# Patient Record
Sex: FEMALE | Race: WHITE | NOT HISPANIC OR LATINO | Employment: OTHER | URBAN - NONMETROPOLITAN AREA
[De-identification: names, ages, dates, MRNs, and addresses within clinical notes are randomized per-mention and may not be internally consistent; named-entity substitution may affect disease eponyms.]

---

## 2017-01-11 RX ORDER — ERGOCALCIFEROL 1.25 MG/1
CAPSULE ORAL
Qty: 12 CAPSULE | Refills: 0 | Status: SHIPPED | OUTPATIENT
Start: 2017-01-11 | End: 2017-04-06 | Stop reason: SDUPTHER

## 2017-01-26 RX ORDER — METFORMIN HYDROCHLORIDE 500 MG/1
TABLET, EXTENDED RELEASE ORAL
Qty: 360 TABLET | Refills: 0 | Status: SHIPPED | OUTPATIENT
Start: 2017-01-26 | End: 2017-01-31

## 2017-01-27 ENCOUNTER — APPOINTMENT (OUTPATIENT)
Dept: LAB | Facility: HOSPITAL | Age: 62
End: 2017-01-27

## 2017-01-27 PROCEDURE — 36415 COLL VENOUS BLD VENIPUNCTURE: CPT | Performed by: FAMILY MEDICINE

## 2017-01-27 PROCEDURE — 84443 ASSAY THYROID STIM HORMONE: CPT | Performed by: FAMILY MEDICINE

## 2017-01-27 PROCEDURE — 82607 VITAMIN B-12: CPT | Performed by: FAMILY MEDICINE

## 2017-01-27 PROCEDURE — 85027 COMPLETE CBC AUTOMATED: CPT | Performed by: FAMILY MEDICINE

## 2017-01-27 PROCEDURE — 84439 ASSAY OF FREE THYROXINE: CPT | Performed by: FAMILY MEDICINE

## 2017-01-27 PROCEDURE — 80053 COMPREHEN METABOLIC PANEL: CPT | Performed by: FAMILY MEDICINE

## 2017-01-27 PROCEDURE — 82306 VITAMIN D 25 HYDROXY: CPT | Performed by: FAMILY MEDICINE

## 2017-01-27 PROCEDURE — 80061 LIPID PANEL: CPT | Performed by: FAMILY MEDICINE

## 2017-01-27 PROCEDURE — 83036 HEMOGLOBIN GLYCOSYLATED A1C: CPT | Performed by: FAMILY MEDICINE

## 2017-01-31 ENCOUNTER — OFFICE VISIT (OUTPATIENT)
Dept: ENDOCRINOLOGY | Facility: CLINIC | Age: 62
End: 2017-01-31

## 2017-01-31 VITALS
DIASTOLIC BLOOD PRESSURE: 78 MMHG | SYSTOLIC BLOOD PRESSURE: 124 MMHG | BODY MASS INDEX: 28.54 KG/M2 | WEIGHT: 161.1 LBS | HEIGHT: 63 IN | HEART RATE: 61 BPM

## 2017-01-31 DIAGNOSIS — E11.69 MIXED DIABETIC HYPERLIPIDEMIA ASSOCIATED WITH TYPE 2 DIABETES MELLITUS (HCC): ICD-10-CM

## 2017-01-31 DIAGNOSIS — G57.12 MERALGIA PARAESTHETICA, LEFT: ICD-10-CM

## 2017-01-31 DIAGNOSIS — E11.59 HYPERTENSION ASSOCIATED WITH DIABETES (HCC): ICD-10-CM

## 2017-01-31 DIAGNOSIS — I15.2 HYPERTENSION ASSOCIATED WITH DIABETES (HCC): ICD-10-CM

## 2017-01-31 DIAGNOSIS — I43 TYPE 2 DIABETES MELLITUS WITH DIABETIC CARDIOMYOPATHY (HCC): Primary | ICD-10-CM

## 2017-01-31 DIAGNOSIS — E53.8 B12 DEFICIENCY: ICD-10-CM

## 2017-01-31 DIAGNOSIS — E78.2 MIXED DIABETIC HYPERLIPIDEMIA ASSOCIATED WITH TYPE 2 DIABETES MELLITUS (HCC): ICD-10-CM

## 2017-01-31 DIAGNOSIS — E11.69 TYPE 2 DIABETES MELLITUS WITH DIABETIC CARDIOMYOPATHY (HCC): Primary | ICD-10-CM

## 2017-01-31 DIAGNOSIS — I25.10 CORONARY ARTERY DISEASE INVOLVING NATIVE CORONARY ARTERY OF NATIVE HEART, ANGINA PRESENCE UNSPECIFIED: ICD-10-CM

## 2017-01-31 DIAGNOSIS — E55.9 VITAMIN D DEFICIENCY: ICD-10-CM

## 2017-01-31 PROBLEM — G57.10 MERALGIA PARAESTHETICA: Status: ACTIVE | Noted: 2017-01-31

## 2017-01-31 LAB — GLUCOSE BLDC GLUCOMTR-MCNC: 225 MG/DL (ref 70–130)

## 2017-01-31 PROCEDURE — 99214 OFFICE O/P EST MOD 30 MIN: CPT | Performed by: INTERNAL MEDICINE

## 2017-01-31 PROCEDURE — 82962 GLUCOSE BLOOD TEST: CPT | Performed by: INTERNAL MEDICINE

## 2017-01-31 RX ORDER — EZETIMIBE 10 MG/1
10 TABLET ORAL DAILY
Qty: 30 TABLET | Refills: 11 | Status: SHIPPED | OUTPATIENT
Start: 2017-01-31 | End: 2017-12-11 | Stop reason: SDUPTHER

## 2017-01-31 RX ORDER — PRASUGREL 10 MG/1
10 TABLET, FILM COATED ORAL DAILY
COMMUNITY
End: 2020-05-14 | Stop reason: SDUPTHER

## 2017-01-31 RX ORDER — GABAPENTIN 300 MG/1
300 CAPSULE ORAL 3 TIMES DAILY
Qty: 90 CAPSULE | Refills: 11 | Status: SHIPPED | OUTPATIENT
Start: 2017-01-31 | End: 2017-02-20

## 2017-01-31 NOTE — PROGRESS NOTES
Bonnie Garcia is a 61 y.o. female who presents for  evaluation of No chief complaint on file.        Primary Care / Referring Provider  Yudi Quiñones MD      Duration/Timing:  Diabetes mellitus type 2, Age at onset of diabetes: 58 years, Onset of symptoms gradual  Timing - constant  Quality - controlled  Severity - high since associated CAD    alleviating factors - degree of understanding/education    Severity (Complications/Hospitalizations)  Secondary Macrovascular Complications:  CAD, No CVA, No PAD  has 50 to 50% LAD     PTCI , stent x 5    2 in proximal LAD    1 in circumflex    may need heart surgery   Secondary Microvascular Complications:  No Diabetic Nephropathy, No Diabetic Retinopathy, No Diabetic Neuropathy    Context  Diabetes Regimen:  Oral Medications  Blood Glucose Readings  at goal  Diet  variable carb intake  Exercise:  Does not exercise    Associated Signs/Symptoms  Hyperglycemic Symptoms:  No polyuria, No polydipsia, No polyphagia, No blurred vision, Weight gain, Weight loss/gain amount 12 lbs lbs since last appointment  Hypoglycemic Episodes:  No documented hypoglycemia  Also h o thyroid nodule  location - right  timing constant  not progressive  severity - low -  bx , Sept 2008 - benign   biochemically euthyroid , off synthroid but used to be on 25 mcgs daily     thyroid US personally reviewed    Sept 2008    1.8 x 1.7 x 1.4 , right , solid , heterogeneous, hypoechoic.     Repeat US June 2013    r solid nodule stable in size, echogenicity consistent w Hashimoto's       Past Medical History   Diagnosis Date   • Chicken pox    • Coronary arteriosclerosis    • Diabetes mellitus      TYPE 2   • Diastasis of muscle    • GERD (gastroesophageal reflux disease)    • H/O Hashimoto thyroiditis    • History of echocardiogram 01/01/2007     ef 35-40% diastolic dysfunction   • History of mammogram 04/13/2015   • History of myocardial infarction 2006   • History of Papanicolaou smear of cervix  2004     negative   • Hyperlipidemia    • Hypertension    • Measles    • Mild concentric left ventricular hypertrophy (LVH)    • Thyroid nodule    • Vitamin D deficiency      Family History   Problem Relation Age of Onset   • Diabetes Mother    • Coronary artery disease Mother    • Heart attack Father       at 53 multiple mi's   • Coronary artery disease Other    • Diabetes Other    • Thyroid disease Other    • Hypothyroidism Daughter      Social History   Substance Use Topics   • Smoking status: Former Smoker     Types: Cigarettes   • Smokeless tobacco: Never Used      Comment: smoked for 5 years   • Alcohol use Yes      Comment: SOCIAL         Current Outpatient Prescriptions:   •  aspirin 325 MG tablet, Take 325 mg by mouth Daily., Disp: , Rfl:   •  Canagliflozin 100 MG tablet, Take 1 dose by mouth Daily., Disp: 30 tablet, Rfl: 2  •  carvedilol CR (COREG CR) 20 MG 24 hr capsule, Take 20 mg by mouth Daily., Disp: , Rfl:   •  glucose blood test strip, 1 each by Other route As Needed. Use as instructed, Disp: , Rfl:   •  icosapent ethyl (VASCEPA) 1 G capsule capsule, Take 2 g by mouth 2 (Two) Times a Day With Meals., Disp: , Rfl:   •  metFORMIN ER (GLUCOPHAGE-XR) 500 MG 24 hr tablet, TAKE 2 TABLETS BY MOUTH TWICE DAILY, Disp: 360 tablet, Rfl: 0  •  prasugrel (EFFIENT) 10 MG tablet, Take 10 mg by mouth Daily., Disp: , Rfl:   •  ranolazine (RANEXA) 500 MG 12 hr tablet, Take 500 mg by mouth 2 (Two) Times a Day., Disp: , Rfl:   •  rosuvastatin (CRESTOR) 5 MG tablet, Take 5 mg by mouth Daily., Disp: , Rfl:   •  vitamin D (ERGOCALCIFEROL) 00493 UNITS capsule capsule, TAKE 1 CAPSULE BY MOUTH ONCE A WEEK, Disp: 12 capsule, Rfl: 0    Review of Systems    Review of Systems   Constitutional: Negative for activity change, appetite change, chills, diaphoresis, fatigue, fever and unexpected weight change.   HENT: Negative for congestion, dental problem, drooling, ear discharge, ear pain, facial swelling, mouth sores,  "postnasal drip, rhinorrhea, sinus pressure, sore throat, tinnitus, trouble swallowing and voice change.    Eyes: Negative for photophobia, pain, discharge, redness, itching and visual disturbance.   Respiratory: Negative for apnea, cough, choking, chest tightness, shortness of breath, wheezing and stridor.    Cardiovascular: Negative for chest pain, palpitations and leg swelling.   Gastrointestinal: Negative for abdominal distention, abdominal pain, constipation, diarrhea, nausea and vomiting.   Endocrine: Negative for cold intolerance, heat intolerance, polydipsia, polyphagia and polyuria.   Genitourinary: Negative for decreased urine volume, difficulty urinating, dysuria, flank pain, frequency, hematuria and urgency.   Musculoskeletal: Positive for myalgias. Negative for arthralgias, back pain, gait problem, joint swelling, neck pain and neck stiffness.   Skin: Negative for color change, pallor, rash and wound.   Allergic/Immunologic: Negative for immunocompromised state.   Neurological: Negative for dizziness, tremors, seizures, syncope, facial asymmetry, speech difficulty, weakness, light-headedness, numbness and headaches.   Hematological: Negative for adenopathy.   Psychiatric/Behavioral: Negative for agitation, behavioral problems, confusion, decreased concentration, dysphoric mood, hallucinations, self-injury, sleep disturbance and suicidal ideas. The patient is not nervous/anxious and is not hyperactive.         Objective:     Visit Vitals   • /78 (BP Location: Left arm, Patient Position: Sitting, Cuff Size: Large Adult)   • Pulse 61   • Ht 63\" (160 cm)   • Wt 161 lb 1.6 oz (73.1 kg)   • BMI 28.54 kg/m2       Physical Exam   Constitutional: She is oriented to person, place, and time. She appears well-developed.   HENT:   Head: Normocephalic.   Right Ear: External ear normal.   Left Ear: External ear normal.   Nose: Nose normal.   Eyes: Conjunctivae and EOM are normal. No scleral icterus.   Neck: " Normal range of motion. Neck supple. No tracheal deviation present. No thyromegaly present.   Cardiovascular: Normal rate, regular rhythm, normal heart sounds and intact distal pulses.  Exam reveals no gallop and no friction rub.    No murmur heard.  Pulmonary/Chest: Effort normal and breath sounds normal. No stridor. No respiratory distress. She has no wheezes. She has no rales. She exhibits no tenderness.   Abdominal: Soft. Bowel sounds are normal. She exhibits no distension and no mass. There is no tenderness. There is no rebound and no guarding.   Musculoskeletal: Normal range of motion. She exhibits no tenderness or deformity.   Lymphadenopathy:     She has no cervical adenopathy.   Neurological: She is alert and oriented to person, place, and time. She displays normal reflexes. She exhibits normal muscle tone. Coordination normal.   Skin: No rash noted. No erythema. No pallor.   Psychiatric: She has a normal mood and affect. Her behavior is normal. Judgment and thought content normal.       Lab Review    Results for orders placed or performed in visit on 01/31/17   POC Glucose Fingerstick   Result Value Ref Range    Glucose 225 (A) 70 - 130 mg/dL           Assessment/Plan       ICD-10-CM ICD-9-CM   1. Type 2 diabetes mellitus with diabetic nephropathy, without long-term current use of insulin E11.21 250.40     583.81         Glycemic Management:      Lab Results   Component Value Date    HGBA1C 7.88 (H) 01/27/2017    HGBA1C 7.0 (H) 06/27/2016    HGBA1C 5.8 (H) 01/04/2016     Lab Results   Component Value Date    LDLCALC 102 06/27/2016    CREATININE 0.49 (L) 01/27/2017         Diabetes ----        on metformin  mg tablets, 1 gram po bid    invokana 100 mg daily     Change to synjardy 12.5/1000 , 1 tab po bid since has CAD and based on EMPAREG data        never needed glimepiride    afraid of januvia , victoza     off glyburide    concern about pancreatic cancer      Lipid Management  Lab Results    Component Value Date    CHOL 204 (H) 01/27/2017     Lab Results   Component Value Date    TRIG 281 (H) 01/27/2017    TRIG 288 (H) 06/27/2016    TRIG 114 10/16/2015     Lab Results   Component Value Date    HDL 43 (L) 01/27/2017    HDL 36 (L) 06/27/2016    HDL 45 (L) 10/16/2015     Lab Results   Component Value Date    LDLCALC 102 06/27/2016    LDLCALC 108 10/16/2015    LDLCALC 60 03/30/2015     LDL elevated        can't handle tricor    on vascepa ( intolerant to lovaza )    niaspan 500 mg ER  taking - continue    tolerating crestor 5 mg but not ever day     has tried lipitor, zocor, livalo, fluvastatin since 2010 but hasn't been able to tolerate     start repatha or praluent not paid for    Try zetia 10 mg daily         Blood Pressure Management:  Target blood pressure less than 130/80 mm/hg, Not on Ace Inhibitor/ARB, Control of blood pressure  on coreg cr 20 mg daily and  losartan 25 mg   was on benicar ,  off ACEi due to cough    edema left > r , use compression stockings  Microvascular Complication Monitoring:  No Microalbuminuria, Date of last Microalbumin Assessment 10/08/2015, No Diabetic Retinopathy, No Diabetic Neuropathy  last eye exam 2015 no DR    +  neuropathy   Meralgia paresthetica  Start neurontin 300 mg tid   no nephropathy    mild microalbuminuria  Immunizations:  Last influenza immunization 2015, Patient prefers not to receive pneumococcal immunization  Preventive Care:  Patient is not smoking  Weight Related  has gained 12 lbs but will lose it   Bone Health  Vit D 50 th weekly and levels are nl   Other Diabetes Related Aspects  Right Thyroid nodule      R nodule , dimensions in HPI , stable from 2008 to 2013     she has Hashimoto's proven on US and + TPO ab, euthyroid June 2013  used to be on Lt4 at 25 mcgs but d/c years ago  follow TSH - nl     Lab Results   Component Value Date    TSH 2.020 01/27/2017         --    10-15  nl B12 but low normal, start b12     having some unsteadiness and  numbness      I reviewed and summarized records from Yudi Quiñones MD from 2017 and I reviewed / ordered labs.     Orders Placed This Encounter   Procedures   • POC Glucose Fingerstick         A copy of my note was sent to Yudi Quiñones MD    Please see my above opinion and suggestions.

## 2017-01-31 NOTE — MR AVS SNAPSHOT
Bonnie Garcia   1/31/2017 11:30 AM   Office Visit    Dept Phone:  [phone]   Encounter #:  [id number]    Provider:  Victorino Leyva MD   Department:  [facility] ENDOCRINOLOGY                Your Full Care Plan              Today's Medication Changes          These changes are accurate as of: 1/31/17 12:32 PM.  If you have any questions, ask your nurse or doctor.               New Medication(s)Ordered:     Empagliflozin-Metformin HCl 12.5-1000 MG tablet   Commonly known as:  SYNJARDY   Take 1 tablet by mouth 2 (Two) Times a Day With Meals.       ezetimibe 10 MG tablet   Commonly known as:  ZETIA   Take 1 tablet by mouth Daily.       gabapentin 300 MG capsule   Commonly known as:  NEURONTIN   Take 1 capsule by mouth 3 (Three) Times a Day.         Stop taking medication(s)listed here:     Canagliflozin 100 MG tablet           metFORMIN  MG 24 hr tablet   Commonly known as:  GLUCOPHAGE-XR                Where to Get Your Medications      These medications were sent to [facility] [address] [place] [address] AT [facility] [address] [place] - [phone] [facility] [phone] [address] [address]     Phone:  [phone]     Empagliflozin-Metformin HCl 12.5-1000 MG tablet    ezetimibe 10 MG tablet    gabapentin 300 MG capsule                  Your Updated Medication List          This list is accurate as of: 1/31/17 12:32 PM.  Always use your most recent med list.                aspirin 325 MG tablet       carvedilol CR 20 MG 24 hr capsule   Commonly known as:  COREG CR       Empagliflozin-Metformin HCl 12.5-1000 MG tablet   Commonly known as:  SYNJARDY   Take 1 tablet by mouth 2 (Two) Times a Day With Meals.       ezetimibe 10 MG tablet   Commonly known as:  ZETIA   Take 1 tablet by mouth Daily.       gabapentin 300 MG capsule   Commonly known as:  NEURONTIN   Take 1 capsule by mouth 3 (Three) Times a Day.       glucose  blood test strip       prasugrel 10 MG tablet   Commonly known as:  EFFIENT       ranolazine 500 MG 12 hr tablet   Commonly known as:  RANEXA       rosuvastatin 5 MG tablet   Commonly known as:  CRESTOR       VASCEPA 1 G capsule capsule   Generic drug:  icosapent ethyl       vitamin D 80252 UNITS capsule capsule   Commonly known as:  ERGOCALCIFEROL   TAKE 1 CAPSULE BY MOUTH ONCE A WEEK               We Performed the Following     POC Glucose Fingerstick       You Were Diagnosed With        Codes Comments    Type 2 diabetes mellitus with diabetic cardiomyopathy    -  Primary ICD-10-CM: E11.69, I43  ICD-9-CM: 250.80, 425.7     Coronary artery disease involving native coronary artery of native heart, angina presence unspecified     ICD-10-CM: I25.10  ICD-9-CM: 414.01     Hypertension associated with diabetes     ICD-10-CM: E11.59, I10  ICD-9-CM: 250.80, 401.9     Mixed diabetic hyperlipidemia associated with type 2 diabetes mellitus     ICD-10-CM: E11.69, E78.2  ICD-9-CM: 250.80, 272.2     Vitamin D deficiency     ICD-10-CM: E55.9  ICD-9-CM: 268.9     B12 deficiency     ICD-10-CM: E53.8  ICD-9-CM: 266.2     Meralgia paraesthetica, left     ICD-10-CM: G57.12  ICD-9-CM: 355.1       Instructions     None    Patient Instructions History      Upcoming Appointments     Visit Type Date Time Department    FOLLOW UP 1/31/2017 11:30 AM [facility]CR[facility]OLOGY [facility]    FOLLOW UP 6/5/2017 10:45 AM [facility]CR[facility]OLOGY [facility]      MyChart Signup     Our records indicate that you have declined [facility] signup. If you would like to sign up for [facility], please email [email or web address] or call [phone] to obtain an activation code.             Other Info from Your Visit           Your Appointments     Jun 05, 2017 10:45 AM CDT   Follow Up with Victorino Leyva MD   [facility] (--)    [address]  [facility] [address] [place] [id number]   [phone]           Arrive 15  "minutes prior to appointment.              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

## 2017-02-20 RX ORDER — GABAPENTIN 100 MG/1
CAPSULE ORAL
Qty: 180 CAPSULE | Refills: 11 | Status: SHIPPED | OUTPATIENT
Start: 2017-02-20 | End: 2018-01-26 | Stop reason: SINTOL

## 2017-03-08 RX ORDER — ROSUVASTATIN CALCIUM 5 MG/1
5 TABLET, COATED ORAL DAILY
Qty: 30 TABLET | Refills: 5 | Status: SHIPPED | OUTPATIENT
Start: 2017-03-08 | End: 2017-07-27 | Stop reason: SDUPTHER

## 2017-04-06 RX ORDER — ERGOCALCIFEROL 1.25 MG/1
CAPSULE ORAL
Qty: 12 CAPSULE | Refills: 0 | Status: SHIPPED | OUTPATIENT
Start: 2017-04-06 | End: 2017-06-16 | Stop reason: SDUPTHER

## 2017-05-31 ENCOUNTER — LAB (OUTPATIENT)
Dept: LAB | Facility: HOSPITAL | Age: 62
End: 2017-05-31

## 2017-05-31 DIAGNOSIS — I25.10 CORONARY ARTERY DISEASE INVOLVING NATIVE CORONARY ARTERY OF NATIVE HEART, ANGINA PRESENCE UNSPECIFIED: ICD-10-CM

## 2017-05-31 DIAGNOSIS — I43 TYPE 2 DIABETES MELLITUS WITH DIABETIC CARDIOMYOPATHY (HCC): ICD-10-CM

## 2017-05-31 DIAGNOSIS — E11.69 TYPE 2 DIABETES MELLITUS WITH DIABETIC CARDIOMYOPATHY (HCC): ICD-10-CM

## 2017-05-31 DIAGNOSIS — E53.8 B12 DEFICIENCY: ICD-10-CM

## 2017-05-31 DIAGNOSIS — E78.2 MIXED DIABETIC HYPERLIPIDEMIA ASSOCIATED WITH TYPE 2 DIABETES MELLITUS (HCC): ICD-10-CM

## 2017-05-31 DIAGNOSIS — E11.59 HYPERTENSION ASSOCIATED WITH DIABETES (HCC): ICD-10-CM

## 2017-05-31 DIAGNOSIS — I15.2 HYPERTENSION ASSOCIATED WITH DIABETES (HCC): ICD-10-CM

## 2017-05-31 DIAGNOSIS — E55.9 VITAMIN D DEFICIENCY: ICD-10-CM

## 2017-05-31 DIAGNOSIS — E11.69 MIXED DIABETIC HYPERLIPIDEMIA ASSOCIATED WITH TYPE 2 DIABETES MELLITUS (HCC): ICD-10-CM

## 2017-05-31 LAB
25(OH)D3 SERPL-MCNC: 50 NG/ML (ref 30–100)
ALBUMIN SERPL-MCNC: 4.3 G/DL (ref 3.4–4.8)
ALBUMIN UR-MCNC: 2.4 MG/L
ALBUMIN/GLOB SERPL: 1.7 G/DL (ref 1.1–1.8)
ALP SERPL-CCNC: 91 U/L (ref 38–126)
ALT SERPL W P-5'-P-CCNC: 46 U/L (ref 9–52)
ANION GAP SERPL CALCULATED.3IONS-SCNC: 13 MMOL/L (ref 5–15)
ARTICHOKE IGE QN: 66 MG/DL (ref 1–129)
AST SERPL-CCNC: 85 U/L (ref 14–36)
BASOPHILS # BLD AUTO: 0.02 10*3/MM3 (ref 0–0.2)
BASOPHILS NFR BLD AUTO: 0.4 % (ref 0–2)
BILIRUB SERPL-MCNC: 0.9 MG/DL (ref 0.2–1.3)
BUN BLD-MCNC: 14 MG/DL (ref 7–21)
BUN/CREAT SERPL: 27.5 (ref 7–25)
CALCIUM SPEC-SCNC: 9 MG/DL (ref 8.4–10.2)
CHLORIDE SERPL-SCNC: 101 MMOL/L (ref 95–110)
CHOLEST SERPL-MCNC: 135 MG/DL (ref 0–199)
CO2 SERPL-SCNC: 27 MMOL/L (ref 22–31)
CREAT BLD-MCNC: 0.51 MG/DL (ref 0.5–1)
CREAT UR-MCNC: 45.6 MG/DL
DEPRECATED RDW RBC AUTO: 45 FL (ref 36.4–46.3)
EOSINOPHIL # BLD AUTO: 0.17 10*3/MM3 (ref 0–0.7)
EOSINOPHIL NFR BLD AUTO: 3.2 % (ref 0–7)
ERYTHROCYTE [DISTWIDTH] IN BLOOD BY AUTOMATED COUNT: 13.8 % (ref 11.5–14.5)
GFR SERPL CREATININE-BSD FRML MDRD: 123 ML/MIN/1.73 (ref 45–104)
GLOBULIN UR ELPH-MCNC: 2.6 GM/DL (ref 2.3–3.5)
GLUCOSE BLD-MCNC: 141 MG/DL (ref 60–100)
HBA1C MFR BLD: 7.16 % (ref 4–5.6)
HCT VFR BLD AUTO: 41.9 % (ref 35–45)
HDLC SERPL-MCNC: 41 MG/DL (ref 60–200)
HGB BLD-MCNC: 14 G/DL (ref 12–15.5)
IMM GRANULOCYTES # BLD: 0.1 10*3/MM3 (ref 0–0.02)
IMM GRANULOCYTES NFR BLD: 1.9 % (ref 0–0.5)
LDLC/HDLC SERPL: 1.15 {RATIO} (ref 0–3.22)
LYMPHOCYTES # BLD AUTO: 1.51 10*3/MM3 (ref 0.6–4.2)
LYMPHOCYTES NFR BLD AUTO: 28 % (ref 10–50)
MAGNESIUM SERPL-MCNC: 2 MG/DL (ref 1.6–2.3)
MCH RBC QN AUTO: 29.9 PG (ref 26.5–34)
MCHC RBC AUTO-ENTMCNC: 33.4 G/DL (ref 31.4–36)
MCV RBC AUTO: 89.3 FL (ref 80–98)
MICROALBUMIN/CREAT UR: 52.6 MG/G (ref 0–30)
MONOCYTES # BLD AUTO: 0.42 10*3/MM3 (ref 0–0.9)
MONOCYTES NFR BLD AUTO: 7.8 % (ref 0–12)
NEUTROPHILS # BLD AUTO: 3.17 10*3/MM3 (ref 2–8.6)
NEUTROPHILS NFR BLD AUTO: 58.7 % (ref 37–80)
PLATELET # BLD AUTO: 229 10*3/MM3 (ref 150–450)
PMV BLD AUTO: 11.3 FL (ref 8–12)
POTASSIUM BLD-SCNC: 4.5 MMOL/L (ref 3.5–5.1)
PROT SERPL-MCNC: 6.9 G/DL (ref 6.3–8.6)
RBC # BLD AUTO: 4.69 10*6/MM3 (ref 3.77–5.16)
SODIUM BLD-SCNC: 141 MMOL/L (ref 137–145)
TRIGL SERPL-MCNC: 235 MG/DL (ref 20–199)
TSH SERPL DL<=0.05 MIU/L-ACNC: 1.54 MIU/ML (ref 0.46–4.68)
VIT B12 BLD-MCNC: 332 PG/ML (ref 239–931)
WBC NRBC COR # BLD: 5.39 10*3/MM3 (ref 3.2–9.8)

## 2017-05-31 PROCEDURE — 82306 VITAMIN D 25 HYDROXY: CPT | Performed by: INTERNAL MEDICINE

## 2017-05-31 PROCEDURE — 36415 COLL VENOUS BLD VENIPUNCTURE: CPT

## 2017-05-31 PROCEDURE — 83036 HEMOGLOBIN GLYCOSYLATED A1C: CPT | Performed by: INTERNAL MEDICINE

## 2017-05-31 PROCEDURE — 83735 ASSAY OF MAGNESIUM: CPT | Performed by: INTERNAL MEDICINE

## 2017-05-31 PROCEDURE — 82607 VITAMIN B-12: CPT | Performed by: INTERNAL MEDICINE

## 2017-05-31 PROCEDURE — 80053 COMPREHEN METABOLIC PANEL: CPT | Performed by: INTERNAL MEDICINE

## 2017-05-31 PROCEDURE — 85025 COMPLETE CBC W/AUTO DIFF WBC: CPT | Performed by: INTERNAL MEDICINE

## 2017-05-31 PROCEDURE — 82570 ASSAY OF URINE CREATININE: CPT | Performed by: INTERNAL MEDICINE

## 2017-05-31 PROCEDURE — 80061 LIPID PANEL: CPT | Performed by: INTERNAL MEDICINE

## 2017-05-31 PROCEDURE — 84443 ASSAY THYROID STIM HORMONE: CPT | Performed by: INTERNAL MEDICINE

## 2017-05-31 PROCEDURE — 82043 UR ALBUMIN QUANTITATIVE: CPT | Performed by: INTERNAL MEDICINE

## 2017-06-05 ENCOUNTER — OFFICE VISIT (OUTPATIENT)
Dept: ENDOCRINOLOGY | Facility: CLINIC | Age: 62
End: 2017-06-05

## 2017-06-05 VITALS
WEIGHT: 161.4 LBS | HEIGHT: 63 IN | SYSTOLIC BLOOD PRESSURE: 126 MMHG | DIASTOLIC BLOOD PRESSURE: 78 MMHG | HEART RATE: 100 BPM | BODY MASS INDEX: 28.6 KG/M2

## 2017-06-05 DIAGNOSIS — I15.2 HYPERTENSION ASSOCIATED WITH DIABETES (HCC): ICD-10-CM

## 2017-06-05 DIAGNOSIS — E11.69 TYPE 2 DIABETES MELLITUS WITH DIABETIC CARDIOMYOPATHY (HCC): ICD-10-CM

## 2017-06-05 DIAGNOSIS — E55.9 VITAMIN D DEFICIENCY: ICD-10-CM

## 2017-06-05 DIAGNOSIS — E11.59 HYPERTENSION ASSOCIATED WITH DIABETES (HCC): ICD-10-CM

## 2017-06-05 DIAGNOSIS — E11.69 MIXED DIABETIC HYPERLIPIDEMIA ASSOCIATED WITH TYPE 2 DIABETES MELLITUS (HCC): Primary | ICD-10-CM

## 2017-06-05 DIAGNOSIS — E53.8 B12 DEFICIENCY: ICD-10-CM

## 2017-06-05 DIAGNOSIS — I43 TYPE 2 DIABETES MELLITUS WITH DIABETIC CARDIOMYOPATHY (HCC): ICD-10-CM

## 2017-06-05 DIAGNOSIS — E78.2 MIXED DIABETIC HYPERLIPIDEMIA ASSOCIATED WITH TYPE 2 DIABETES MELLITUS (HCC): Primary | ICD-10-CM

## 2017-06-05 LAB — GLUCOSE BLDC GLUCOMTR-MCNC: 183 MG/DL (ref 70–130)

## 2017-06-05 PROCEDURE — 82962 GLUCOSE BLOOD TEST: CPT | Performed by: INTERNAL MEDICINE

## 2017-06-05 PROCEDURE — 99214 OFFICE O/P EST MOD 30 MIN: CPT | Performed by: INTERNAL MEDICINE

## 2017-06-05 NOTE — PROGRESS NOTES
Bonnie Garcia is a 62 y.o. female who presents for  evaluation of   Chief Complaint   Patient presents with   • Diabetes         Primary Care / Referring Provider  Yudi Quiñones MD      Duration/Timing:  Diabetes mellitus type 2, Age at onset of diabetes: 58 years, Onset of symptoms gradual  Timing - constant  Quality - controlled  Severity - high since associated CAD    alleviating factors - degree of understanding/education    Severity (Complications/Hospitalizations)  Secondary Macrovascular Complications:  CAD, No CVA, No PAD  has 50 to 50% LAD     PTCI , stent x 5    2 in proximal LAD    1 in circumflex    may need heart surgery   Secondary Microvascular Complications:  No Diabetic Nephropathy, No Diabetic Retinopathy, No Diabetic Neuropathy    Context  Diabetes Regimen:  Oral Medications  Blood Glucose Readings  at goal  Diet  variable carb intake  Exercise:  Does not exercise    Associated Signs/Symptoms  Hyperglycemic Symptoms:  No polyuria, No polydipsia, No polyphagia, No blurred vision, Weight gain, Weight loss/gain amount 12 lbs lbs since last appointment  Hypoglycemic Episodes:  No documented hypoglycemia  Also h o thyroid nodule  location - right  timing constant  not progressive  severity - low -  bx , Sept 2008 - benign   biochemically euthyroid , off synthroid but used to be on 25 mcgs daily     thyroid US personally reviewed    Sept 2008    1.8 x 1.7 x 1.4 , right , solid , heterogeneous, hypoechoic.     Repeat US June 2013    r solid nodule stable in size, echogenicity consistent w Hashimoto's       Past Medical History:   Diagnosis Date   • Chicken pox    • Coronary arteriosclerosis    • Diabetes mellitus     TYPE 2   • Diastasis of muscle    • GERD (gastroesophageal reflux disease)    • H/O Hashimoto thyroiditis    • History of echocardiogram 01/01/2007    ef 35-40% diastolic dysfunction   • History of mammogram 04/13/2015   • History of myocardial infarction 2006   • History of  Papanicolaou smear of cervix 2004    negative   • Hyperlipidemia    • Hypertension    • Measles    • Mild concentric left ventricular hypertrophy (LVH)    • Thyroid nodule    • Vitamin D deficiency      Family History   Problem Relation Age of Onset   • Diabetes Mother    • Coronary artery disease Mother    • Heart attack Father       at 53 multiple mi's   • Coronary artery disease Other    • Diabetes Other    • Thyroid disease Other    • Hypothyroidism Daughter      Social History   Substance Use Topics   • Smoking status: Former Smoker     Types: Cigarettes   • Smokeless tobacco: Never Used      Comment: smoked for 5 years   • Alcohol use Yes      Comment: SOCIAL         Current Outpatient Prescriptions:   •  aspirin 325 MG tablet, Take 325 mg by mouth Daily., Disp: , Rfl:   •  carvedilol CR (COREG CR) 20 MG 24 hr capsule, Take 20 mg by mouth Daily., Disp: , Rfl:   •  Empagliflozin-Metformin HCl (SYNJARDY) 12.5-1000 MG tablet, Take 1 tablet by mouth 2 (Two) Times a Day With Meals., Disp: 60 tablet, Rfl: 11  •  ezetimibe (ZETIA) 10 MG tablet, Take 1 tablet by mouth Daily., Disp: 30 tablet, Rfl: 11  •  gabapentin (NEURONTIN) 100 MG capsule, Take up to 200 mg tid, Disp: 180 capsule, Rfl: 11  •  glucose blood test strip, 1 each by Other route As Needed. Use as instructed, Disp: , Rfl:   •  icosapent ethyl (VASCEPA) 1 G capsule capsule, Take 2 g by mouth 2 (Two) Times a Day With Meals., Disp: , Rfl:   •  prasugrel (EFFIENT) 10 MG tablet, Take 10 mg by mouth Daily., Disp: , Rfl:   •  ranolazine (RANEXA) 500 MG 12 hr tablet, Take 500 mg by mouth 2 (Two) Times a Day., Disp: , Rfl:   •  rosuvastatin (CRESTOR) 5 MG tablet, Take 1 tablet by mouth Daily., Disp: 30 tablet, Rfl: 5  •  vitamin D (ERGOCALCIFEROL) 74970 UNITS capsule capsule, TAKE 1 CAPSULE BY MOUTH ONCE A WEEK, Disp: 12 capsule, Rfl: 0    Review of Systems    Review of Systems   Constitutional: Negative for activity change, appetite change, chills,  "diaphoresis, fatigue, fever and unexpected weight change.   HENT: Negative for congestion, dental problem, drooling, ear discharge, ear pain, facial swelling, mouth sores, postnasal drip, rhinorrhea, sinus pressure, sore throat, tinnitus, trouble swallowing and voice change.    Eyes: Negative for photophobia, pain, discharge, redness, itching and visual disturbance.   Respiratory: Negative for apnea, cough, choking, chest tightness, shortness of breath, wheezing and stridor.    Cardiovascular: Negative for chest pain, palpitations and leg swelling.   Gastrointestinal: Negative for abdominal distention, abdominal pain, constipation, diarrhea, nausea and vomiting.   Endocrine: Negative for cold intolerance, heat intolerance, polydipsia, polyphagia and polyuria.   Genitourinary: Negative for decreased urine volume, difficulty urinating, dysuria, flank pain, frequency, hematuria and urgency.   Musculoskeletal: Positive for myalgias. Negative for arthralgias, back pain, gait problem, joint swelling, neck pain and neck stiffness.   Skin: Negative for color change, pallor, rash and wound.   Allergic/Immunologic: Negative for immunocompromised state.   Neurological: Negative for dizziness, tremors, seizures, syncope, facial asymmetry, speech difficulty, weakness, light-headedness, numbness and headaches.   Hematological: Negative for adenopathy.   Psychiatric/Behavioral: Negative for agitation, behavioral problems, confusion, decreased concentration, dysphoric mood, hallucinations, self-injury, sleep disturbance and suicidal ideas. The patient is not nervous/anxious and is not hyperactive.         Objective:     /78 (BP Location: Right arm, Patient Position: Sitting, Cuff Size: Adult)  Pulse 100  Ht 63\" (160 cm)  Wt 161 lb 6.4 oz (73.2 kg)  BMI 28.59 kg/m2    Physical Exam   Constitutional: She is oriented to person, place, and time. She appears well-developed.   HENT:   Head: Normocephalic.   Right Ear: External " ear normal.   Left Ear: External ear normal.   Nose: Nose normal.   Eyes: Conjunctivae and EOM are normal. No scleral icterus.   Neck: Normal range of motion. Neck supple. No tracheal deviation present. No thyromegaly present.   Cardiovascular: Normal rate, regular rhythm, normal heart sounds and intact distal pulses.  Exam reveals no gallop and no friction rub.    No murmur heard.  Pulmonary/Chest: Effort normal and breath sounds normal. No stridor. No respiratory distress. She has no wheezes. She has no rales. She exhibits no tenderness.   Abdominal: Soft. Bowel sounds are normal. She exhibits no distension and no mass. There is no tenderness. There is no rebound and no guarding.   Musculoskeletal: Normal range of motion. She exhibits no tenderness or deformity.   Lymphadenopathy:     She has no cervical adenopathy.   Neurological: She is alert and oriented to person, place, and time. She displays normal reflexes. She exhibits normal muscle tone. Coordination normal.   Skin: No rash noted. No erythema. No pallor.   Psychiatric: She has a normal mood and affect. Her behavior is normal. Judgment and thought content normal.       Lab Review    Results for orders placed or performed in visit on 06/05/17   POC Glucose Fingerstick   Result Value Ref Range    Glucose 183 (A) 70 - 130 mg/dL           Assessment/Plan       ICD-10-CM ICD-9-CM   1. Mixed diabetic hyperlipidemia associated with type 2 diabetes mellitus E11.69 250.80    E78.2 272.2         Glycemic Management:      Lab Results   Component Value Date    HGBA1C 7.16 (H) 05/31/2017    HGBA1C 7.88 (H) 01/27/2017    HGBA1C 7.0 (H) 06/27/2016     Lab Results   Component Value Date    MICROALBUR 2.4 05/31/2017    LDLCALC 102 06/27/2016    CREATININE 0.51 05/31/2017         Diabetes ----        on metformin  mg tablets, 1 gram po bid    invokana 100 mg daily     Change to synjardy 12.5/1000 , 1 tab po bid since has CAD and based on EMPAREG data        never  needed glimepiride    afraid of januvia , victoza , will try januvia     off glyburide    concern about pancreatic cancer      Lipid Management  Lab Results   Component Value Date    CHOL 135 05/31/2017    CHOL 204 (H) 01/27/2017     Lab Results   Component Value Date    TRIG 235 (H) 05/31/2017    TRIG 281 (H) 01/27/2017    TRIG 288 (H) 06/27/2016     Lab Results   Component Value Date    HDL 41 (L) 05/31/2017    HDL 43 (L) 01/27/2017    HDL 36 (L) 06/27/2016     Lab Results   Component Value Date    LDLCALC 102 06/27/2016    LDLCALC 108 10/16/2015    LDLCALC 60 03/30/2015     Lab Results   Component Value Date    LDLDIRECT 66 05/31/2017    LDLDIRECT 116 01/27/2017         LDL elevated        can't handle tricor    on vascepa ( intolerant to lovaza )    niaspan 500 mg ER  taking - continue    tolerating crestor 5 mg but not ever day , this is a much as she can tolerate without developing myaligas    has tried lipitor, zocor, livalo, fluvastatin since 2010 but hasn't been able to tolerate due to myalgias     Try zetia 10 mg daily  And working     Try co q10 , my idea     Blood Pressure Management:  Target blood pressure less than 130/80 mm/hg, Not on Ace Inhibitor/ARB, Control of blood pressure  on coreg cr 20 mg daily and  losartan 25 mg   was on benicar ,  off ACEi due to cough    edema left > r , use compression stockings  Microvascular Complication Monitoring:  No Microalbuminuria, Date of last Microalbumin Assessment 10/08/2015, No Diabetic Retinopathy, No Diabetic Neuropathy  last eye exam 2015 no DR    +  neuropathy   Meralgia paresthetica  Start neurontin 300 mg tid had to back off to 100 mg po bid   no nephropathy    mild microalbuminuria  Immunizations:  Last influenza immunization 2015, Patient prefers not to receive pneumococcal immunization  Preventive Care:  Patient is not smoking  Weight Related  has gained 12 lbs but will lose it     Add contrave to lifestyle changes  Bone Health  Vit D 50 th weekly  and levels are nl   Other Diabetes Related Aspects  Right Thyroid nodule      R nodule , dimensions in HPI , stable from 2008 to 2013     she has Hashimoto's proven on US and + TPO ab, euthyroid June 2013  used to be on Lt4 at 25 mcgs but d/c years ago  follow TSH - nl     Lab Results   Component Value Date    TSH 1.540 05/31/2017         --    10-15  nl B12 but low normal, start b12     having some unsteadiness and numbness      I reviewed and summarized records from Yudi Quiñones MD from 2017 and I reviewed / ordered labs.     Orders Placed This Encounter   Procedures   • POC Glucose Fingerstick         A copy of my note was sent to Yudi Quiñones MD    Please see my above opinion and suggestions.

## 2017-06-06 ENCOUNTER — TELEPHONE (OUTPATIENT)
Dept: ENDOCRINOLOGY | Facility: CLINIC | Age: 62
End: 2017-06-06

## 2017-06-16 RX ORDER — ERGOCALCIFEROL 1.25 MG/1
CAPSULE ORAL
Qty: 12 CAPSULE | Refills: 0 | Status: SHIPPED | OUTPATIENT
Start: 2017-06-16 | End: 2017-09-05 | Stop reason: SDUPTHER

## 2017-06-22 ENCOUNTER — TELEPHONE (OUTPATIENT)
Dept: ENDOCRINOLOGY | Facility: CLINIC | Age: 62
End: 2017-06-22

## 2017-07-27 RX ORDER — ROSUVASTATIN CALCIUM 5 MG/1
5 TABLET, COATED ORAL DAILY
Qty: 30 TABLET | Refills: 5 | Status: SHIPPED | OUTPATIENT
Start: 2017-07-27 | End: 2018-07-09 | Stop reason: SDUPTHER

## 2017-08-28 ENCOUNTER — HOSPITAL ENCOUNTER (EMERGENCY)
Facility: HOSPITAL | Age: 62
Discharge: HOME | End: 2017-08-28
Attending: EMERGENCY MEDICINE | Admitting: EMERGENCY MEDICINE

## 2017-08-28 ENCOUNTER — APPOINTMENT (OUTPATIENT)
Dept: GENERAL RADIOLOGY | Facility: HOSPITAL | Age: 62
End: 2017-08-28

## 2017-08-28 ENCOUNTER — APPOINTMENT (OUTPATIENT)
Dept: CT IMAGING | Facility: HOSPITAL | Age: 62
End: 2017-08-28

## 2017-08-28 VITALS
HEIGHT: 63 IN | HEART RATE: 82 BPM | RESPIRATION RATE: 20 BRPM | BODY MASS INDEX: 26.58 KG/M2 | OXYGEN SATURATION: 98 % | SYSTOLIC BLOOD PRESSURE: 128 MMHG | WEIGHT: 150 LBS | DIASTOLIC BLOOD PRESSURE: 75 MMHG | TEMPERATURE: 97.6 F

## 2017-08-28 DIAGNOSIS — R11.2 NON-INTRACTABLE VOMITING WITH NAUSEA, UNSPECIFIED VOMITING TYPE: Primary | ICD-10-CM

## 2017-08-28 DIAGNOSIS — R42 DIZZINESS: ICD-10-CM

## 2017-08-28 LAB
ALBUMIN SERPL-MCNC: 4.1 G/DL (ref 3.4–4.8)
ALBUMIN/GLOB SERPL: 1.8 G/DL (ref 1.1–1.8)
ALP SERPL-CCNC: 87 U/L (ref 38–126)
ALT SERPL W P-5'-P-CCNC: 41 U/L (ref 9–52)
ANION GAP SERPL CALCULATED.3IONS-SCNC: 14 MMOL/L (ref 5–15)
APTT PPP: 23.8 SECONDS (ref 20–40.3)
AST SERPL-CCNC: 21 U/L (ref 14–36)
BASOPHILS # BLD AUTO: 0.02 10*3/MM3 (ref 0–0.2)
BASOPHILS NFR BLD AUTO: 0.2 % (ref 0–2)
BILIRUB SERPL-MCNC: 0.8 MG/DL (ref 0.2–1.3)
BILIRUB UR QL STRIP: ABNORMAL
BUN BLD-MCNC: 10 MG/DL (ref 7–21)
BUN/CREAT SERPL: 23.8 (ref 7–25)
CALCIUM SPEC-SCNC: 8.8 MG/DL (ref 8.4–10.2)
CHLORIDE SERPL-SCNC: 102 MMOL/L (ref 95–110)
CK MB SERPL-CCNC: 0.92 NG/ML (ref 0–5)
CK SERPL-CCNC: 38 U/L (ref 30–135)
CLARITY UR: CLEAR
CO2 SERPL-SCNC: 21 MMOL/L (ref 22–31)
COLOR UR: YELLOW
CREAT BLD-MCNC: 0.42 MG/DL (ref 0.5–1)
DEPRECATED RDW RBC AUTO: 44 FL (ref 36.4–46.3)
EOSINOPHIL # BLD AUTO: 0.11 10*3/MM3 (ref 0–0.7)
EOSINOPHIL NFR BLD AUTO: 1.1 % (ref 0–7)
ERYTHROCYTE [DISTWIDTH] IN BLOOD BY AUTOMATED COUNT: 13.7 % (ref 11.5–14.5)
GFR SERPL CREATININE-BSD FRML MDRD: 153 ML/MIN/1.73 (ref 45–104)
GLOBULIN UR ELPH-MCNC: 2.3 GM/DL (ref 2.3–3.5)
GLUCOSE BLD-MCNC: 159 MG/DL (ref 60–100)
GLUCOSE BLDC GLUCOMTR-MCNC: 171 MG/DL (ref 70–130)
GLUCOSE UR STRIP-MCNC: ABNORMAL MG/DL
HCT VFR BLD AUTO: 39.7 % (ref 35–45)
HGB BLD-MCNC: 13.2 G/DL (ref 12–15.5)
HGB UR QL STRIP.AUTO: NEGATIVE
IMM GRANULOCYTES # BLD: 0.07 10*3/MM3 (ref 0–0.02)
IMM GRANULOCYTES NFR BLD: 0.7 % (ref 0–0.5)
INR PPP: 0.99 (ref 0.8–1.2)
KETONES UR QL STRIP: ABNORMAL
LEUKOCYTE ESTERASE UR QL STRIP.AUTO: NEGATIVE
LIPASE SERPL-CCNC: 72 U/L (ref 23–300)
LYMPHOCYTES # BLD AUTO: 1.13 10*3/MM3 (ref 0.6–4.2)
LYMPHOCYTES NFR BLD AUTO: 10.9 % (ref 10–50)
MCH RBC QN AUTO: 29.3 PG (ref 26.5–34)
MCHC RBC AUTO-ENTMCNC: 33.2 G/DL (ref 31.4–36)
MCV RBC AUTO: 88.2 FL (ref 80–98)
MONOCYTES # BLD AUTO: 0.53 10*3/MM3 (ref 0–0.9)
MONOCYTES NFR BLD AUTO: 5.1 % (ref 0–12)
NEUTROPHILS # BLD AUTO: 8.49 10*3/MM3 (ref 2–8.6)
NEUTROPHILS NFR BLD AUTO: 82 % (ref 37–80)
NITRITE UR QL STRIP: NEGATIVE
PH UR STRIP.AUTO: <=5 [PH] (ref 5–9)
PLATELET # BLD AUTO: 189 10*3/MM3 (ref 150–450)
PMV BLD AUTO: 10.7 FL (ref 8–12)
POTASSIUM BLD-SCNC: 4.4 MMOL/L (ref 3.5–5.1)
PROT SERPL-MCNC: 6.4 G/DL (ref 6.3–8.6)
PROT UR QL STRIP: NEGATIVE
PROTHROMBIN TIME: 13 SECONDS (ref 11.1–15.3)
RBC # BLD AUTO: 4.5 10*6/MM3 (ref 3.77–5.16)
SODIUM BLD-SCNC: 137 MMOL/L (ref 137–145)
SP GR UR STRIP: 1.04 (ref 1–1.03)
TROPONIN I SERPL-MCNC: <0.012 NG/ML
UROBILINOGEN UR QL STRIP: ABNORMAL
WBC NRBC COR # BLD: 10.35 10*3/MM3 (ref 3.2–9.8)

## 2017-08-28 PROCEDURE — 85730 THROMBOPLASTIN TIME PARTIAL: CPT | Performed by: EMERGENCY MEDICINE

## 2017-08-28 PROCEDURE — 96361 HYDRATE IV INFUSION ADD-ON: CPT

## 2017-08-28 PROCEDURE — 25010000002 ONDANSETRON PER 1 MG: Performed by: EMERGENCY MEDICINE

## 2017-08-28 PROCEDURE — 96375 TX/PRO/DX INJ NEW DRUG ADDON: CPT

## 2017-08-28 PROCEDURE — 93005 ELECTROCARDIOGRAM TRACING: CPT | Performed by: EMERGENCY MEDICINE

## 2017-08-28 PROCEDURE — 96374 THER/PROPH/DIAG INJ IV PUSH: CPT

## 2017-08-28 PROCEDURE — 70450 CT HEAD/BRAIN W/O DYE: CPT

## 2017-08-28 PROCEDURE — 74022 RADEX COMPL AQT ABD SERIES: CPT

## 2017-08-28 PROCEDURE — 85610 PROTHROMBIN TIME: CPT | Performed by: EMERGENCY MEDICINE

## 2017-08-28 PROCEDURE — 82550 ASSAY OF CK (CPK): CPT | Performed by: EMERGENCY MEDICINE

## 2017-08-28 PROCEDURE — 82553 CREATINE MB FRACTION: CPT | Performed by: EMERGENCY MEDICINE

## 2017-08-28 PROCEDURE — 85025 COMPLETE CBC W/AUTO DIFF WBC: CPT | Performed by: EMERGENCY MEDICINE

## 2017-08-28 PROCEDURE — 81003 URINALYSIS AUTO W/O SCOPE: CPT | Performed by: EMERGENCY MEDICINE

## 2017-08-28 PROCEDURE — 83690 ASSAY OF LIPASE: CPT | Performed by: EMERGENCY MEDICINE

## 2017-08-28 PROCEDURE — 99284 EMERGENCY DEPT VISIT MOD MDM: CPT

## 2017-08-28 PROCEDURE — 93010 ELECTROCARDIOGRAM REPORT: CPT | Performed by: INTERNAL MEDICINE

## 2017-08-28 PROCEDURE — 80053 COMPREHEN METABOLIC PANEL: CPT | Performed by: EMERGENCY MEDICINE

## 2017-08-28 PROCEDURE — 82962 GLUCOSE BLOOD TEST: CPT

## 2017-08-28 PROCEDURE — 84484 ASSAY OF TROPONIN QUANT: CPT | Performed by: EMERGENCY MEDICINE

## 2017-08-28 RX ORDER — LORAZEPAM 2 MG/ML
0.5 INJECTION INTRAMUSCULAR ONCE
Status: DISCONTINUED | OUTPATIENT
Start: 2017-08-28 | End: 2017-08-28 | Stop reason: HOSPADM

## 2017-08-28 RX ORDER — SODIUM CHLORIDE 9 MG/ML
125 INJECTION, SOLUTION INTRAVENOUS CONTINUOUS
Status: DISCONTINUED | OUTPATIENT
Start: 2017-08-28 | End: 2017-08-28 | Stop reason: HOSPADM

## 2017-08-28 RX ORDER — ONDANSETRON 2 MG/ML
4 INJECTION INTRAMUSCULAR; INTRAVENOUS ONCE
Status: COMPLETED | OUTPATIENT
Start: 2017-08-28 | End: 2017-08-28

## 2017-08-28 RX ORDER — FAMOTIDINE 10 MG/ML
20 INJECTION, SOLUTION INTRAVENOUS EVERY 12 HOURS SCHEDULED
Status: DISCONTINUED | OUTPATIENT
Start: 2017-08-28 | End: 2017-08-28 | Stop reason: HOSPADM

## 2017-08-28 RX ORDER — ONDANSETRON 4 MG/1
4 TABLET, FILM COATED ORAL EVERY 8 HOURS PRN
Qty: 12 TABLET | Refills: 0 | Status: SHIPPED | OUTPATIENT
Start: 2017-08-28 | End: 2018-09-18

## 2017-08-28 RX ORDER — SODIUM CHLORIDE 0.9 % (FLUSH) 0.9 %
10 SYRINGE (ML) INJECTION AS NEEDED
Status: DISCONTINUED | OUTPATIENT
Start: 2017-08-28 | End: 2017-08-28 | Stop reason: HOSPADM

## 2017-08-28 RX ORDER — MECLIZINE HYDROCHLORIDE 25 MG/1
25 TABLET ORAL 4 TIMES DAILY PRN
Qty: 25 TABLET | Refills: 0 | Status: SHIPPED | OUTPATIENT
Start: 2017-08-28 | End: 2018-09-18

## 2017-08-28 RX ADMIN — Medication 10 ML: at 16:03

## 2017-08-28 RX ADMIN — ONDANSETRON 4 MG: 2 INJECTION INTRAMUSCULAR; INTRAVENOUS at 15:59

## 2017-08-28 RX ADMIN — FAMOTIDINE 20 MG: 10 INJECTION INTRAVENOUS at 15:59

## 2017-08-28 RX ADMIN — SODIUM CHLORIDE 500 ML: 9 INJECTION, SOLUTION INTRAVENOUS at 16:00

## 2017-08-29 ENCOUNTER — TELEPHONE (OUTPATIENT)
Dept: FAMILY MEDICINE CLINIC | Facility: CLINIC | Age: 62
End: 2017-08-29

## 2017-08-29 NOTE — TELEPHONE ENCOUNTER
CALLED PT TO SCHEDULE HER AN APPT FOR A ER FOLLOW UP, PT SAID THAT SHE DID NOT WANT TO BE SEEN AT THIS TIME.   SAID THAT IF SHE COULD NOT GET IN WITH HER PCP SHE DIDN'T WANT TO COME IN AND SAID THAT SHE WAS FEELING A LITTER BETTER.    GOING BACK TO WORK TOMORROW    TOLD PT THAT SHOULD THAT CHANGE OR SHE WISH TO GET IN WITH SOMEONE WHEN HER PCP IS OVER THEM TO CALL US.

## 2017-09-06 RX ORDER — ERGOCALCIFEROL 1.25 MG/1
CAPSULE ORAL
Qty: 12 CAPSULE | Refills: 0 | Status: SHIPPED | OUTPATIENT
Start: 2017-09-06 | End: 2017-11-28 | Stop reason: SDUPTHER

## 2017-11-28 RX ORDER — ERGOCALCIFEROL 1.25 MG/1
CAPSULE ORAL
Qty: 12 CAPSULE | Refills: 0 | Status: SHIPPED | OUTPATIENT
Start: 2017-11-28 | End: 2018-02-22 | Stop reason: SDUPTHER

## 2017-12-12 RX ORDER — EMPAGLIFLOZIN AND METFORMIN HYDROCHLORIDE 12.5; 1 MG/1; MG/1
TABLET ORAL
Qty: 60 TABLET | Refills: 0 | Status: SHIPPED | OUTPATIENT
Start: 2017-12-12 | End: 2018-01-06 | Stop reason: SDUPTHER

## 2017-12-12 RX ORDER — EZETIMIBE 10 MG/1
TABLET ORAL
Qty: 30 TABLET | Refills: 0 | Status: SHIPPED | OUTPATIENT
Start: 2017-12-12 | End: 2018-01-06 | Stop reason: SDUPTHER

## 2018-01-08 RX ORDER — EZETIMIBE 10 MG/1
TABLET ORAL
Qty: 30 TABLET | Refills: 0 | Status: SHIPPED | OUTPATIENT
Start: 2018-01-08 | End: 2018-02-03 | Stop reason: SDUPTHER

## 2018-01-08 RX ORDER — EMPAGLIFLOZIN AND METFORMIN HYDROCHLORIDE 12.5; 1 MG/1; MG/1
TABLET ORAL
Qty: 60 TABLET | Refills: 0 | Status: SHIPPED | OUTPATIENT
Start: 2018-01-08 | End: 2018-02-03 | Stop reason: SDUPTHER

## 2018-01-11 ENCOUNTER — LAB (OUTPATIENT)
Dept: LAB | Facility: HOSPITAL | Age: 63
End: 2018-01-11

## 2018-01-11 DIAGNOSIS — E11.59 HYPERTENSION ASSOCIATED WITH DIABETES (HCC): ICD-10-CM

## 2018-01-11 DIAGNOSIS — E78.2 MIXED DIABETIC HYPERLIPIDEMIA ASSOCIATED WITH TYPE 2 DIABETES MELLITUS (HCC): ICD-10-CM

## 2018-01-11 DIAGNOSIS — E53.8 B12 DEFICIENCY: ICD-10-CM

## 2018-01-11 DIAGNOSIS — I25.10 CORONARY ARTERY DISEASE INVOLVING NATIVE CORONARY ARTERY OF NATIVE HEART, ANGINA PRESENCE UNSPECIFIED: ICD-10-CM

## 2018-01-11 DIAGNOSIS — I43 TYPE 2 DIABETES MELLITUS WITH DIABETIC CARDIOMYOPATHY (HCC): ICD-10-CM

## 2018-01-11 DIAGNOSIS — E11.69 MIXED DIABETIC HYPERLIPIDEMIA ASSOCIATED WITH TYPE 2 DIABETES MELLITUS (HCC): ICD-10-CM

## 2018-01-11 DIAGNOSIS — E11.69 TYPE 2 DIABETES MELLITUS WITH DIABETIC CARDIOMYOPATHY (HCC): ICD-10-CM

## 2018-01-11 DIAGNOSIS — E55.9 VITAMIN D DEFICIENCY: ICD-10-CM

## 2018-01-11 DIAGNOSIS — I15.2 HYPERTENSION ASSOCIATED WITH DIABETES (HCC): ICD-10-CM

## 2018-01-11 LAB
25(OH)D3 SERPL-MCNC: 45.4 NG/ML (ref 30–100)
ALBUMIN SERPL-MCNC: 4 G/DL (ref 3.4–4.8)
ALBUMIN UR-MCNC: 0.8 MG/L
ALBUMIN/GLOB SERPL: 1.5 G/DL (ref 1.1–1.8)
ALP SERPL-CCNC: 102 U/L (ref 38–126)
ALT SERPL W P-5'-P-CCNC: 31 U/L (ref 9–52)
ANION GAP SERPL CALCULATED.3IONS-SCNC: 10 MMOL/L (ref 5–15)
ARTICHOKE IGE QN: 80 MG/DL (ref 1–129)
AST SERPL-CCNC: 81 U/L (ref 14–36)
BASOPHILS # BLD AUTO: 0.03 10*3/MM3 (ref 0–0.2)
BASOPHILS NFR BLD AUTO: 0.5 % (ref 0–2)
BILIRUB SERPL-MCNC: 0.6 MG/DL (ref 0.2–1.3)
BUN BLD-MCNC: 11 MG/DL (ref 7–21)
BUN/CREAT SERPL: 23.4 (ref 7–25)
CALCIUM SPEC-SCNC: 9.1 MG/DL (ref 8.4–10.2)
CHLORIDE SERPL-SCNC: 101 MMOL/L (ref 95–110)
CHOLEST SERPL-MCNC: 138 MG/DL (ref 0–199)
CO2 SERPL-SCNC: 27 MMOL/L (ref 22–31)
CREAT BLD-MCNC: 0.47 MG/DL (ref 0.5–1)
CREAT UR-MCNC: 45.4 MG/DL
DEPRECATED RDW RBC AUTO: 43.6 FL (ref 36.4–46.3)
EOSINOPHIL # BLD AUTO: 0.17 10*3/MM3 (ref 0–0.7)
EOSINOPHIL NFR BLD AUTO: 3.1 % (ref 0–7)
ERYTHROCYTE [DISTWIDTH] IN BLOOD BY AUTOMATED COUNT: 13.4 % (ref 11.5–14.5)
GFR SERPL CREATININE-BSD FRML MDRD: 135 ML/MIN/1.73 (ref 45–104)
GLOBULIN UR ELPH-MCNC: 2.6 GM/DL (ref 2.3–3.5)
GLUCOSE BLD-MCNC: 144 MG/DL (ref 60–100)
HBA1C MFR BLD: 6.8 % (ref 4–5.6)
HCT VFR BLD AUTO: 41.7 % (ref 35–45)
HDLC SERPL-MCNC: 39 MG/DL (ref 60–200)
HGB BLD-MCNC: 13.8 G/DL (ref 12–15.5)
IMM GRANULOCYTES # BLD: 0.05 10*3/MM3 (ref 0–0.02)
IMM GRANULOCYTES NFR BLD: 0.9 % (ref 0–0.5)
LDLC/HDLC SERPL: 1.59 {RATIO} (ref 0–3.22)
LYMPHOCYTES # BLD AUTO: 1.49 10*3/MM3 (ref 0.6–4.2)
LYMPHOCYTES NFR BLD AUTO: 27 % (ref 10–50)
MAGNESIUM SERPL-MCNC: 2.1 MG/DL (ref 1.6–2.3)
MCH RBC QN AUTO: 29.7 PG (ref 26.5–34)
MCHC RBC AUTO-ENTMCNC: 33.1 G/DL (ref 31.4–36)
MCV RBC AUTO: 89.9 FL (ref 80–98)
MICROALBUMIN/CREAT UR: 17.6 MG/G (ref 0–30)
MONOCYTES # BLD AUTO: 0.37 10*3/MM3 (ref 0–0.9)
MONOCYTES NFR BLD AUTO: 6.7 % (ref 0–12)
NEUTROPHILS # BLD AUTO: 3.4 10*3/MM3 (ref 2–8.6)
NEUTROPHILS NFR BLD AUTO: 61.8 % (ref 37–80)
PLATELET # BLD AUTO: 243 10*3/MM3 (ref 150–450)
PMV BLD AUTO: 11.2 FL (ref 8–12)
POTASSIUM BLD-SCNC: 4.5 MMOL/L (ref 3.5–5.1)
PROT SERPL-MCNC: 6.6 G/DL (ref 6.3–8.6)
RBC # BLD AUTO: 4.64 10*6/MM3 (ref 3.77–5.16)
SODIUM BLD-SCNC: 138 MMOL/L (ref 137–145)
TRIGL SERPL-MCNC: 185 MG/DL (ref 20–199)
TSH SERPL DL<=0.05 MIU/L-ACNC: 1.6 MIU/ML (ref 0.46–4.68)
VIT B12 BLD-MCNC: 258 PG/ML (ref 239–931)
WBC NRBC COR # BLD: 5.51 10*3/MM3 (ref 3.2–9.8)

## 2018-01-11 PROCEDURE — 82043 UR ALBUMIN QUANTITATIVE: CPT

## 2018-01-11 PROCEDURE — 84443 ASSAY THYROID STIM HORMONE: CPT

## 2018-01-11 PROCEDURE — 80053 COMPREHEN METABOLIC PANEL: CPT

## 2018-01-11 PROCEDURE — 80061 LIPID PANEL: CPT

## 2018-01-11 PROCEDURE — 82306 VITAMIN D 25 HYDROXY: CPT

## 2018-01-11 PROCEDURE — 85025 COMPLETE CBC W/AUTO DIFF WBC: CPT

## 2018-01-11 PROCEDURE — 82607 VITAMIN B-12: CPT

## 2018-01-11 PROCEDURE — 83036 HEMOGLOBIN GLYCOSYLATED A1C: CPT

## 2018-01-11 PROCEDURE — 83735 ASSAY OF MAGNESIUM: CPT

## 2018-01-11 PROCEDURE — 82570 ASSAY OF URINE CREATININE: CPT

## 2018-01-11 PROCEDURE — 36415 COLL VENOUS BLD VENIPUNCTURE: CPT

## 2018-01-26 ENCOUNTER — OFFICE VISIT (OUTPATIENT)
Dept: ENDOCRINOLOGY | Facility: CLINIC | Age: 63
End: 2018-01-26

## 2018-01-26 VITALS
WEIGHT: 161.4 LBS | HEIGHT: 63 IN | SYSTOLIC BLOOD PRESSURE: 134 MMHG | BODY MASS INDEX: 28.6 KG/M2 | DIASTOLIC BLOOD PRESSURE: 80 MMHG

## 2018-01-26 DIAGNOSIS — I15.2 HYPERTENSION ASSOCIATED WITH DIABETES (HCC): ICD-10-CM

## 2018-01-26 DIAGNOSIS — I25.10 CORONARY ARTERY DISEASE INVOLVING NATIVE CORONARY ARTERY OF NATIVE HEART, ANGINA PRESENCE UNSPECIFIED: ICD-10-CM

## 2018-01-26 DIAGNOSIS — E11.59 HYPERTENSION ASSOCIATED WITH DIABETES (HCC): ICD-10-CM

## 2018-01-26 DIAGNOSIS — E78.2 MIXED DIABETIC HYPERLIPIDEMIA ASSOCIATED WITH TYPE 2 DIABETES MELLITUS (HCC): Primary | ICD-10-CM

## 2018-01-26 DIAGNOSIS — G57.12 MERALGIA PARAESTHETICA, LEFT: ICD-10-CM

## 2018-01-26 DIAGNOSIS — I43 TYPE 2 DIABETES MELLITUS WITH DIABETIC CARDIOMYOPATHY (HCC): ICD-10-CM

## 2018-01-26 DIAGNOSIS — E11.69 MIXED DIABETIC HYPERLIPIDEMIA ASSOCIATED WITH TYPE 2 DIABETES MELLITUS (HCC): Primary | ICD-10-CM

## 2018-01-26 DIAGNOSIS — E53.8 B12 DEFICIENCY: ICD-10-CM

## 2018-01-26 DIAGNOSIS — E11.69 TYPE 2 DIABETES MELLITUS WITH DIABETIC CARDIOMYOPATHY (HCC): ICD-10-CM

## 2018-01-26 DIAGNOSIS — E55.9 VITAMIN D DEFICIENCY: ICD-10-CM

## 2018-01-26 LAB — GLUCOSE BLDC GLUCOMTR-MCNC: 134 MG/DL (ref 70–130)

## 2018-01-26 PROCEDURE — 99214 OFFICE O/P EST MOD 30 MIN: CPT | Performed by: INTERNAL MEDICINE

## 2018-01-26 PROCEDURE — 82962 GLUCOSE BLOOD TEST: CPT | Performed by: INTERNAL MEDICINE

## 2018-01-26 RX ORDER — LOSARTAN POTASSIUM 25 MG/1
1 TABLET ORAL DAILY
Refills: 0 | COMMUNITY
Start: 2017-11-16 | End: 2020-05-14 | Stop reason: SDUPTHER

## 2018-01-26 NOTE — PROGRESS NOTES
Bonnie Garcia is a 62 y.o. female who presents for  evaluation of   Chief Complaint   Patient presents with   • Diabetes         Primary Care / Referring Provider  Yudi Quiñones MD      Duration/Timing:  Diabetes mellitus type 2, Age at onset of diabetes: 58 years, Onset of symptoms gradual  Timing - constant  Quality - controlled  Severity - high since associated CAD    alleviating factors - degree of understanding/education    Severity (Complications/Hospitalizations)  Secondary Macrovascular Complications:  CAD, No CVA, No PAD  has 50 to 50% LAD     PTCI , stent x 5    2 in proximal LAD    1 in circumflex    may need heart surgery   Secondary Microvascular Complications:  No Diabetic Nephropathy, No Diabetic Retinopathy, No Diabetic Neuropathy    Context  Diabetes Regimen:  Oral Medications  Blood Glucose Readings  at goal  Diet  variable carb intake  Exercise:  Does not exercise    Associated Signs/Symptoms  Hyperglycemic Symptoms:  No polyuria, No polydipsia, No polyphagia, No blurred vision, Weight gain, Weight loss/gain amount 12 lbs lbs since last appointment  Hypoglycemic Episodes:  No documented hypoglycemia  Also h o thyroid nodule  location - right  timing constant  not progressive  severity - low -  bx , Sept 2008 - benign   biochemically euthyroid , off synthroid but used to be on 25 mcgs daily     thyroid US personally reviewed    Sept 2008    1.8 x 1.7 x 1.4 , right , solid , heterogeneous, hypoechoic.     Repeat US June 2013    r solid nodule stable in size, echogenicity consistent w Hashimoto's       Past Medical History:   Diagnosis Date   • Chicken pox    • Coronary arteriosclerosis    • Diabetes mellitus     TYPE 2   • Diastasis of muscle    • GERD (gastroesophageal reflux disease)    • H/O Hashimoto thyroiditis    • History of echocardiogram 01/01/2007    ef 35-40% diastolic dysfunction   • History of mammogram 04/13/2015   • History of myocardial infarction 2006   • History of  Papanicolaou smear of cervix 2004    negative   • Hyperlipidemia    • Hypertension    • Measles    • Mild concentric left ventricular hypertrophy (LVH)    • Thyroid nodule    • Vitamin D deficiency      Family History   Problem Relation Age of Onset   • Diabetes Mother    • Coronary artery disease Mother    • Heart attack Father       at 53 multiple mi's   • Coronary artery disease Other    • Diabetes Other    • Thyroid disease Other    • Hypothyroidism Daughter      Social History   Substance Use Topics   • Smoking status: Former Smoker     Types: Cigarettes   • Smokeless tobacco: Never Used      Comment: smoked for 5 years   • Alcohol use No         Current Outpatient Prescriptions:   •  aspirin 325 MG tablet, Take 325 mg by mouth Daily., Disp: , Rfl:   •  carvedilol CR (COREG CR) 20 MG 24 hr capsule, Take 20 mg by mouth Daily., Disp: , Rfl:   •  ezetimibe (ZETIA) 10 MG tablet, TAKE 1 TABLET BY MOUTH DAILY, Disp: 30 tablet, Rfl: 0  •  glucose blood test strip, 1 each by Other route As Needed. Use as instructed, Disp: , Rfl:   •  icosapent ethyl (VASCEPA) 1 G capsule capsule, Take 2 g by mouth 2 (Two) Times a Day With Meals., Disp: , Rfl:   •  losartan (COZAAR) 25 MG tablet, 1 tablet., Disp: , Rfl: 0  •  meclizine (ANTIVERT) 25 MG tablet, Take 1 tablet by mouth 4 (Four) Times a Day As Needed for dizziness., Disp: 25 tablet, Rfl: 0  •  ondansetron (ZOFRAN) 4 MG tablet, Take 1 tablet by mouth Every 8 (Eight) Hours As Needed for Nausea or Vomiting., Disp: 12 tablet, Rfl: 0  •  prasugrel (EFFIENT) 10 MG tablet, Take 10 mg by mouth Daily., Disp: , Rfl:   •  ranolazine (RANEXA) 500 MG 12 hr tablet, Take 500 mg by mouth 2 (Two) Times a Day., Disp: , Rfl:   •  rosuvastatin (CRESTOR) 5 MG tablet, Take 1 tablet by mouth Daily., Disp: 30 tablet, Rfl: 5  •  SITagliptin (JANUVIA) 100 MG tablet, 100 mg by mouth  daily before breakfast, Disp: 30 tablet, Rfl: 11  •  SYNJARDY 12.5-1000 MG tablet, TAKE 1 TABLET BY MOUTH  "TWICE DAILY WITH MEALS, Disp: 60 tablet, Rfl: 0  •  vitamin D (ERGOCALCIFEROL) 17765 units capsule capsule, TAKE 1 CAPSULE BY MOUTH ONCE A WEEK, Disp: 12 capsule, Rfl: 0    Review of Systems    Review of Systems   Constitutional: Negative for activity change, appetite change, chills, diaphoresis, fatigue, fever and unexpected weight change.   HENT: Negative for congestion, dental problem, drooling, ear discharge, ear pain, facial swelling, mouth sores, postnasal drip, rhinorrhea, sinus pressure, sore throat, tinnitus, trouble swallowing and voice change.    Eyes: Negative for photophobia, pain, discharge, redness, itching and visual disturbance.   Respiratory: Negative for apnea, cough, choking, chest tightness, shortness of breath, wheezing and stridor.    Cardiovascular: Negative for chest pain, palpitations and leg swelling.   Gastrointestinal: Negative for abdominal distention, abdominal pain, constipation, diarrhea, nausea and vomiting.   Endocrine: Negative for cold intolerance, heat intolerance, polydipsia, polyphagia and polyuria.   Genitourinary: Negative for decreased urine volume, difficulty urinating, dysuria, flank pain, frequency, hematuria and urgency.   Musculoskeletal: Positive for myalgias. Negative for arthralgias, back pain, gait problem, joint swelling, neck pain and neck stiffness.   Skin: Negative for color change, pallor, rash and wound.   Allergic/Immunologic: Negative for immunocompromised state.   Neurological: Negative for dizziness, tremors, seizures, syncope, facial asymmetry, speech difficulty, weakness, light-headedness, numbness and headaches.   Hematological: Negative for adenopathy.   Psychiatric/Behavioral: Negative for agitation, behavioral problems, confusion, decreased concentration, dysphoric mood, hallucinations, self-injury, sleep disturbance and suicidal ideas. The patient is not nervous/anxious and is not hyperactive.         Objective:     /72  Ht 160 cm (63\")  Wt " 73.2 kg (161 lb 6.4 oz)  BMI 28.59 kg/m2    Physical Exam   Constitutional: She is oriented to person, place, and time. She appears well-developed.   HENT:   Head: Normocephalic.   Right Ear: External ear normal.   Left Ear: External ear normal.   Nose: Nose normal.   Eyes: Conjunctivae and EOM are normal. No scleral icterus.   Neck: Normal range of motion. Neck supple. No tracheal deviation present. No thyromegaly present.   Cardiovascular: Normal rate, regular rhythm, normal heart sounds and intact distal pulses.  Exam reveals no gallop and no friction rub.    No murmur heard.  Pulmonary/Chest: Effort normal and breath sounds normal. No stridor. No respiratory distress. She has no wheezes. She has no rales. She exhibits no tenderness.   Abdominal: Soft. Bowel sounds are normal. She exhibits no distension and no mass. There is no tenderness. There is no rebound and no guarding.   Musculoskeletal: Normal range of motion. She exhibits no tenderness or deformity.   Lymphadenopathy:     She has no cervical adenopathy.   Neurological: She is alert and oriented to person, place, and time. She displays normal reflexes. She exhibits normal muscle tone. Coordination normal.   Skin: No rash noted. No erythema. No pallor.   Psychiatric: She has a normal mood and affect. Her behavior is normal. Judgment and thought content normal.       Lab Review    Results for orders placed or performed in visit on 01/26/18   POC Glucose Fingerstick   Result Value Ref Range    Glucose 134 (A) 70 - 130 mg/dL           Assessment/Plan       ICD-10-CM ICD-9-CM   1. Mixed diabetic hyperlipidemia associated with type 2 diabetes mellitus E11.69 250.80    E78.2 272.2   2. Hypertension associated with diabetes E11.59 250.80    I10 401.9   3. Type 2 diabetes mellitus with diabetic cardiomyopathy E11.69 250.80    I43 425.7   4. Vitamin D deficiency E55.9 268.9   5. B12 deficiency E53.8 266.2   6. Coronary artery disease involving native coronary  artery of native heart, angina presence unspecified I25.10 414.01   7. Meralgia paraesthetica, left G57.12 355.1         Glycemic Management:      Lab Results   Component Value Date    HGBA1C 6.8 (H) 01/11/2018    HGBA1C 7.16 (H) 05/31/2017    HGBA1C 7.88 (H) 01/27/2017     Lab Results   Component Value Date    MICROALBUR 0.8 01/11/2018    LDLCALC 102 06/27/2016    CREATININE 0.47 (L) 01/11/2018         Diabetes ----    On synjary and januvia    Afraid of glp1 due to pancreatic cancer concern       Lipid Management  Lab Results   Component Value Date    CHOL 138 01/11/2018    CHOL 135 05/31/2017    CHOL 204 (H) 01/27/2017     Lab Results   Component Value Date    TRIG 185 01/11/2018    TRIG 235 (H) 05/31/2017    TRIG 281 (H) 01/27/2017     Lab Results   Component Value Date    HDL 39 (L) 01/11/2018    HDL 41 (L) 05/31/2017    HDL 43 (L) 01/27/2017     Lab Results   Component Value Date    LDLCALC 102 06/27/2016    LDLCALC 108 10/16/2015    LDLCALC 60 03/30/2015     Lab Results   Component Value Date    LDLDIRECT 80 01/11/2018    LDLDIRECT 66 05/31/2017    LDLDIRECT 116 01/27/2017         LDL elevated    can't handle tricor    on vascepa ( intolerant to lovaza )    niaspan 500 mg ER  taking - not approved and not needed     Tried  crestor 5 mg but developed myalgias in 2015    has tried lipitor, zocor, livalo, fluvastatin since 2010 but hasn't been able to tolerate due to myalgias     Tried zetia 10 mg daily and is tolerating partially and LDL is 80 with its use     Add repatha      Blood Pressure Management:  Target blood pressure less than 130/80 mm/hg, Not on Ace Inhibitor/ARB, Control of blood pressure  on coreg cr 20 mg daily and  losartan 25 mg   was on benicar ,  off ACEi due to cough    edema left > r , use compression stockings  Microvascular Complication Monitoring:  No Microalbuminuria, Date of last Microalbumin Assessment 10/08/2015, No Diabetic Retinopathy, No Diabetic Neuropathy  last eye exam 2015 no  DR    +  neuropathy   Meralgia paresthetica  Start neurontin 300 mg tid had to back off to 100 mg po bid - can't tolerate   no nephropathy    mild microalbuminuria  Immunizations:  Last influenza immunization 2015, Patient prefers not to receive pneumococcal immunization  Preventive Care:  Patient is not smoking  Weight Related  has gained 12 lbs but will lose it     Add contrave to lifestyle changes - felt she was going to die    Add belviq      Bone Health  Vit D 50 th weekly and levels are nl   Other Diabetes Related Aspects  Right Thyroid nodule      R nodule , dimensions in HPI , stable from 2008 to 2013     she has Hashimoto's proven on US and + TPO ab, euthyroid June 2013  used to be on Lt4 at 25 mcgs but d/c years ago  follow TSH - nl     Lab Results   Component Value Date    TSH 1.600 01/11/2018         --    10-15  nl B12 but low normal, start b12     having some unsteadiness and numbness      I reviewed and summarized records from Yudi Quiñones MD from 2017 and I reviewed / ordered labs.     Orders Placed This Encounter   Procedures   • POC Glucose Fingerstick         A copy of my note was sent to Yudi Quiñones MD    Please see my above opinion and suggestions.

## 2018-02-05 RX ORDER — EZETIMIBE 10 MG/1
TABLET ORAL
Qty: 30 TABLET | Refills: 0 | Status: SHIPPED | OUTPATIENT
Start: 2018-02-05 | End: 2018-03-12 | Stop reason: SDUPTHER

## 2018-02-05 RX ORDER — EMPAGLIFLOZIN AND METFORMIN HYDROCHLORIDE 12.5; 1 MG/1; MG/1
TABLET ORAL
Qty: 60 TABLET | Refills: 0 | Status: SHIPPED | OUTPATIENT
Start: 2018-02-05 | End: 2018-03-12 | Stop reason: SDUPTHER

## 2018-02-14 ENCOUNTER — TELEPHONE (OUTPATIENT)
Dept: ENDOCRINOLOGY | Facility: CLINIC | Age: 63
End: 2018-02-14

## 2018-02-22 RX ORDER — ERGOCALCIFEROL 1.25 MG/1
CAPSULE ORAL
Qty: 12 CAPSULE | Refills: 0 | Status: CANCELLED | OUTPATIENT
Start: 2018-02-22

## 2018-02-22 RX ORDER — ERGOCALCIFEROL 1.25 MG/1
50000 CAPSULE ORAL
Qty: 12 CAPSULE | Refills: 3 | Status: SHIPPED | OUTPATIENT
Start: 2018-02-22 | End: 2019-04-09 | Stop reason: SDUPTHER

## 2018-02-28 ENCOUNTER — TELEPHONE (OUTPATIENT)
Dept: ENDOCRINOLOGY | Facility: CLINIC | Age: 63
End: 2018-02-28

## 2018-03-13 RX ORDER — ROSUVASTATIN CALCIUM 5 MG/1
5 TABLET, COATED ORAL DAILY
Qty: 30 TABLET | Refills: 0 | Status: SHIPPED | OUTPATIENT
Start: 2018-03-13 | End: 2018-03-29 | Stop reason: SDUPTHER

## 2018-03-13 RX ORDER — EZETIMIBE 10 MG/1
TABLET ORAL
Qty: 30 TABLET | Refills: 0 | Status: SHIPPED | OUTPATIENT
Start: 2018-03-13 | End: 2018-04-09 | Stop reason: SDUPTHER

## 2018-03-13 RX ORDER — EMPAGLIFLOZIN AND METFORMIN HYDROCHLORIDE 12.5; 1 MG/1; MG/1
TABLET ORAL
Qty: 60 TABLET | Refills: 0 | Status: SHIPPED | OUTPATIENT
Start: 2018-03-13 | End: 2018-04-23 | Stop reason: SDUPTHER

## 2018-03-29 ENCOUNTER — APPOINTMENT (OUTPATIENT)
Dept: LAB | Facility: HOSPITAL | Age: 63
End: 2018-03-29

## 2018-03-29 ENCOUNTER — OFFICE VISIT (OUTPATIENT)
Dept: FAMILY MEDICINE CLINIC | Facility: CLINIC | Age: 63
End: 2018-03-29

## 2018-03-29 VITALS
HEIGHT: 63 IN | SYSTOLIC BLOOD PRESSURE: 110 MMHG | WEIGHT: 159 LBS | DIASTOLIC BLOOD PRESSURE: 70 MMHG | OXYGEN SATURATION: 97 % | BODY MASS INDEX: 28.17 KG/M2 | HEART RATE: 95 BPM

## 2018-03-29 DIAGNOSIS — Z23 NEED FOR DIPHTHERIA-TETANUS-PERTUSSIS (TDAP) VACCINE, ADULT/ADOLESCENT: ICD-10-CM

## 2018-03-29 DIAGNOSIS — Z12.31 SCREENING MAMMOGRAM, ENCOUNTER FOR: ICD-10-CM

## 2018-03-29 DIAGNOSIS — R19.00 ABDOMINAL WALL MASS OF RIGHT FLANK: ICD-10-CM

## 2018-03-29 DIAGNOSIS — K21.9 GASTROESOPHAGEAL REFLUX DISEASE, ESOPHAGITIS PRESENCE NOT SPECIFIED: ICD-10-CM

## 2018-03-29 DIAGNOSIS — Z77.22 EXPOSURE TO SECOND HAND SMOKE: ICD-10-CM

## 2018-03-29 DIAGNOSIS — R05.9 COUGH: ICD-10-CM

## 2018-03-29 DIAGNOSIS — R10.30 LOWER ABDOMINAL PAIN: ICD-10-CM

## 2018-03-29 DIAGNOSIS — R22.2 MASS OF SUBCUTANEOUS TISSUE OF BACK: Primary | ICD-10-CM

## 2018-03-29 DIAGNOSIS — Z23 NEED FOR SHINGLES VACCINE: ICD-10-CM

## 2018-03-29 DIAGNOSIS — Z11.59 ENCOUNTER FOR HEPATITIS C SCREENING TEST FOR LOW RISK PATIENT: ICD-10-CM

## 2018-03-29 DIAGNOSIS — E11.59 TYPE 2 DIABETES MELLITUS WITH OTHER CIRCULATORY COMPLICATION, WITHOUT LONG-TERM CURRENT USE OF INSULIN (HCC): ICD-10-CM

## 2018-03-29 LAB
ALBUMIN SERPL-MCNC: 4.4 G/DL (ref 3.4–4.8)
ALBUMIN/GLOB SERPL: 1.5 G/DL (ref 1.1–1.8)
ALP SERPL-CCNC: 98 U/L (ref 38–126)
ALT SERPL W P-5'-P-CCNC: 29 U/L (ref 9–52)
ANION GAP SERPL CALCULATED.3IONS-SCNC: 17 MMOL/L (ref 5–15)
AST SERPL-CCNC: 72 U/L (ref 14–36)
BILIRUB SERPL-MCNC: 0.5 MG/DL (ref 0.2–1.3)
BUN BLD-MCNC: 13 MG/DL (ref 7–21)
BUN/CREAT SERPL: 22.4 (ref 7–25)
CALCIUM SPEC-SCNC: 9.6 MG/DL (ref 8.4–10.2)
CHLORIDE SERPL-SCNC: 100 MMOL/L (ref 95–110)
CO2 SERPL-SCNC: 24 MMOL/L (ref 22–31)
CREAT BLD-MCNC: 0.58 MG/DL (ref 0.5–1)
GFR SERPL CREATININE-BSD FRML MDRD: 105 ML/MIN/1.73 (ref 60–104)
GLOBULIN UR ELPH-MCNC: 2.9 GM/DL (ref 2.3–3.5)
GLUCOSE BLD-MCNC: 151 MG/DL (ref 60–100)
POTASSIUM BLD-SCNC: 4.5 MMOL/L (ref 3.5–5.1)
PROT SERPL-MCNC: 7.3 G/DL (ref 6.3–8.6)
SODIUM BLD-SCNC: 141 MMOL/L (ref 137–145)

## 2018-03-29 PROCEDURE — 86803 HEPATITIS C AB TEST: CPT | Performed by: FAMILY MEDICINE

## 2018-03-29 PROCEDURE — 36415 COLL VENOUS BLD VENIPUNCTURE: CPT | Performed by: FAMILY MEDICINE

## 2018-03-29 PROCEDURE — 99214 OFFICE O/P EST MOD 30 MIN: CPT | Performed by: FAMILY MEDICINE

## 2018-03-29 PROCEDURE — 80053 COMPREHEN METABOLIC PANEL: CPT | Performed by: FAMILY MEDICINE

## 2018-03-29 PROCEDURE — 90715 TDAP VACCINE 7 YRS/> IM: CPT | Performed by: FAMILY MEDICINE

## 2018-03-29 PROCEDURE — 90471 IMMUNIZATION ADMIN: CPT | Performed by: FAMILY MEDICINE

## 2018-03-30 LAB — HCV AB SER DONR QL: NEGATIVE

## 2018-04-03 RX ORDER — PREDNISONE 10 MG/1
TABLET ORAL
Qty: 15 TABLET | Refills: 0 | Status: SHIPPED | OUTPATIENT
Start: 2018-04-03 | End: 2018-06-19

## 2018-04-03 RX ORDER — DIPHENHYDRAMINE HCL 25 MG
50 CAPSULE ORAL ONCE
Qty: 2 CAPSULE | Refills: 0 | Status: SHIPPED | OUTPATIENT
Start: 2018-04-03 | End: 2018-04-03

## 2018-04-04 ENCOUNTER — CLINICAL SUPPORT (OUTPATIENT)
Dept: FAMILY MEDICINE CLINIC | Facility: CLINIC | Age: 63
End: 2018-04-04

## 2018-04-04 ENCOUNTER — HOSPITAL ENCOUNTER (OUTPATIENT)
Dept: CT IMAGING | Facility: HOSPITAL | Age: 63
Discharge: HOME | End: 2018-04-04
Attending: FAMILY MEDICINE | Admitting: FAMILY MEDICINE

## 2018-04-04 ENCOUNTER — APPOINTMENT (OUTPATIENT)
Dept: CT IMAGING | Facility: HOSPITAL | Age: 63
End: 2018-04-04
Attending: FAMILY MEDICINE

## 2018-04-04 DIAGNOSIS — Z29.9 PROPHYLACTIC MEASURE: Primary | ICD-10-CM

## 2018-04-04 PROCEDURE — 71260 CT THORAX DX C+: CPT

## 2018-04-04 PROCEDURE — 96372 THER/PROPH/DIAG INJ SC/IM: CPT | Performed by: FAMILY MEDICINE

## 2018-04-04 PROCEDURE — 25010000002 IOPAMIDOL 61 % SOLUTION: Performed by: FAMILY MEDICINE

## 2018-04-04 PROCEDURE — 74177 CT ABD & PELVIS W/CONTRAST: CPT

## 2018-04-04 RX ORDER — METHYLPREDNISOLONE SODIUM SUCCINATE 125 MG/2ML
125 INJECTION, POWDER, LYOPHILIZED, FOR SOLUTION INTRAMUSCULAR; INTRAVENOUS EVERY 6 HOURS
Status: DISCONTINUED | OUTPATIENT
Start: 2018-04-04 | End: 2018-06-19

## 2018-04-04 RX ADMIN — IOPAMIDOL 90 ML: 612 INJECTION, SOLUTION INTRAVENOUS at 17:15

## 2018-04-04 RX ADMIN — METHYLPREDNISOLONE SODIUM SUCCINATE 125 MG: 125 INJECTION, POWDER, LYOPHILIZED, FOR SOLUTION INTRAMUSCULAR; INTRAVENOUS at 17:01

## 2018-04-06 DIAGNOSIS — K76.9 LESION OF LIVER: Primary | ICD-10-CM

## 2018-04-09 RX ORDER — ROSUVASTATIN CALCIUM 5 MG/1
5 TABLET, COATED ORAL DAILY
Qty: 30 TABLET | Refills: 0 | Status: SHIPPED | OUTPATIENT
Start: 2018-04-09 | End: 2018-05-04 | Stop reason: SDUPTHER

## 2018-04-09 RX ORDER — EZETIMIBE 10 MG/1
TABLET ORAL
Qty: 30 TABLET | Refills: 0 | Status: SHIPPED | OUTPATIENT
Start: 2018-04-09 | End: 2018-05-04 | Stop reason: SDUPTHER

## 2018-04-10 ENCOUNTER — HOSPITAL ENCOUNTER (OUTPATIENT)
Dept: ULTRASOUND IMAGING | Facility: HOSPITAL | Age: 63
Discharge: HOME | End: 2018-04-10
Attending: FAMILY MEDICINE | Admitting: FAMILY MEDICINE

## 2018-04-10 PROCEDURE — 76705 ECHO EXAM OF ABDOMEN: CPT

## 2018-04-13 DIAGNOSIS — D12.6 TUBULAR ADENOMA OF COLON: Primary | ICD-10-CM

## 2018-04-19 RX ORDER — EMPAGLIFLOZIN AND METFORMIN HYDROCHLORIDE 12.5; 1 MG/1; MG/1
TABLET ORAL
Qty: 60 TABLET | Refills: 0 | OUTPATIENT
Start: 2018-04-19

## 2018-05-07 RX ORDER — EZETIMIBE 10 MG/1
TABLET ORAL
Qty: 30 TABLET | Refills: 0 | Status: SHIPPED | OUTPATIENT
Start: 2018-05-07 | End: 2018-06-07 | Stop reason: SDUPTHER

## 2018-05-07 RX ORDER — SITAGLIPTIN 100 MG/1
TABLET, FILM COATED ORAL
Qty: 30 TABLET | Refills: 0 | Status: SHIPPED | OUTPATIENT
Start: 2018-05-07 | End: 2018-06-07 | Stop reason: SDUPTHER

## 2018-05-07 RX ORDER — ROSUVASTATIN CALCIUM 5 MG/1
5 TABLET, COATED ORAL DAILY
Qty: 30 TABLET | Refills: 0 | Status: SHIPPED | OUTPATIENT
Start: 2018-05-07 | End: 2018-06-07 | Stop reason: SDUPTHER

## 2018-06-07 RX ORDER — SITAGLIPTIN 100 MG/1
TABLET, FILM COATED ORAL
Qty: 30 TABLET | Refills: 0 | Status: SHIPPED | OUTPATIENT
Start: 2018-06-07 | End: 2018-07-07 | Stop reason: SDUPTHER

## 2018-06-07 RX ORDER — ROSUVASTATIN CALCIUM 5 MG/1
5 TABLET, COATED ORAL DAILY
Qty: 30 TABLET | Refills: 0 | Status: SHIPPED | OUTPATIENT
Start: 2018-06-07 | End: 2018-06-19

## 2018-06-07 RX ORDER — EZETIMIBE 10 MG/1
TABLET ORAL
Qty: 30 TABLET | Refills: 0 | Status: SHIPPED | OUTPATIENT
Start: 2018-06-07 | End: 2018-07-07 | Stop reason: SDUPTHER

## 2018-06-14 ENCOUNTER — APPOINTMENT (OUTPATIENT)
Dept: LAB | Facility: HOSPITAL | Age: 63
End: 2018-06-14

## 2018-06-14 LAB
25(OH)D3 SERPL-MCNC: 43.4 NG/ML (ref 30–100)
ALBUMIN SERPL-MCNC: 4.2 G/DL (ref 3.4–4.8)
ALBUMIN UR-MCNC: 0.8 MG/L
ALBUMIN/GLOB SERPL: 1.7 G/DL (ref 1.1–1.8)
ALP SERPL-CCNC: 97 U/L (ref 38–126)
ALT SERPL W P-5'-P-CCNC: 29 U/L (ref 9–52)
ANION GAP SERPL CALCULATED.3IONS-SCNC: 9 MMOL/L (ref 5–15)
ARTICHOKE IGE QN: 68 MG/DL (ref 1–129)
AST SERPL-CCNC: 64 U/L (ref 14–36)
BASOPHILS # BLD AUTO: 0.03 10*3/MM3 (ref 0–0.2)
BASOPHILS NFR BLD AUTO: 0.6 % (ref 0–2)
BILIRUB SERPL-MCNC: 0.7 MG/DL (ref 0.2–1.3)
BUN BLD-MCNC: 12 MG/DL (ref 7–21)
BUN/CREAT SERPL: 29.3 (ref 7–25)
CALCIUM SPEC-SCNC: 9.1 MG/DL (ref 8.4–10.2)
CHLORIDE SERPL-SCNC: 104 MMOL/L (ref 95–110)
CHOLEST SERPL-MCNC: 141 MG/DL (ref 0–199)
CO2 SERPL-SCNC: 29 MMOL/L (ref 22–31)
CREAT BLD-MCNC: 0.41 MG/DL (ref 0.5–1)
CREAT UR-MCNC: 46.6 MG/DL
DEPRECATED RDW RBC AUTO: 44.7 FL (ref 36.4–46.3)
EOSINOPHIL # BLD AUTO: 0.14 10*3/MM3 (ref 0–0.7)
EOSINOPHIL NFR BLD AUTO: 2.6 % (ref 0–7)
ERYTHROCYTE [DISTWIDTH] IN BLOOD BY AUTOMATED COUNT: 13.8 % (ref 11.5–14.5)
GFR SERPL CREATININE-BSD FRML MDRD: 157 ML/MIN/1.73 (ref 45–104)
GLOBULIN UR ELPH-MCNC: 2.5 GM/DL (ref 2.3–3.5)
GLUCOSE BLD-MCNC: 158 MG/DL (ref 60–100)
HBA1C MFR BLD: 6.7 % (ref 4–5.6)
HCT VFR BLD AUTO: 41.4 % (ref 35–45)
HDLC SERPL-MCNC: 38 MG/DL (ref 60–200)
HGB BLD-MCNC: 13.9 G/DL (ref 12–15.5)
IMM GRANULOCYTES # BLD: 0.06 10*3/MM3 (ref 0–0.02)
IMM GRANULOCYTES NFR BLD: 1.1 % (ref 0–0.5)
LDLC/HDLC SERPL: 1.55 {RATIO} (ref 0–3.22)
LYMPHOCYTES # BLD AUTO: 1.52 10*3/MM3 (ref 0.6–4.2)
LYMPHOCYTES NFR BLD AUTO: 28.1 % (ref 10–50)
MCH RBC QN AUTO: 30 PG (ref 26.5–34)
MCHC RBC AUTO-ENTMCNC: 33.6 G/DL (ref 31.4–36)
MCV RBC AUTO: 89.4 FL (ref 80–98)
MICROALBUMIN/CREAT UR: 17.2 MG/G (ref 0–30)
MONOCYTES # BLD AUTO: 0.37 10*3/MM3 (ref 0–0.9)
MONOCYTES NFR BLD AUTO: 6.9 % (ref 0–12)
NEUTROPHILS # BLD AUTO: 3.28 10*3/MM3 (ref 2–8.6)
NEUTROPHILS NFR BLD AUTO: 60.7 % (ref 37–80)
PLATELET # BLD AUTO: 235 10*3/MM3 (ref 150–450)
PMV BLD AUTO: 10.8 FL (ref 8–12)
POTASSIUM BLD-SCNC: 4.7 MMOL/L (ref 3.5–5.1)
PROT SERPL-MCNC: 6.7 G/DL (ref 6.3–8.6)
RBC # BLD AUTO: 4.63 10*6/MM3 (ref 3.77–5.16)
SODIUM BLD-SCNC: 142 MMOL/L (ref 137–145)
TRIGL SERPL-MCNC: 221 MG/DL (ref 20–199)
TSH SERPL DL<=0.05 MIU/L-ACNC: 3.41 MIU/ML (ref 0.46–4.68)
VIT B12 BLD-MCNC: 248 PG/ML (ref 239–931)
WBC NRBC COR # BLD: 5.4 10*3/MM3 (ref 3.2–9.8)

## 2018-06-14 PROCEDURE — 82607 VITAMIN B-12: CPT | Performed by: INTERNAL MEDICINE

## 2018-06-14 PROCEDURE — 82043 UR ALBUMIN QUANTITATIVE: CPT | Performed by: INTERNAL MEDICINE

## 2018-06-14 PROCEDURE — 85025 COMPLETE CBC W/AUTO DIFF WBC: CPT | Performed by: INTERNAL MEDICINE

## 2018-06-14 PROCEDURE — 83036 HEMOGLOBIN GLYCOSYLATED A1C: CPT | Performed by: INTERNAL MEDICINE

## 2018-06-14 PROCEDURE — 36415 COLL VENOUS BLD VENIPUNCTURE: CPT | Performed by: INTERNAL MEDICINE

## 2018-06-14 PROCEDURE — 82570 ASSAY OF URINE CREATININE: CPT | Performed by: INTERNAL MEDICINE

## 2018-06-14 PROCEDURE — 80061 LIPID PANEL: CPT | Performed by: INTERNAL MEDICINE

## 2018-06-14 PROCEDURE — 82306 VITAMIN D 25 HYDROXY: CPT | Performed by: INTERNAL MEDICINE

## 2018-06-14 PROCEDURE — 84443 ASSAY THYROID STIM HORMONE: CPT | Performed by: INTERNAL MEDICINE

## 2018-06-14 PROCEDURE — 80053 COMPREHEN METABOLIC PANEL: CPT | Performed by: INTERNAL MEDICINE

## 2018-06-19 ENCOUNTER — OFFICE VISIT (OUTPATIENT)
Dept: ENDOCRINOLOGY | Facility: CLINIC | Age: 63
End: 2018-06-19

## 2018-06-19 VITALS
HEIGHT: 63 IN | WEIGHT: 159.9 LBS | SYSTOLIC BLOOD PRESSURE: 110 MMHG | BODY MASS INDEX: 28.33 KG/M2 | OXYGEN SATURATION: 96 % | DIASTOLIC BLOOD PRESSURE: 72 MMHG | HEART RATE: 95 BPM

## 2018-06-19 DIAGNOSIS — I25.10 CORONARY ARTERY DISEASE INVOLVING NATIVE CORONARY ARTERY OF NATIVE HEART, ANGINA PRESENCE UNSPECIFIED: ICD-10-CM

## 2018-06-19 DIAGNOSIS — E11.69 TYPE 2 DIABETES MELLITUS WITH DIABETIC CARDIOMYOPATHY (HCC): ICD-10-CM

## 2018-06-19 DIAGNOSIS — E78.2 MIXED DIABETIC HYPERLIPIDEMIA ASSOCIATED WITH TYPE 2 DIABETES MELLITUS (HCC): Primary | ICD-10-CM

## 2018-06-19 DIAGNOSIS — G57.12 MERALGIA PARAESTHETICA, LEFT: ICD-10-CM

## 2018-06-19 DIAGNOSIS — E55.9 VITAMIN D DEFICIENCY: ICD-10-CM

## 2018-06-19 DIAGNOSIS — E11.69 MIXED DIABETIC HYPERLIPIDEMIA ASSOCIATED WITH TYPE 2 DIABETES MELLITUS (HCC): Primary | ICD-10-CM

## 2018-06-19 DIAGNOSIS — I43 TYPE 2 DIABETES MELLITUS WITH DIABETIC CARDIOMYOPATHY (HCC): ICD-10-CM

## 2018-06-19 DIAGNOSIS — E53.8 B12 DEFICIENCY: ICD-10-CM

## 2018-06-19 DIAGNOSIS — E11.59 HYPERTENSION ASSOCIATED WITH DIABETES (HCC): ICD-10-CM

## 2018-06-19 DIAGNOSIS — I15.2 HYPERTENSION ASSOCIATED WITH DIABETES (HCC): ICD-10-CM

## 2018-06-19 LAB — GLUCOSE BLDC GLUCOMTR-MCNC: 188 MG/DL (ref 70–130)

## 2018-06-19 PROCEDURE — 82962 GLUCOSE BLOOD TEST: CPT | Performed by: INTERNAL MEDICINE

## 2018-06-19 PROCEDURE — 99214 OFFICE O/P EST MOD 30 MIN: CPT | Performed by: INTERNAL MEDICINE

## 2018-06-19 NOTE — PROGRESS NOTES
Bonnie Garcia is a 63 y.o. female who presents for  evaluation of   Chief Complaint   Patient presents with   • Diabetes     188         Primary Care / Referring Provider  Yudi Quiñones MD      Duration/Timing:  Diabetes mellitus type 2, Age at onset of diabetes: 58 years, Onset of symptoms gradual  Timing - constant  Quality - controlled  Severity - high since associated CAD    alleviating factors - degree of understanding/education    Severity (Complications/Hospitalizations)  Secondary Macrovascular Complications:  CAD, No CVA, No PAD  has 50 to 50% LAD     PTCI , stent x 5    2 in proximal LAD    1 in circumflex    may need heart surgery   Secondary Microvascular Complications:  No Diabetic Nephropathy, No Diabetic Retinopathy, No Diabetic Neuropathy    Context  Diabetes Regimen:  Oral Medications  Blood Glucose Readings  at goal  Diet  variable carb intake  Exercise:  Does not exercise    Associated Signs/Symptoms  Hyperglycemic Symptoms:  No polyuria, No polydipsia, No polyphagia, No blurred vision, Weight gain, Weight loss/gain amount 12 lbs lbs since last appointment  Hypoglycemic Episodes:  No documented hypoglycemia  Also h o thyroid nodule  location - right  timing constant  not progressive  severity - low -  bx , Sept 2008 - benign   biochemically euthyroid , off synthroid but used to be on 25 mcgs daily     thyroid US personally reviewed    Sept 2008    1.8 x 1.7 x 1.4 , right , solid , heterogeneous, hypoechoic.     Repeat US June 2013    r solid nodule stable in size, echogenicity consistent w Hashimoto's       Past Medical History:   Diagnosis Date   • Chicken pox    • Coronary arteriosclerosis    • Diabetes mellitus     TYPE 2   • Diastasis of muscle    • Fibrocystic breast    • GERD (gastroesophageal reflux disease)    • H/O Hashimoto thyroiditis    • History of echocardiogram 01/01/2007    ef 35-40% diastolic dysfunction   • History of mammogram 04/13/2015   • History of myocardial  infarction    • History of Papanicolaou smear of cervix 2004    negative   • Hyperlipidemia    • Hypertension    • Measles    • Mild concentric left ventricular hypertrophy (LVH)    • Thyroid nodule    • Vitamin D deficiency      Family History   Problem Relation Age of Onset   • Diabetes Mother    • Coronary artery disease Mother    • Heart attack Father          at 53 multiple mi's   • Coronary artery disease Other    • Diabetes Other    • Thyroid disease Other    • Hypothyroidism Daughter      Social History   Substance Use Topics   • Smoking status: Former Smoker     Types: Cigarettes   • Smokeless tobacco: Never Used      Comment: smoked for 5 years   • Alcohol use No         Current Outpatient Prescriptions:   •  aspirin 325 MG tablet, Take 325 mg by mouth Daily., Disp: , Rfl:   •  carvedilol CR (COREG CR) 20 MG 24 hr capsule, Take 20 mg by mouth Daily., Disp: , Rfl:   •  Empagliflozin-Metformin HCl (SYNJARDY) 12.5-1000 MG tablet, Take 1 tablet by mouth 2 (Two) Times a Day With Meals., Disp: 60 tablet, Rfl: 11  •  Evolocumab (REPATHA SURECLICK) 140 MG/ML solution auto-injector, Inject 140 mg under the skin Every 14 (Fourteen) Days., Disp: 2 pen, Rfl: 11  •  ezetimibe (ZETIA) 10 MG tablet, TAKE 1 TABLET BY MOUTH DAILY, Disp: 30 tablet, Rfl: 0  •  glucose blood test strip, 1 each by Other route As Needed. Use as instructed, Disp: , Rfl:   •  icosapent ethyl (VASCEPA) 1 G capsule capsule, Take 2 g by mouth 2 (Two) Times a Day With Meals., Disp: , Rfl:   •  JANUVIA 100 MG tablet, TAKE 1 TABLET BY MOUTH DAILY BEFORE BREAKFAST, Disp: 30 tablet, Rfl: 0  •  Lorcaserin HCl (BELVIQ) 10 MG tablet, One tab po bid, Disp: 60 tablet, Rfl: 5  •  losartan (COZAAR) 25 MG tablet, 1 tablet., Disp: , Rfl: 0  •  meclizine (ANTIVERT) 25 MG tablet, Take 1 tablet by mouth 4 (Four) Times a Day As Needed for dizziness., Disp: 25 tablet, Rfl: 0  •  ondansetron (ZOFRAN) 4 MG tablet, Take 1 tablet by mouth Every 8 (Eight)  Hours As Needed for Nausea or Vomiting., Disp: 12 tablet, Rfl: 0  •  prasugrel (EFFIENT) 10 MG tablet, Take 10 mg by mouth Daily., Disp: , Rfl:   •  predniSONE (DELTASONE) 10 MG tablet, 50 mg po 13 hours before CT scan, 7 hours before and 1 hour before contrast along with 50 mg of Benadryl 1 hour before dye, Disp: 15 tablet, Rfl: 0  •  ranolazine (RANEXA) 500 MG 12 hr tablet, Take 500 mg by mouth 2 (Two) Times a Day., Disp: , Rfl:   •  rosuvastatin (CRESTOR) 5 MG tablet, Take 1 tablet by mouth Daily., Disp: 30 tablet, Rfl: 5  •  rosuvastatin (CRESTOR) 5 MG tablet, TAKE 1 TABLET BY MOUTH DAILY, Disp: 30 tablet, Rfl: 0  •  vitamin D (ERGOCALCIFEROL) 67793 units capsule capsule, Take 1 capsule by mouth Every 7 (Seven) Days., Disp: 12 capsule, Rfl: 3  No current facility-administered medications for this visit.     Review of Systems    Review of Systems   Constitutional: Negative for activity change, appetite change, chills, diaphoresis, fatigue, fever and unexpected weight change.   HENT: Negative for congestion, dental problem, drooling, ear discharge, ear pain, facial swelling, mouth sores, postnasal drip, rhinorrhea, sinus pressure, sore throat, tinnitus, trouble swallowing and voice change.    Eyes: Negative for photophobia, pain, discharge, redness, itching and visual disturbance.   Respiratory: Negative for apnea, cough, choking, chest tightness, shortness of breath, wheezing and stridor.    Cardiovascular: Negative for chest pain, palpitations and leg swelling.   Gastrointestinal: Negative for abdominal distention, abdominal pain, constipation, diarrhea, nausea and vomiting.   Endocrine: Negative for cold intolerance, heat intolerance, polydipsia, polyphagia and polyuria.   Genitourinary: Negative for decreased urine volume, difficulty urinating, dysuria, flank pain, frequency, hematuria and urgency.   Musculoskeletal: Positive for myalgias. Negative for arthralgias, back pain, gait problem, joint swelling, neck  "pain and neck stiffness.   Skin: Negative for color change, pallor, rash and wound.   Allergic/Immunologic: Negative for immunocompromised state.   Neurological: Negative for dizziness, tremors, seizures, syncope, facial asymmetry, speech difficulty, weakness, light-headedness, numbness and headaches.   Hematological: Negative for adenopathy.   Psychiatric/Behavioral: Negative for agitation, behavioral problems, confusion, decreased concentration, dysphoric mood, hallucinations, self-injury, sleep disturbance and suicidal ideas. The patient is not nervous/anxious and is not hyperactive.         Objective:     /72 (BP Location: Left arm, Patient Position: Sitting, Cuff Size: Large Adult)   Pulse 95   Ht 160 cm (63\")   Wt 72.5 kg (159 lb 14.4 oz)   SpO2 96%   BMI 28.33 kg/m²     Physical Exam   Constitutional: She is oriented to person, place, and time. She appears well-developed.   HENT:   Head: Normocephalic.   Right Ear: External ear normal.   Left Ear: External ear normal.   Nose: Nose normal.   Eyes: Conjunctivae and EOM are normal. No scleral icterus.   Neck: Normal range of motion. Neck supple. No tracheal deviation present. No thyromegaly present.   Cardiovascular: Normal rate, regular rhythm, normal heart sounds and intact distal pulses.  Exam reveals no gallop and no friction rub.    No murmur heard.  Pulmonary/Chest: Effort normal and breath sounds normal. No stridor. No respiratory distress. She has no wheezes. She has no rales. She exhibits no tenderness.   Abdominal: Soft. Bowel sounds are normal. She exhibits no distension and no mass. There is no tenderness. There is no rebound and no guarding.   Musculoskeletal: Normal range of motion. She exhibits no tenderness or deformity.   Lymphadenopathy:     She has no cervical adenopathy.   Neurological: She is alert and oriented to person, place, and time. She displays normal reflexes. She exhibits normal muscle tone. Coordination normal.   Skin: " No rash noted. No erythema. No pallor.   Psychiatric: She has a normal mood and affect. Her behavior is normal. Judgment and thought content normal.       Lab Review    Results for orders placed or performed in visit on 06/19/18   POC Glucose   Result Value Ref Range    Glucose 188 (A) 70 - 130 mg/dL           Assessment/Plan       ICD-10-CM ICD-9-CM   1. Mixed diabetic hyperlipidemia associated with type 2 diabetes mellitus E11.69 250.80    E78.2 272.2   2. Hypertension associated with diabetes E11.59 250.80    I10 401.9   3. Type 2 diabetes mellitus with diabetic cardiomyopathy E11.69 250.80    I43 425.7   4. Vitamin D deficiency E55.9 268.9   5. B12 deficiency E53.8 266.2   6. Coronary artery disease involving native coronary artery of native heart, angina presence unspecified I25.10 414.01   7. Meralgia paraesthetica, left G57.12 355.1         Glycemic Management:      Lab Results   Component Value Date    HGBA1C 6.7 (H) 06/14/2018    HGBA1C 6.8 (H) 01/11/2018    HGBA1C 7.16 (H) 05/31/2017     Lab Results   Component Value Date    MICROALBUR 0.8 06/14/2018    CREATININE 0.41 (L) 06/14/2018         Diabetes ----    On synjary and januvia    Afraid of glp1 due to pancreatic cancer concern       Lipid Management  Lab Results   Component Value Date    CHOL 141 06/14/2018    CHOL 138 01/11/2018    CHOL 135 05/31/2017     Lab Results   Component Value Date    TRIG 221 (H) 06/14/2018    TRIG 185 01/11/2018    TRIG 235 (H) 05/31/2017     Lab Results   Component Value Date    HDL 38 (L) 06/14/2018    HDL 39 (L) 01/11/2018    HDL 41 (L) 05/31/2017       Lab Results   Component Value Date    LDL 68 06/14/2018    LDL 80 01/11/2018    LDL 66 05/31/2017         LDL elevated    can't handle tricor    on vascepa ( intolerant to lovaza )    niaspan 500 mg ER  taking - not approved and not needed     Tried  crestor 5 mg but developed myalgias in 2015 but able to tolerate on the off days    has tried lipitor, zocor, livalo,  fluvastatin since 2010 but hasn't been able to tolerate due to myalgias     Presently only on zetia 10 mg daily     repatha not paid for and now LDL 68       Blood Pressure Management:  Target blood pressure less than 130/80 mm/hg, Not on Ace Inhibitor/ARB, Control of blood pressure  on coreg cr 20 mg daily and  losartan 25 mg   was on benicar ,  off ACEi due to cough    edema left > r , use compression stockings  Microvascular Complication Monitoring:  No Microalbuminuria, Date of last Microalbumin Assessment 10/08/2015, No Diabetic Retinopathy, No Diabetic Neuropathy  last eye exam 2015 no DR    +  neuropathy   Meralgia paresthetica  Start neurontin 300 mg tid had to back off to 100 mg po bid - can't tolerate   no nephropathy    mild microalbuminuria  Immunizations:  Last influenza immunization 2015, Patient prefers not to receive pneumococcal immunization  Preventive Care:  Patient is not smoking  Weight Related  has gained 12 lbs but will lose it     Add contrave to lifestyle changes - felt she was going to die    Add belviq, side effects      Bone Health  Vit D 50 th weekly and levels are nl   Other Diabetes Related Aspects  Right Thyroid nodule      R nodule , dimensions in HPI , stable from 2008 to 2013     she has Hashimoto's proven on US and + TPO ab, euthyroid June 2013  used to be on Lt4 at 25 mcgs but d/c years ago  follow TSH - nl     Lab Results   Component Value Date    TSH 3.410 06/14/2018         --    10-15  nl B12 but low normal, start b12     having some unsteadiness and numbness      I reviewed and summarized records from Yudi Quiñones MD from 2017 and I reviewed / ordered labs.     Orders Placed This Encounter   Procedures   • POC Glucose         A copy of my note was sent to Yudi Quiñones MD    Please see my above opinion and suggestions.

## 2018-07-09 RX ORDER — SITAGLIPTIN 100 MG/1
TABLET, FILM COATED ORAL
Qty: 30 TABLET | Refills: 0 | Status: SHIPPED | OUTPATIENT
Start: 2018-07-09 | End: 2018-07-09

## 2018-07-09 RX ORDER — EZETIMIBE 10 MG/1
TABLET ORAL
Qty: 30 TABLET | Refills: 0 | Status: SHIPPED | OUTPATIENT
Start: 2018-07-09 | End: 2018-07-09

## 2018-07-09 RX ORDER — ROSUVASTATIN CALCIUM 5 MG/1
5 TABLET, COATED ORAL DAILY
Qty: 30 TABLET | Refills: 0 | Status: SHIPPED | OUTPATIENT
Start: 2018-07-09 | End: 2018-08-05 | Stop reason: SDUPTHER

## 2018-07-09 RX ORDER — EZETIMIBE 10 MG/1
10 TABLET ORAL DAILY
COMMUNITY
End: 2018-12-18

## 2018-07-12 ENCOUNTER — ANESTHESIA (OUTPATIENT)
Dept: GASTROENTEROLOGY | Facility: HOSPITAL | Age: 63
End: 2018-07-12

## 2018-07-12 ENCOUNTER — ANESTHESIA EVENT (OUTPATIENT)
Dept: GASTROENTEROLOGY | Facility: HOSPITAL | Age: 63
End: 2018-07-12

## 2018-07-12 ENCOUNTER — HOSPITAL ENCOUNTER (OUTPATIENT)
Facility: HOSPITAL | Age: 63
Setting detail: HOSPITAL OUTPATIENT SURGERY
Discharge: HOME | End: 2018-07-12
Attending: INTERNAL MEDICINE | Admitting: INTERNAL MEDICINE

## 2018-07-12 VITALS
WEIGHT: 154.1 LBS | OXYGEN SATURATION: 94 % | BODY MASS INDEX: 27.3 KG/M2 | SYSTOLIC BLOOD PRESSURE: 113 MMHG | TEMPERATURE: 97.6 F | HEART RATE: 74 BPM | DIASTOLIC BLOOD PRESSURE: 69 MMHG | RESPIRATION RATE: 16 BRPM | HEIGHT: 63 IN

## 2018-07-12 DIAGNOSIS — R93.2 ABNORMAL FINDINGS ON IMAGING OF BILIARY TRACT: ICD-10-CM

## 2018-07-12 DIAGNOSIS — Z86.010 HISTORY OF COLONIC POLYPS: ICD-10-CM

## 2018-07-12 LAB — GLUCOSE BLDC GLUCOMTR-MCNC: 211 MG/DL (ref 70–130)

## 2018-07-12 PROCEDURE — 88305 TISSUE EXAM BY PATHOLOGIST: CPT | Performed by: PATHOLOGY

## 2018-07-12 PROCEDURE — 88305 TISSUE EXAM BY PATHOLOGIST: CPT | Performed by: INTERNAL MEDICINE

## 2018-07-12 PROCEDURE — 25010000002 PROPOFOL 10 MG/ML EMULSION: Performed by: NURSE ANESTHETIST, CERTIFIED REGISTERED

## 2018-07-12 PROCEDURE — 82962 GLUCOSE BLOOD TEST: CPT

## 2018-07-12 RX ORDER — PROMETHAZINE HYDROCHLORIDE 25 MG/ML
12.5 INJECTION, SOLUTION INTRAMUSCULAR; INTRAVENOUS ONCE AS NEEDED
Status: DISCONTINUED | OUTPATIENT
Start: 2018-07-12 | End: 2018-07-12 | Stop reason: HOSPADM

## 2018-07-12 RX ORDER — LIDOCAINE HYDROCHLORIDE 10 MG/ML
INJECTION, SOLUTION INFILTRATION; PERINEURAL AS NEEDED
Status: DISCONTINUED | OUTPATIENT
Start: 2018-07-12 | End: 2018-07-12 | Stop reason: SURG

## 2018-07-12 RX ORDER — PROPOFOL 10 MG/ML
VIAL (ML) INTRAVENOUS AS NEEDED
Status: DISCONTINUED | OUTPATIENT
Start: 2018-07-12 | End: 2018-07-12 | Stop reason: SURG

## 2018-07-12 RX ORDER — SODIUM CHLORIDE 9 MG/ML
30 INJECTION, SOLUTION INTRAVENOUS CONTINUOUS
Status: DISCONTINUED | OUTPATIENT
Start: 2018-07-12 | End: 2018-07-12 | Stop reason: HOSPADM

## 2018-07-12 RX ORDER — ONDANSETRON 2 MG/ML
4 INJECTION INTRAMUSCULAR; INTRAVENOUS ONCE AS NEEDED
Status: DISCONTINUED | OUTPATIENT
Start: 2018-07-12 | End: 2018-07-12 | Stop reason: HOSPADM

## 2018-07-12 RX ORDER — PROMETHAZINE HYDROCHLORIDE 25 MG/1
25 TABLET ORAL ONCE AS NEEDED
Status: DISCONTINUED | OUTPATIENT
Start: 2018-07-12 | End: 2018-07-12 | Stop reason: HOSPADM

## 2018-07-12 RX ORDER — DEXTROSE AND SODIUM CHLORIDE 5; .45 G/100ML; G/100ML
20 INJECTION, SOLUTION INTRAVENOUS CONTINUOUS
Status: DISCONTINUED | OUTPATIENT
Start: 2018-07-12 | End: 2018-07-12 | Stop reason: HOSPADM

## 2018-07-12 RX ORDER — PROMETHAZINE HYDROCHLORIDE 25 MG/1
25 SUPPOSITORY RECTAL ONCE AS NEEDED
Status: DISCONTINUED | OUTPATIENT
Start: 2018-07-12 | End: 2018-07-12 | Stop reason: HOSPADM

## 2018-07-12 RX ADMIN — PROPOFOL 30 MG: 10 INJECTION, EMULSION INTRAVENOUS at 12:14

## 2018-07-12 RX ADMIN — PROPOFOL 90 MG: 10 INJECTION, EMULSION INTRAVENOUS at 12:06

## 2018-07-12 RX ADMIN — LIDOCAINE HYDROCHLORIDE 70 MG: 10 INJECTION, SOLUTION INFILTRATION; PERINEURAL at 12:06

## 2018-07-12 RX ADMIN — SODIUM CHLORIDE 30 ML/HR: 9 INJECTION, SOLUTION INTRAVENOUS at 10:41

## 2018-07-12 RX ADMIN — PROPOFOL 30 MG: 10 INJECTION, EMULSION INTRAVENOUS at 12:17

## 2018-07-12 RX ADMIN — SODIUM CHLORIDE 30 ML/HR: 9 INJECTION, SOLUTION INTRAVENOUS at 10:43

## 2018-07-12 RX ADMIN — PROPOFOL 30 MG: 10 INJECTION, EMULSION INTRAVENOUS at 12:20

## 2018-07-12 RX ADMIN — PROPOFOL 20 MG: 10 INJECTION, EMULSION INTRAVENOUS at 12:16

## 2018-07-12 RX ADMIN — PROPOFOL 20 MG: 10 INJECTION, EMULSION INTRAVENOUS at 12:19

## 2018-07-12 RX ADMIN — PROPOFOL 30 MG: 10 INJECTION, EMULSION INTRAVENOUS at 12:11

## 2018-07-12 RX ADMIN — PROPOFOL 20 MG: 10 INJECTION, EMULSION INTRAVENOUS at 12:21

## 2018-07-12 RX ADMIN — PROPOFOL 30 MG: 10 INJECTION, EMULSION INTRAVENOUS at 12:18

## 2018-07-12 NOTE — ANESTHESIA POSTPROCEDURE EVALUATION
Patient: Bonnie Garcia    Procedure Summary     Date:  07/12/18 Room / Location:  [facility] ENDOSCOPY 2 / [facility] ENDOSCOPY    Anesthesia Start:  1157 Anesthesia Stop:  1228    Procedures:       ESOPHAGOGASTRODUODENOSCOPY (N/A )      COLONOSCOPY (N/A ) Diagnosis:       Abnormal findings on imaging of biliary tract      History of colonic polyps      (Abnormal findings on imaging of biliary tract [R93.2])      (History of colonic polyps [Z86.010])    Surgeon:  Juan iPerre DO Provider:  Davina Perez CRNA    Anesthesia Type:  MAC ASA Status:  3          Anesthesia Type: MAC  Last vitals  BP   143/85 (07/12/18 1023)   Temp   97.4 °F (36.3 °C) (07/12/18 1023)   Pulse   78 (07/12/18 1023)   Resp   18 (07/12/18 1023)     SpO2   97 % (07/12/18 1023)     Post Anesthesia Care and Evaluation    Patient location during evaluation: bedside  Patient participation: complete - patient participated  Level of consciousness: responsive to verbal stimuli  Pain management: adequate  Airway patency: patent  Anesthetic complications: No anesthetic complications    Cardiovascular status: acceptable  Respiratory status: acceptable, nonlabored ventilation, room air and spontaneous ventilation  Hydration status: acceptable

## 2018-07-12 NOTE — DISCHARGE INSTR - APPOINTMENTS
Dr. Juan Pierre, DO  [address], [address]  [place] [address]  [phone]    Appointment date and time:  January 11th, 2018 at 9:15 am

## 2018-07-13 LAB
LAB AP CASE REPORT: NORMAL
PATH REPORT.FINAL DX SPEC: NORMAL
PATH REPORT.GROSS SPEC: NORMAL

## 2018-08-06 RX ORDER — ROSUVASTATIN CALCIUM 5 MG/1
5 TABLET, COATED ORAL DAILY
Qty: 30 TABLET | Refills: 0 | Status: SHIPPED | OUTPATIENT
Start: 2018-08-06 | End: 2018-09-01 | Stop reason: SDUPTHER

## 2018-08-06 RX ORDER — EZETIMIBE 10 MG/1
TABLET ORAL
Qty: 30 TABLET | Refills: 0 | Status: SHIPPED | OUTPATIENT
Start: 2018-08-06 | End: 2018-09-01 | Stop reason: SDUPTHER

## 2018-08-06 RX ORDER — SITAGLIPTIN 100 MG/1
TABLET, FILM COATED ORAL
Qty: 30 TABLET | Refills: 0 | Status: SHIPPED | OUTPATIENT
Start: 2018-08-06 | End: 2018-09-01 | Stop reason: SDUPTHER

## 2018-09-04 RX ORDER — EZETIMIBE 10 MG/1
TABLET ORAL
Qty: 30 TABLET | Refills: 0 | Status: SHIPPED | OUTPATIENT
Start: 2018-09-04 | End: 2018-09-18

## 2018-09-04 RX ORDER — SITAGLIPTIN 100 MG/1
TABLET, FILM COATED ORAL
Qty: 30 TABLET | Refills: 0 | Status: SHIPPED | OUTPATIENT
Start: 2018-09-04 | End: 2018-10-01 | Stop reason: SDUPTHER

## 2018-09-04 RX ORDER — ROSUVASTATIN CALCIUM 5 MG/1
5 TABLET, COATED ORAL DAILY
Qty: 30 TABLET | Refills: 0 | Status: SHIPPED | OUTPATIENT
Start: 2018-09-04 | End: 2018-10-01 | Stop reason: SDUPTHER

## 2018-09-18 ENCOUNTER — HOSPITAL ENCOUNTER (OUTPATIENT)
Facility: HOSPITAL | Age: 63
Setting detail: OBSERVATION
Discharge: HOME | End: 2018-09-19
Attending: EMERGENCY MEDICINE | Admitting: STUDENT IN AN ORGANIZED HEALTH CARE EDUCATION/TRAINING PROGRAM

## 2018-09-18 ENCOUNTER — APPOINTMENT (OUTPATIENT)
Dept: GENERAL RADIOLOGY | Facility: HOSPITAL | Age: 63
End: 2018-09-18

## 2018-09-18 ENCOUNTER — APPOINTMENT (OUTPATIENT)
Dept: CT IMAGING | Facility: HOSPITAL | Age: 63
End: 2018-09-18

## 2018-09-18 DIAGNOSIS — G45.9 TRANSIENT CEREBRAL ISCHEMIA, UNSPECIFIED TYPE: Primary | ICD-10-CM

## 2018-09-18 DIAGNOSIS — R53.1 WEAKNESS: ICD-10-CM

## 2018-09-18 PROBLEM — R42 DIZZINESS: Status: ACTIVE | Noted: 2018-09-18

## 2018-09-18 LAB
ABO GROUP BLD: NORMAL
ALBUMIN SERPL-MCNC: 4.2 G/DL (ref 3.4–4.8)
ALBUMIN/GLOB SERPL: 1.7 G/DL (ref 1.1–1.8)
ALP SERPL-CCNC: 113 U/L (ref 38–126)
ALT SERPL W P-5'-P-CCNC: 27 U/L (ref 9–52)
ANION GAP SERPL CALCULATED.3IONS-SCNC: 15 MMOL/L (ref 5–15)
APTT PPP: 24.5 SECONDS (ref 20–40.3)
ARTICHOKE IGE QN: 49 MG/DL (ref 1–129)
AST SERPL-CCNC: 25 U/L (ref 14–36)
BASOPHILS # BLD AUTO: 0.03 10*3/MM3 (ref 0–0.2)
BASOPHILS NFR BLD AUTO: 0.5 % (ref 0–2)
BILIRUB SERPL-MCNC: 0.7 MG/DL (ref 0.2–1.3)
BLD GP AB SCN SERPL QL: NEGATIVE
BUN BLD-MCNC: 13 MG/DL (ref 7–21)
BUN/CREAT SERPL: 28.9 (ref 7–25)
CALCIUM SPEC-SCNC: 9 MG/DL (ref 8.4–10.2)
CHLORIDE SERPL-SCNC: 101 MMOL/L (ref 95–110)
CHOLEST SERPL-MCNC: 124 MG/DL (ref 0–199)
CO2 SERPL-SCNC: 21 MMOL/L (ref 22–31)
CREAT BLD-MCNC: 0.45 MG/DL (ref 0.5–1)
DEPRECATED RDW RBC AUTO: 44.1 FL (ref 36.4–46.3)
EOSINOPHIL # BLD AUTO: 0.15 10*3/MM3 (ref 0–0.7)
EOSINOPHIL NFR BLD AUTO: 2.5 % (ref 0–7)
ERYTHROCYTE [DISTWIDTH] IN BLOOD BY AUTOMATED COUNT: 13.4 % (ref 11.5–14.5)
GFR SERPL CREATININE-BSD FRML MDRD: 141 ML/MIN/1.73 (ref 45–104)
GLOBULIN UR ELPH-MCNC: 2.5 GM/DL (ref 2.3–3.5)
GLUCOSE BLD-MCNC: 158 MG/DL (ref 60–100)
GLUCOSE BLDC GLUCOMTR-MCNC: 164 MG/DL (ref 70–130)
HCT VFR BLD AUTO: 39.8 % (ref 35–45)
HDLC SERPL-MCNC: 38 MG/DL (ref 60–200)
HGB BLD-MCNC: 13.6 G/DL (ref 12–15.5)
HOLD SPECIMEN: NORMAL
HOLD SPECIMEN: NORMAL
IMM GRANULOCYTES # BLD: 0.07 10*3/MM3 (ref 0–0.02)
IMM GRANULOCYTES NFR BLD: 1.2 % (ref 0–0.5)
INR PPP: 0.91 (ref 0.8–1.2)
LDLC/HDLC SERPL: 0.23 {RATIO} (ref 0–3.22)
LYMPHOCYTES # BLD AUTO: 1.48 10*3/MM3 (ref 0.6–4.2)
LYMPHOCYTES NFR BLD AUTO: 24.9 % (ref 10–50)
Lab: NORMAL
MCH RBC QN AUTO: 30.9 PG (ref 26.5–34)
MCHC RBC AUTO-ENTMCNC: 34.2 G/DL (ref 31.4–36)
MCV RBC AUTO: 90.5 FL (ref 80–98)
MONOCYTES # BLD AUTO: 0.49 10*3/MM3 (ref 0–0.9)
MONOCYTES NFR BLD AUTO: 8.2 % (ref 0–12)
NEUTROPHILS # BLD AUTO: 3.73 10*3/MM3 (ref 2–8.6)
NEUTROPHILS NFR BLD AUTO: 62.7 % (ref 37–80)
PLATELET # BLD AUTO: 206 10*3/MM3 (ref 150–450)
PMV BLD AUTO: 10.6 FL (ref 8–12)
POTASSIUM BLD-SCNC: 4.3 MMOL/L (ref 3.5–5.1)
PROT SERPL-MCNC: 6.7 G/DL (ref 6.3–8.6)
PROTHROMBIN TIME: 12.1 SECONDS (ref 11.1–15.3)
RBC # BLD AUTO: 4.4 10*6/MM3 (ref 3.77–5.16)
RH BLD: NEGATIVE
SODIUM BLD-SCNC: 137 MMOL/L (ref 137–145)
T&S EXPIRATION DATE: NORMAL
T4 FREE SERPL-MCNC: 1.06 NG/DL (ref 0.78–2.19)
TRIGL SERPL-MCNC: 387 MG/DL (ref 20–199)
TROPONIN I SERPL-MCNC: <0.012 NG/ML
TSH SERPL DL<=0.05 MIU/L-ACNC: 1.86 MIU/ML (ref 0.46–4.68)
WBC NRBC COR # BLD: 5.95 10*3/MM3 (ref 3.2–9.8)
WHOLE BLOOD HOLD SPECIMEN: NORMAL
WHOLE BLOOD HOLD SPECIMEN: NORMAL

## 2018-09-18 PROCEDURE — 96360 HYDRATION IV INFUSION INIT: CPT

## 2018-09-18 PROCEDURE — 80061 LIPID PANEL: CPT | Performed by: FAMILY MEDICINE

## 2018-09-18 PROCEDURE — 80053 COMPREHEN METABOLIC PANEL: CPT | Performed by: EMERGENCY MEDICINE

## 2018-09-18 PROCEDURE — G0378 HOSPITAL OBSERVATION PER HR: HCPCS

## 2018-09-18 PROCEDURE — 86900 BLOOD TYPING SEROLOGIC ABO: CPT | Performed by: EMERGENCY MEDICINE

## 2018-09-18 PROCEDURE — 85730 THROMBOPLASTIN TIME PARTIAL: CPT | Performed by: EMERGENCY MEDICINE

## 2018-09-18 PROCEDURE — 70450 CT HEAD/BRAIN W/O DYE: CPT

## 2018-09-18 PROCEDURE — 93010 ELECTROCARDIOGRAM REPORT: CPT | Performed by: INTERNAL MEDICINE

## 2018-09-18 PROCEDURE — 86901 BLOOD TYPING SEROLOGIC RH(D): CPT | Performed by: EMERGENCY MEDICINE

## 2018-09-18 PROCEDURE — 71045 X-RAY EXAM CHEST 1 VIEW: CPT

## 2018-09-18 PROCEDURE — 86850 RBC ANTIBODY SCREEN: CPT | Performed by: EMERGENCY MEDICINE

## 2018-09-18 PROCEDURE — 93005 ELECTROCARDIOGRAM TRACING: CPT | Performed by: EMERGENCY MEDICINE

## 2018-09-18 PROCEDURE — 82962 GLUCOSE BLOOD TEST: CPT

## 2018-09-18 PROCEDURE — 84439 ASSAY OF FREE THYROXINE: CPT | Performed by: EMERGENCY MEDICINE

## 2018-09-18 PROCEDURE — 85610 PROTHROMBIN TIME: CPT | Performed by: EMERGENCY MEDICINE

## 2018-09-18 PROCEDURE — 84443 ASSAY THYROID STIM HORMONE: CPT | Performed by: EMERGENCY MEDICINE

## 2018-09-18 PROCEDURE — 99284 EMERGENCY DEPT VISIT MOD MDM: CPT

## 2018-09-18 PROCEDURE — 85025 COMPLETE CBC W/AUTO DIFF WBC: CPT | Performed by: EMERGENCY MEDICINE

## 2018-09-18 PROCEDURE — 84484 ASSAY OF TROPONIN QUANT: CPT | Performed by: EMERGENCY MEDICINE

## 2018-09-18 RX ORDER — NICOTINE POLACRILEX 4 MG
15 LOZENGE BUCCAL
Status: DISCONTINUED | OUTPATIENT
Start: 2018-09-18 | End: 2018-09-19 | Stop reason: HOSPADM

## 2018-09-18 RX ORDER — PRASUGREL 10 MG/1
10 TABLET, FILM COATED ORAL DAILY
Status: DISCONTINUED | OUTPATIENT
Start: 2018-09-19 | End: 2018-09-19 | Stop reason: HOSPADM

## 2018-09-18 RX ORDER — MECLIZINE HCL 12.5 MG/1
12.5 TABLET ORAL 3 TIMES DAILY PRN
Status: DISCONTINUED | OUTPATIENT
Start: 2018-09-18 | End: 2018-09-19 | Stop reason: HOSPADM

## 2018-09-18 RX ORDER — ROSUVASTATIN CALCIUM 5 MG/1
5 TABLET, COATED ORAL DAILY
Status: DISCONTINUED | OUTPATIENT
Start: 2018-09-19 | End: 2018-09-19 | Stop reason: HOSPADM

## 2018-09-18 RX ORDER — ACETAMINOPHEN 325 MG/1
650 TABLET ORAL EVERY 4 HOURS PRN
Status: DISCONTINUED | OUTPATIENT
Start: 2018-09-18 | End: 2018-09-19 | Stop reason: HOSPADM

## 2018-09-18 RX ORDER — ONDANSETRON 2 MG/ML
4 INJECTION INTRAMUSCULAR; INTRAVENOUS EVERY 6 HOURS PRN
Status: DISCONTINUED | OUTPATIENT
Start: 2018-09-18 | End: 2018-09-19 | Stop reason: HOSPADM

## 2018-09-18 RX ORDER — CARVEDILOL 6.25 MG/1
6.25 TABLET ORAL EVERY 12 HOURS SCHEDULED
Status: DISCONTINUED | OUTPATIENT
Start: 2018-09-18 | End: 2018-09-19 | Stop reason: HOSPADM

## 2018-09-18 RX ORDER — DOCUSATE SODIUM 100 MG/1
100 CAPSULE, LIQUID FILLED ORAL 2 TIMES DAILY PRN
Status: DISCONTINUED | OUTPATIENT
Start: 2018-09-18 | End: 2018-09-19 | Stop reason: HOSPADM

## 2018-09-18 RX ORDER — LOSARTAN POTASSIUM 25 MG/1
25 TABLET ORAL DAILY
Status: DISCONTINUED | OUTPATIENT
Start: 2018-09-19 | End: 2018-09-19 | Stop reason: HOSPADM

## 2018-09-18 RX ORDER — SODIUM CHLORIDE 0.9 % (FLUSH) 0.9 %
10 SYRINGE (ML) INJECTION AS NEEDED
Status: DISCONTINUED | OUTPATIENT
Start: 2018-09-18 | End: 2018-09-19 | Stop reason: HOSPADM

## 2018-09-18 RX ORDER — RANOLAZINE 500 MG/1
500 TABLET, EXTENDED RELEASE ORAL EVERY 12 HOURS SCHEDULED
Status: DISCONTINUED | OUTPATIENT
Start: 2018-09-18 | End: 2018-09-19 | Stop reason: HOSPADM

## 2018-09-18 RX ORDER — DEXTROSE MONOHYDRATE 25 G/50ML
25 INJECTION, SOLUTION INTRAVENOUS
Status: DISCONTINUED | OUTPATIENT
Start: 2018-09-18 | End: 2018-09-19 | Stop reason: HOSPADM

## 2018-09-18 RX ORDER — SODIUM CHLORIDE 0.9 % (FLUSH) 0.9 %
1-10 SYRINGE (ML) INJECTION AS NEEDED
Status: DISCONTINUED | OUTPATIENT
Start: 2018-09-18 | End: 2018-09-19 | Stop reason: HOSPADM

## 2018-09-18 RX ORDER — ASPIRIN 81 MG/1
324 TABLET ORAL
COMMUNITY

## 2018-09-18 RX ADMIN — RANOLAZINE 500 MG: 500 TABLET, FILM COATED, EXTENDED RELEASE ORAL at 22:12

## 2018-09-18 RX ADMIN — SODIUM CHLORIDE 1000 ML: 900 INJECTION, SOLUTION INTRAVENOUS at 13:49

## 2018-09-18 NOTE — H&P
HISTORY AND PHYSICAL  NAME: Bonnie Garcia  : 1955  MRN: [id number]    DATE OF ADMISSION: 18    DATE & TIME SEEN: 18 4:22 PM    PCP: Yudi Quiñones MD    CODE STATUS: Full code    CHIEF COMPLAINT Dizziness and weakness     HPI:  Bonnie Garcia is a 63 y.o. female with a CMH of DM type 2, Hyperthyroidism, HTN, and GERD  who presents complaining of  Dizziness and weakness of that occurred 4 hours ago.  Pt states she was at work when noticed a feeling of the room spinning around her. This was preceded by palpations. She states she tried speaking after this event and noticed that her speech was slowed. She also endorses full body weakness, and had numbness in her hands that lasted for 2 hours, and some perioral and right cheek numbness that occurred for 10 minutes. Pt states she had a similar event one year ago. Pt says her symptoms seemed to have improved after getting fluids, just like they did the last time she had this event. With both occurrences she remembers that she had not been eating beforehand. Pt denies any symptoms of nausea, vomiting, diarrhea, or constipation.    CONCURRENT MEDICAL HISTORY:  Past Medical History:   Diagnosis Date   • Chicken pox    • Coronary arteriosclerosis    • Diabetes mellitus (CMS/HCC)     TYPE 2   • Diastasis of muscle    • Fibrocystic breast    • GERD (gastroesophageal reflux disease)    • H/O Hashimoto thyroiditis    • History of echocardiogram 2007    ef 35-40% diastolic dysfunction   • History of mammogram 2015   • History of myocardial infarction    • History of Papanicolaou smear of cervix 2004    negative   • Hyperlipidemia    • Hypertension    • Measles    • Mild concentric left ventricular hypertrophy (LVH)    • Thyroid nodule    • Vitamin D deficiency        PAST SURGICAL HISTORY:  Past Surgical History:   Procedure Laterality Date   • CARDIAC CATHETERIZATION  2016    Successful PTCA and stenting of the mid LAD  done with deployment of a drug eluting stent with reduciton of stenosis from 60% to less thna 0% stenosis.FFR evaluation.   • CHOLECYSTECTOMY  10/01/1994   • COLONOSCOPY  2012    Internal grade 1 hemorrhoids in anus. Moderate amount of adhesions that are present in sigmoid colon region. 1 sessile polyp in rectum; removed by cold biopsy polypectomy   • COLONOSCOPY N/A 2018    Procedure: COLONOSCOPY;  Surgeon: Juan Pierre DO;  Location: [facility] ENDOSCOPY;  Service: Gastroenterology   • DIAGNOSTIC LAPAROSCOPY  2001    Right upper quadrant abdominal pain she has had a total of 3   • ENDOSCOPY N/A 2018    Procedure: ESOPHAGOGASTRODUODENOSCOPY;  Surgeon: Juan Pierre DO;  Location: [facility] ENDOSCOPY;  Service: Gastroenterology   • ESOPHAGOSCOPY / EGD  08/10/1994    Esophageal nodule(biopsied) R/O Biliary Microlithiasis   • HERNIA REPAIR  2007     (with parietex mesh.)   • INJECTION OF MEDICATION      Kenalog (2)     • TONSILLECTOMY AND ADENOIDECTOMY  1963   • TUBAL ABDOMINAL LIGATION  2007   • VAGINAL DELIVERY         FAMILY HISTORY:  Family History   Problem Relation Age of Onset   • Diabetes Mother    • Coronary artery disease Mother    • Heart attack Father          at 53 multiple mi's   • Coronary artery disease Other    • Diabetes Other    • Thyroid disease Other    • Hypothyroidism Daughter         SOCIAL HISTORY:  Social History     Social History   • Marital status:      Spouse name: N/A   • Number of children: 1   • Years of education: N/A     Occupational History   • RN      Social History Main Topics   • Smoking status: Former Smoker     Types: Cigarettes   • Smokeless tobacco: Never Used      Comment: smoked for 5 years   • Alcohol use No   • Drug use: No   • Sexual activity: Not Currently     Partners: Male     Birth control/ protection: Post-menopausal     Other Topics Concern   • Not on file     Social History Narrative   • No narrative on file        HOME MEDICATIONS:    Current Facility-Administered Medications:   •  sodium chloride 0.9 % flush 10 mL, 10 mL, Intravenous, PRN, Dmitri Barnard MD    Current Outpatient Prescriptions:   •  aspirin 325 MG tablet, Take 325 mg by mouth Daily., Disp: , Rfl:   •  carvedilol CR (COREG CR) 20 MG 24 hr capsule, Take 20 mg by mouth Daily., Disp: , Rfl:   •  Empagliflozin-Metformin HCl (SYNJARDY) 12.5-1000 MG tablet, Take 1 tablet by mouth 2 (Two) Times a Day With Meals., Disp: 60 tablet, Rfl: 11  •  ezetimibe (ZETIA) 10 MG tablet, Take 10 mg by mouth Daily., Disp: , Rfl:   •  ezetimibe (ZETIA) 10 MG tablet, TAKE 1 TABLET BY MOUTH DAILY, Disp: 30 tablet, Rfl: 0  •  glucose blood test strip, 1 each by Other route As Needed. Use as instructed, Disp: , Rfl:   •  icosapent ethyl (VASCEPA) 1 G capsule capsule, Take 2 g by mouth 2 (Two) Times a Day With Meals., Disp: , Rfl:   •  JANUVIA 100 MG tablet, TAKE 1 TABLET BY MOUTH DAILY BEFORE BREAKFAST, Disp: 30 tablet, Rfl: 0  •  losartan (COZAAR) 25 MG tablet, Take 1 tablet by mouth Daily., Disp: , Rfl: 0  •  meclizine (ANTIVERT) 25 MG tablet, Take 1 tablet by mouth 4 (Four) Times a Day As Needed for dizziness., Disp: 25 tablet, Rfl: 0  •  ondansetron (ZOFRAN) 4 MG tablet, Take 1 tablet by mouth Every 8 (Eight) Hours As Needed for Nausea or Vomiting., Disp: 12 tablet, Rfl: 0  •  prasugrel (EFFIENT) 10 MG tablet, Take 10 mg by mouth Daily., Disp: , Rfl:   •  ranolazine (RANEXA) 500 MG 12 hr tablet, Take 500 mg by mouth 2 (Two) Times a Day., Disp: , Rfl:   •  rosuvastatin (CRESTOR) 5 MG tablet, TAKE 1 TABLET BY MOUTH DAILY, Disp: 30 tablet, Rfl: 0  •  SITagliptin (JANUVIA) 100 MG tablet, Take 100 mg by mouth Daily., Disp: , Rfl:   •  vitamin D (ERGOCALCIFEROL) 88825 units capsule capsule, Take 1 capsule by mouth Every 7 (Seven) Days., Disp: 12 capsule, Rfl: 3    ALLERGIES:  Chloromycetin [chloramphenicol]; Iodinated diagnostic agents; and Levaquin [levofloxacin]    REVIEW OF  SYSTEMS  Review of Systems   Constitutional: Positive for fatigue. Negative for appetite change, chills, diaphoresis and fever.   HENT: Negative for ear discharge, ear pain, facial swelling, hearing loss, rhinorrhea, sinus pain, sinus pressure and tinnitus.    Eyes: Negative for pain, discharge and visual disturbance.   Respiratory: Negative for apnea, chest tightness, shortness of breath, wheezing and stridor.    Cardiovascular: Positive for palpitations. Negative for chest pain and leg swelling.   Gastrointestinal: Negative for abdominal distention, abdominal pain, constipation, diarrhea, nausea and vomiting.   Endocrine: Negative for heat intolerance.   Genitourinary: Negative for dysuria, frequency and urgency.   Musculoskeletal: Negative for arthralgias and joint swelling.   Skin: Negative for rash and wound.   Neurological: Positive for dizziness, weakness and numbness. Negative for facial asymmetry and speech difficulty.   Psychiatric/Behavioral: Negative for agitation and dysphoric mood. The patient is not nervous/anxious.        PHYSICAL EXAM:  Temp:  [97.2 °F (36.2 °C)] 97.2 °F (36.2 °C)  Heart Rate:  [82-84] 83  Resp:  [16-18] 16  BP: (123-144)/(66-79) 126/66  Body mass index is 28.95 kg/m².  Physical Exam   Constitutional: She is oriented to person, place, and time. She appears well-developed and well-nourished. No distress.   HENT:   Head: Normocephalic and atraumatic.   Right Ear: External ear normal.   Left Ear: External ear normal.   Mouth/Throat: Oropharynx is clear and moist. No oropharyngeal exudate.   Eyes: Pupils are equal, round, and reactive to light. Conjunctivae and EOM are normal. Right eye exhibits no discharge. Left eye exhibits no discharge. No scleral icterus.   Neck: Normal range of motion. Neck supple.   Cardiovascular: Normal rate, regular rhythm, normal heart sounds and intact distal pulses.  Exam reveals no gallop and no friction rub.    No murmur heard.  Pulmonary/Chest: Effort  normal and breath sounds normal. No stridor. No respiratory distress. She has no wheezes. She has no rales.   Abdominal: Soft. Bowel sounds are normal. She exhibits no distension. There is no tenderness.   Musculoskeletal: Normal range of motion. She exhibits no edema, tenderness or deformity.   Neurological: She is alert and oriented to person, place, and time. No sensory deficit. She exhibits normal muscle tone.   Skin: Skin is warm and dry. No rash noted. She is not diaphoretic. No erythema.   Psychiatric: She has a normal mood and affect. Her behavior is normal.       DIAGNOSTIC DATA:   Lab Results (last 24 hours)     Procedure Component Value Units Date/Time    TSH [[id number]]  (Normal) Collected:  09/18/18 1325    Specimen:  Blood Updated:  09/18/18 1431     TSH 1.860 mIU/mL     [place] Draw [[id number]] Collected:  09/18/18 1325    Specimen:  Blood Updated:  09/18/18 1430    Narrative:       The following orders were created for panel order [place] Draw.  Procedure                               Abnormality         Status                     ---------                               -----------         ------                     Light Blue Top[[id number]]                                   Final result               Green Top (Gel)[[id number]]                                  Final result               Lavender Top[[id number]]                                     Final result               Gold Top - SST[[id number]]                                   Final result                 Please view results for these tests on the individual orders.    Light Blue Top [[id number]] Collected:  09/18/18 1325    Specimen:  Blood Updated:  09/18/18 1430     Extra Tube hold for add-on     Comment: Auto resulted       Green Top (Gel) [[id number]] Collected:  09/18/18 1325    Specimen:  Blood Updated:  09/18/18 1430     Extra Tube Hold for add-ons.     Comment: Auto resulted.       Lavender Top [[id number]] Collected:  09/18/18 1325     Specimen:  Blood Updated:  09/18/18 1430     Extra Tube hold for add-on     Comment: Auto resulted       Gold Top - SST [[id number]] Collected:  09/18/18 1325    Specimen:  Blood Updated:  09/18/18 1430     Extra Tube Hold for add-ons.     Comment: Auto resulted.       T4, Free [[id number]]  (Normal) Collected:  09/18/18 1325    Specimen:  Blood Updated:  09/18/18 1418     Free T4 1.06 ng/dL     Troponin [[id number]]  (Normal) Collected:  09/18/18 1325    Specimen:  Blood Updated:  09/18/18 1400     Troponin I <0.012 ng/mL     Protime-INR [[id number]]  (Normal) Collected:  09/18/18 1325    Specimen:  Blood Updated:  09/18/18 1348     Protime 12.1 Seconds      INR 0.91    Narrative:       Therapeutic range for most indications is 2.0-3.0 INR,  or 2.5-3.5 for mechanical heart valves.    aPTT [[id number]]  (Normal) Collected:  09/18/18 1325    Specimen:  Blood Updated:  09/18/18 1348     PTT 24.5 seconds     Narrative:       The recommended Heparin therapeutic range is 68-97 seconds.    Comprehensive Metabolic Panel [[id number]]  (Abnormal) Collected:  09/18/18 1325    Specimen:  Blood Updated:  09/18/18 1347     Glucose 158 (H) mg/dL      BUN 13 mg/dL      Creatinine 0.45 (L) mg/dL      Sodium 137 mmol/L      Potassium 4.3 mmol/L      Chloride 101 mmol/L      CO2 21.0 (L) mmol/L      Calcium 9.0 mg/dL      Total Protein 6.7 g/dL      Albumin 4.20 g/dL      ALT (SGPT) 27 U/L      AST (SGOT) 25 U/L      Alkaline Phosphatase 113 U/L      Total Bilirubin 0.7 mg/dL      eGFR Non African Amer 141 (H) mL/min/1.73      Globulin 2.5 gm/dL      A/G Ratio 1.7 g/dL      BUN/Creatinine Ratio 28.9 (H)     Anion Gap 15.0 mmol/L     CBC & Differential [[id number]] Collected:  09/18/18 1325    Specimen:  Blood Updated:  09/18/18 1334    Narrative:       The following orders were created for panel order CBC & Differential.  Procedure                               Abnormality         Status                     ---------                                -----------         ------                     CBC Auto Differential[[id number]]        Abnormal            Final result                 Please view results for these tests on the individual orders.    CBC Auto Differential [[id number]]  (Abnormal) Collected:  09/18/18 1325    Specimen:  Blood Updated:  09/18/18 1334     WBC 5.95 10*3/mm3      RBC 4.40 10*6/mm3      Hemoglobin 13.6 g/dL      Hematocrit 39.8 %      MCV 90.5 fL      MCH 30.9 pg      MCHC 34.2 g/dL      RDW 13.4 %      RDW-SD 44.1 fl      MPV 10.6 fL      Platelets 206 10*3/mm3      Neutrophil % 62.7 %      Lymphocyte % 24.9 %      Monocyte % 8.2 %      Eosinophil % 2.5 %      Basophil % 0.5 %      Immature Grans % 1.2 (H) %      Neutrophils, Absolute 3.73 10*3/mm3      Lymphocytes, Absolute 1.48 10*3/mm3      Monocytes, Absolute 0.49 10*3/mm3      Eosinophils, Absolute 0.15 10*3/mm3      Basophils, Absolute 0.03 10*3/mm3      Immature Grans, Absolute 0.07 (H) 10*3/mm3     POC Glucose Once [[id number]]  (Abnormal) Collected:  09/18/18 1311    Specimen:  Blood Updated:  09/18/18 1329     Glucose 164 (H) mg/dL      Comment: RN NotifiedOperator: [id number] DENEEN Walker ID: [id number]              Imaging Results (last 24 hours)     Procedure Component Value Units Date/Time    XR Chest 1 View [[id number]] Collected:  09/18/18 1318     Updated:  09/18/18 1536    Narrative:         PORTABLE CHEST    HISTORY: Acute stroke protocol on seven less than 12 hours.    Portable AP upright film of the chest was obtained at 1:10 PM.  COMPARISON: June 28, 2016    The lungs are clear of an acute process.  The heart is not enlarged.  The pulmonary vasculature is not increased.  No pleural effusion.  No pneumothorax.  No acute osseous abnormality.      Impression:       CONCLUSION:  No Acute Disease    [id number]    Electronically signed by:  Brad Rogers MD  9/18/2018 1:19 PM CDT  Workstation: [id number]    CT Head Without Contrast Stroke Protocol [[id number]]  Collected:  09/18/18 1248     Updated:  09/18/18 1319    Narrative:         CT Head Without Contrast    History: Stroke    Axial scans of the brain were obtained without intravenous  contrast.  Coronal and sagital reconstructions were preformed.    This exam was performed according to our departmental  dose-optimization program, which includes automated exposure  control, adjustment of the mA and/or kV according to patient size  and/or use of iterative reconstruction technique.    DLP: 885.50    Comparison: August 20, 2017    Bone windows are unremarkable.  Mucosal thickening left ethmoid sinus.    No acute process.  Minimal cerebral and cerebellar atrophy.  Minimal periventricular white matter small vessel disease.  No hemorrhage.  No mass.  No abnormal areas of increased attenuation.  No midline shift.  No abnormal extra-axial fluid collections.      Impression:       CONCLUSION:  No acute process.  Minimal cerebral and cerebellar atrophy.  Minimal periventricular white matter small vessel disease.    [id number]    Electronically signed by:  Brad Rogers MD  9/18/2018 1:17 PM CDT  Workstation: [email or web address]            I reviewed the patient's new clinical results.    ASSESSMENT AND PLAN: This is a 63 y.o. female with:    Active Hospital Problems    Diagnosis Date Noted   • **Dizziness [R42] 09/18/2018     Likely secondary to Vertigo. SOC consult evaluated the patient and report low likelihood of stroke. Recommended TIA workup.  - Ordered MRI brain, Carotid U/S, ECHO, and Lipid panel  - Continue to monitor on telemetry.   - q4h neurochecks.        • Type 2 diabetes mellitus with diabetic cardiomyopathy (CMS/HCC) [E11.69, I43] 06/19/2018     - Finger stick glucose checks.  - SSI  - I will order Jardiance 12.5 BID and Metformin 1000 mg BID as we do not have home dose of Synjardy on formulary.   - I will order Linagliptin 5 mg as we do not have home dose of Januvia.        • Mixed diabetic hyperlipidemia associated with  type 2 diabetes mellitus (CMS/[facility]) [E11.69, E78.2] 01/31/2017     - Continue Home Crestor.  - I will hold Zetia as this is not on formulary.   - I will recheck a lipid panel.      • Hypertension associated with diabetes (CMS/[facility]) [E11.59, I10] 01/31/2017     Monitor BP per protocol.  - Continue Coreg and Losartan.      • Coronary artery disease involving native coronary artery of native heart [I25.10] 01/31/2017     Continue home dose of Effient and ASA.   -Continue home Coreg.          DVT prophylaxis: SCDs/TEDs     [facility] # [id number], reviewed and consistent with patient reported medications.    Expected Length of Stay: Where: home and When:  1-2days    I discussed the patients findings and my recommendations with patient.     Dr. Hough is the attending on record at time of admission, He is aware of the patient's status and agrees with the above history and physical.          This document has been electronically signed by Kusum Jarvis MD on September 18, 2018 4:48 PM

## 2018-09-18 NOTE — ED PROVIDER NOTES
Subjective   Patient presents emergency Department with a atypical constellation of symptoms including dizziness global weakness.  Some tingling in bilateral hands.  Some periorbital and perioral numbness including the tongue.  All of this was exacerbated and waned in the matter of minutes.  Patient denies any focal neurologic findings.  Patient did not feel presyncopal.  Patient had dizziness nausea with vomiting ×2.  On that has since abated.  Patient also notes dizziness with her head movement earlier, though that is abated as well.  The patient is very specific that the weakness was global and when she was trying to walk her she was able to though her legs felt heavy as well as her upper extremities when she is trying to scratch her face, but she was able to complete these tasks.  Patient also notes that she was having difficulty with her speech hasn't felt somewhat slow to her.  There was no slurred speech.            Review of Systems   Constitutional: Negative for appetite change, chills and fever.   HENT: Negative.  Negative for congestion.    Eyes: Negative.  Negative for photophobia and visual disturbance.   Respiratory: Negative.  Negative for cough, chest tightness and shortness of breath.    Cardiovascular: Negative.  Negative for chest pain and palpitations.   Gastrointestinal: Positive for nausea. Negative for abdominal pain, constipation and diarrhea.   Endocrine: Negative.    Genitourinary: Negative.  Negative for decreased urine volume, dysuria, flank pain and hematuria.   Musculoskeletal: Negative.  Negative for arthralgias, back pain, myalgias, neck pain and neck stiffness.   Skin: Negative.  Negative for pallor.   Neurological: Positive for dizziness, tremors and weakness. Negative for syncope, light-headedness, numbness and headaches.   Psychiatric/Behavioral: Negative.  Negative for confusion and suicidal ideas. The patient is not nervous/anxious.    All other systems reviewed and are  negative.      Past Medical History:   Diagnosis Date   • Chicken pox    • Coronary arteriosclerosis    • Diabetes mellitus (CMS/HCC)     TYPE 2   • Diastasis of muscle    • Fibrocystic breast    • GERD (gastroesophageal reflux disease)    • H/O Hashimoto thyroiditis    • History of echocardiogram 01/01/2007    ef 35-40% diastolic dysfunction   • History of mammogram 04/13/2015   • History of myocardial infarction 2006   • History of Papanicolaou smear of cervix 08/04/2004    negative   • Hyperlipidemia    • Hypertension    • Measles    • Mild concentric left ventricular hypertrophy (LVH)    • Thyroid nodule    • Vitamin D deficiency        Allergies   Allergen Reactions   • Chloromycetin [Chloramphenicol]    • Iodinated Diagnostic Agents Hives     sneezing   • Levaquin [Levofloxacin] Rash       Past Surgical History:   Procedure Laterality Date   • CARDIAC CATHETERIZATION  06/28/2016    Successful PTCA and stenting of the mid LAD done with deployment of a drug eluting stent with reduciton of stenosis from 60% to less thna 0% stenosis.FFR evaluation.   • CHOLECYSTECTOMY  10/01/1994   • COLONOSCOPY  11/27/2012    Internal grade 1 hemorrhoids in anus. Moderate amount of adhesions that are present in sigmoid colon region. 1 sessile polyp in rectum; removed by cold biopsy polypectomy   • COLONOSCOPY N/A 7/12/2018    Procedure: COLONOSCOPY;  Surgeon: Juan Pierre DO;  Location: [facility] ENDOSCOPY;  Service: Gastroenterology   • DIAGNOSTIC LAPAROSCOPY  12/17/2001    Right upper quadrant abdominal pain she has had a total of 3   • ENDOSCOPY N/A 7/12/2018    Procedure: ESOPHAGOGASTRODUODENOSCOPY;  Surgeon: Juan Pierre DO;  Location: [facility] ENDOSCOPY;  Service: Gastroenterology   • ESOPHAGOSCOPY / EGD  08/10/1994    Esophageal nodule(biopsied) R/O Biliary Microlithiasis   • HERNIA REPAIR  07/23/2007     (with parietex mesh.)   • INJECTION OF MEDICATION      Kenalog (2)     • TONSILLECTOMY AND ADENOIDECTOMY   1963   • TUBAL ABDOMINAL LIGATION  2007   • VAGINAL DELIVERY         Family History   Problem Relation Age of Onset   • Diabetes Mother    • Coronary artery disease Mother    • Heart attack Father          at 53 multiple mi's   • Coronary artery disease Other    • Diabetes Other    • Thyroid disease Other    • Hypothyroidism Daughter        Social History     Social History   • Marital status:    • Number of children: 1     Occupational History   • RN      Social History Main Topics   • Smoking status: Former Smoker     Types: Cigarettes   • Smokeless tobacco: Never Used      Comment: smoked for 5 years   • Alcohol use No   • Drug use: No   • Sexual activity: Not Currently     Partners: Male     Birth control/ protection: Post-menopausal     Other Topics Concern   • Not on file           Objective   Physical Exam   Constitutional: She is oriented to person, place, and time. She appears well-developed and well-nourished. No distress.   HENT:   Head: Normocephalic and atraumatic.   Nose: Nose normal.   Mouth/Throat: Oropharynx is clear and moist.   Eyes: Conjunctivae and EOM are normal. No scleral icterus.   Neck: Normal range of motion. Neck supple. No JVD present.   Cardiovascular: Normal rate, regular rhythm, normal heart sounds and intact distal pulses.  Exam reveals no gallop and no friction rub.    No murmur heard.  Pulmonary/Chest: Effort normal. No respiratory distress. She has no wheezes. She has no rales. She exhibits no tenderness.   Abdominal: Soft. She exhibits no distension and no mass. There is no tenderness. There is no rebound and no guarding.   Musculoskeletal: Normal range of motion. She exhibits no edema, tenderness or deformity.   Lymphadenopathy:     She has no cervical adenopathy.   Neurological: She is alert and oriented to person, place, and time. No cranial nerve deficit. She exhibits normal muscle tone.   No focal neurologic findings.   Skin: Skin is warm and dry.  Capillary refill takes less than 2 seconds. No rash noted. She is not diaphoretic. No erythema. No pallor.   Psychiatric: She has a normal mood and affect. Her behavior is normal. Judgment and thought content normal.   Nursing note and vitals reviewed.      Procedures           ED Course      Labs Reviewed   COMPREHENSIVE METABOLIC PANEL - Abnormal; Notable for the following:        Result Value    Glucose 158 (*)     Creatinine 0.45 (*)     CO2 21.0 (*)     eGFR Non  Amer 141 (*)     BUN/Creatinine Ratio 28.9 (*)     All other components within normal limits   CBC WITH AUTO DIFFERENTIAL - Abnormal; Notable for the following:     Immature Grans % 1.2 (*)     Immature Grans, Absolute 0.07 (*)     All other components within normal limits   POCT GLUCOSE FINGERSTICK - Abnormal; Notable for the following:     Glucose 164 (*)     All other components within normal limits   PROTIME-INR - Normal    Narrative:     Therapeutic range for most indications is 2.0-3.0 INR,  or 2.5-3.5 for mechanical heart valves.   APTT - Normal    Narrative:     The recommended Heparin therapeutic range is 68-97 seconds.   TROPONIN (IN-HOUSE) - Normal   TSH - Normal   T4, FREE - Normal   RAINBOW DRAW    Narrative:     The following orders were created for panel order [place] Draw.  Procedure                               Abnormality         Status                     ---------                               -----------         ------                     Light Blue Top[[id number]]                                   Final result               Green Top (Gel)[[id number]]                                  Final result               Lavender Top[[id number]]                                     Final result               Gold Top - SST[[id number]]                                   Final result                 Please view results for these tests on the individual orders.   POCT GLUCOSE FINGERSTICK   TYPE AND SCREEN   PREVIOUS HISTORY   CBC AND  DIFFERENTIAL    Narrative:     The following orders were created for panel order CBC & Differential.  Procedure                               Abnormality         Status                     ---------                               -----------         ------                     CBC Auto Differential[[id number]]        Abnormal            Final result                 Please view results for these tests on the individual orders.   LIGHT BLUE TOP   GREEN TOP   LAVENDER TOP   GOLD TOP - SST       XR Chest 1 View   Final Result   CONCLUSION:   No Acute Disease      [id number]      Electronically signed by:  Brad Rogers MD  9/18/2018 1:19 PM CDT   Workstation: [email or web address]      CT Head Without Contrast Stroke Protocol   Final Result   CONCLUSION:   No acute process.   Minimal cerebral and cerebellar atrophy.   Minimal periventricular white matter small vessel disease.      [id number]      Electronically signed by:  Brad Rogers MD  9/18/2018 1:17 PM CDT   Workstation: [email or web address]        Patient with nonspecific symptoms of nonfocal weakness.  SOC notes possible vertebrobasilar insufficiency recommends an MRI/MRA as well as admission for TIA workup.  They do feel this patient is appropriate for our facility.  Discussed with the patient that we have no neurology at [facility], patient would like to stay for further evaluation rather than transfer.            MDM      Final diagnoses:   Transient cerebral ischemia, unspecified type   Weakness            Dmitri Barnard MD  09/18/18 1811

## 2018-09-18 NOTE — PROGRESS NOTES
[facility] Medicine Admission      Date of Admission: 9/18/2018      Primary Care Physician: Yudi Quiñones MD      Chief Complaint: Dizziness    HPI: 63 year old with past medical history significant for CAD s/p stents, DM, HTN, Hyperlipidemia presented to the ER with complains of dizziness. Patient reports that she has not had her lunch today and was working and had a feeling of overall not feeling well and generalized weakness. This was followed by a sensation of dizziness. He felt as if  the world around her is spinnng. She did not have any syncope or near syncope.  She denies any chest pain or palpitations preceding this event.  She also denies any headache blurry vision or double vision.  Patient denies any facial droop or slurred speech.  Patient did notice to have some slowing of her speech but denies any dysarthria or difficulty finding words.   While in the ER she did feel some tingling sensation on the roof of her mouth and also around her lips and right side of the face.    Patient reports to have had similar problems around 1 year back and was thought to have vertigo and she was given meclizine to be taken on a when necessary basis.    Apart from that no other significant problems have been reported to me at this time    Past Medical History:  has a past medical history of Chicken pox; Coronary arteriosclerosis; Diabetes mellitus (CMS/HCC); Diastasis of muscle; Fibrocystic breast; GERD (gastroesophageal reflux disease); H/O Hashimoto thyroiditis; History of echocardiogram (01/01/2007); History of mammogram (04/13/2015); History of myocardial infarction (2006); History of Papanicolaou smear of cervix (08/04/2004); Hyperlipidemia; Hypertension; Measles; Mild concentric left ventricular hypertrophy (LVH); Thyroid nodule; and Vitamin D deficiency.    Past Surgical History:  has a past surgical history that includes Cardiac catheterization (06/28/2016);  Cholecystectomy (10/01/1994); Colonoscopy (11/27/2012); Esophagoscopy / EGD (08/10/1994); Injection of Medication; Laparoscopy (12/17/2001); Hernia repair (07/23/2007); tonsillectomy and adenoidectomy (05/13/1963); Tubal ligation (07/23/2007); Vaginal delivery; Esophagogastroduodenoscopy (N/A, 7/12/2018); and Colonoscopy (N/A, 7/12/2018).    Family History: family history includes Coronary artery disease in her mother and other; Diabetes in her mother and other; Heart attack in her father; Hypothyroidism in her daughter; Thyroid disease in her other.    Social History:  reports that she has quit smoking. Her smoking use included Cigarettes. She has never used smokeless tobacco. She reports that she does not drink alcohol or use drugs.    Allergies:   Allergies   Allergen Reactions   • Chloromycetin [Chloramphenicol]    • Iodinated Diagnostic Agents Hives     sneezing   • Levaquin [Levofloxacin] Rash       Medications: .  Prior to Admission medications    Medication Sig Start Date End Date Taking? Authorizing Provider   aspirin 325 MG tablet Take 325 mg by mouth Daily.    Mirella Olivarez MD   carvedilol CR (COREG CR) 20 MG 24 hr capsule Take 20 mg by mouth Daily.    Mirella Olivarez MD   Empagliflozin-Metformin HCl (SYNJARDY) 12.5-1000 MG tablet Take 1 tablet by mouth 2 (Two) Times a Day With Meals. 4/23/18   Victorino Smith MD   ezetimibe (ZETIA) 10 MG tablet Take 10 mg by mouth Daily.    Mirella Olivarez MD   ezetimibe (ZETIA) 10 MG tablet TAKE 1 TABLET BY MOUTH DAILY 9/4/18   Victorino Smith MD   glucose blood test strip 1 each by Other route As Needed. Use as instructed    Mirella Olivarez MD   icosapent ethyl (VASCEPA) 1 G capsule capsule Take 2 g by mouth 2 (Two) Times a Day With Meals.    Mirella Olivarez MD   JANUVIA 100 MG tablet TAKE 1 TABLET BY MOUTH DAILY BEFORE BREAKFAST 9/4/18   Victorino Smith MD   losartan (COZAAR) 25 MG tablet Take 1 tablet by  mouth Daily. 11/16/17   Mirella Olivarez MD   meclizine (ANTIVERT) 25 MG tablet Take 1 tablet by mouth 4 (Four) Times a Day As Needed for dizziness. 8/28/17   Clif Flores MD   ondansetron (ZOFRAN) 4 MG tablet Take 1 tablet by mouth Every 8 (Eight) Hours As Needed for Nausea or Vomiting. 8/28/17   Clif Flores MD   prasugrel (EFFIENT) 10 MG tablet Take 10 mg by mouth Daily.    Mirella Olivarez MD   ranolazine (RANEXA) 500 MG 12 hr tablet Take 500 mg by mouth 2 (Two) Times a Day.    Mirella Olivarez MD   rosuvastatin (CRESTOR) 5 MG tablet TAKE 1 TABLET BY MOUTH DAILY 9/4/18   Victorino Smith MD   SITagliptin (JANUVIA) 100 MG tablet Take 100 mg by mouth Daily.    Mirella Olivarez MD   vitamin D (ERGOCALCIFEROL) 43307 units capsule capsule Take 1 capsule by mouth Every 7 (Seven) Days. 2/22/18   Yudi Quiñones MD       Review of Systems:  Review of Systems   Constitutional: Positive for fatigue. Negative for fever.   HENT: Negative for congestion, ear discharge, ear pain, hearing loss, nosebleeds and trouble swallowing.    Eyes: Negative for pain and visual disturbance.   Respiratory: Negative for chest tightness and shortness of breath.    Cardiovascular: Negative for chest pain, palpitations and leg swelling.   Gastrointestinal: Negative for abdominal distention, abdominal pain, diarrhea, nausea and vomiting.   Genitourinary: Negative for difficulty urinating, dysuria and urgency.   Musculoskeletal: Negative for gait problem and neck stiffness.   Skin: Negative for color change and pallor.   Neurological: Positive for dizziness. Negative for facial asymmetry, speech difficulty, weakness, light-headedness and headaches.   Psychiatric/Behavioral: Negative for agitation, confusion and suicidal ideas.      Otherwise complete ROS is negative except as mentioned above.    Physical Exam:   Temp:  [97.2 °F (36.2 °C)] 97.2 °F (36.2 °C)  Heart Rate:  [82-84] 83  Resp:  [16-18] 16  BP:  (123-144)/(66-79) 126/66  Physical Exam   Constitutional: She is oriented to person, place, and time. She appears well-developed and well-nourished.   HENT:   Head: Normocephalic and atraumatic.   Eyes: Pupils are equal, round, and reactive to light.   Neck: Neck supple.   Cardiovascular: Normal rate, regular rhythm and normal heart sounds.    Pulmonary/Chest: Effort normal and breath sounds normal.   Abdominal: Soft. Bowel sounds are normal.   Neurological: She is alert and oriented to person, place, and time. She displays normal reflexes. No cranial nerve deficit or sensory deficit. She exhibits normal muscle tone. Coordination normal.   Skin: Skin is warm.   Psychiatric: She has a normal mood and affect. Her behavior is normal. Judgment and thought content normal.         Results Reviewed:  I have personally reviewed current lab, radiology, and data and agree with results.  Lab Results (last 24 hours)     Procedure Component Value Units Date/Time    TSH [[id number]]  (Normal) Collected:  09/18/18 1325    Specimen:  Blood Updated:  09/18/18 1431     TSH 1.860 mIU/mL     [place] Draw [[id number]] Collected:  09/18/18 1325    Specimen:  Blood Updated:  09/18/18 1430    Narrative:       The following orders were created for panel order [place] Draw.  Procedure                               Abnormality         Status                     ---------                               -----------         ------                     Light Blue Top[[id number]]                                   Final result               Green Top (Gel)[[id number]]                                  Final result               Lavender Top[[id number]]                                     Final result               Gold Top - SST[[id number]]                                   Final result                 Please view results for these tests on the individual orders.    Light Blue Top [[id number]] Collected:  09/18/18 1325    Specimen:  Blood Updated:  09/18/18  1430     Extra Tube hold for add-on     Comment: Auto resulted       Green Top (Gel) [[id number]] Collected:  09/18/18 1325    Specimen:  Blood Updated:  09/18/18 1430     Extra Tube Hold for add-ons.     Comment: Auto resulted.       Lavender Top [[id number]] Collected:  09/18/18 1325    Specimen:  Blood Updated:  09/18/18 1430     Extra Tube hold for add-on     Comment: Auto resulted       Gold Top - SST [[id number]] Collected:  09/18/18 1325    Specimen:  Blood Updated:  09/18/18 1430     Extra Tube Hold for add-ons.     Comment: Auto resulted.       T4, Free [[id number]]  (Normal) Collected:  09/18/18 1325    Specimen:  Blood Updated:  09/18/18 1418     Free T4 1.06 ng/dL     Troponin [[id number]]  (Normal) Collected:  09/18/18 1325    Specimen:  Blood Updated:  09/18/18 1400     Troponin I <0.012 ng/mL     Protime-INR [[id number]]  (Normal) Collected:  09/18/18 1325    Specimen:  Blood Updated:  09/18/18 1348     Protime 12.1 Seconds      INR 0.91    Narrative:       Therapeutic range for most indications is 2.0-3.0 INR,  or 2.5-3.5 for mechanical heart valves.    aPTT [[id number]]  (Normal) Collected:  09/18/18 1325    Specimen:  Blood Updated:  09/18/18 1348     PTT 24.5 seconds     Narrative:       The recommended Heparin therapeutic range is 68-97 seconds.    Comprehensive Metabolic Panel [[id number]]  (Abnormal) Collected:  09/18/18 1325    Specimen:  Blood Updated:  09/18/18 1347     Glucose 158 (H) mg/dL      BUN 13 mg/dL      Creatinine 0.45 (L) mg/dL      Sodium 137 mmol/L      Potassium 4.3 mmol/L      Chloride 101 mmol/L      CO2 21.0 (L) mmol/L      Calcium 9.0 mg/dL      Total Protein 6.7 g/dL      Albumin 4.20 g/dL      ALT (SGPT) 27 U/L      AST (SGOT) 25 U/L      Alkaline Phosphatase 113 U/L      Total Bilirubin 0.7 mg/dL      eGFR Non African Amer 141 (H) mL/min/1.73      Globulin 2.5 gm/dL      A/G Ratio 1.7 g/dL      BUN/Creatinine Ratio 28.9 (H)     Anion Gap 15.0 mmol/L     CBC & Differential  [[id number]] Collected:  09/18/18 1325    Specimen:  Blood Updated:  09/18/18 1334    Narrative:       The following orders were created for panel order CBC & Differential.  Procedure                               Abnormality         Status                     ---------                               -----------         ------                     CBC Auto Differential[[id number]]        Abnormal            Final result                 Please view results for these tests on the individual orders.    CBC Auto Differential [[id number]]  (Abnormal) Collected:  09/18/18 1325    Specimen:  Blood Updated:  09/18/18 1334     WBC 5.95 10*3/mm3      RBC 4.40 10*6/mm3      Hemoglobin 13.6 g/dL      Hematocrit 39.8 %      MCV 90.5 fL      MCH 30.9 pg      MCHC 34.2 g/dL      RDW 13.4 %      RDW-SD 44.1 fl      MPV 10.6 fL      Platelets 206 10*3/mm3      Neutrophil % 62.7 %      Lymphocyte % 24.9 %      Monocyte % 8.2 %      Eosinophil % 2.5 %      Basophil % 0.5 %      Immature Grans % 1.2 (H) %      Neutrophils, Absolute 3.73 10*3/mm3      Lymphocytes, Absolute 1.48 10*3/mm3      Monocytes, Absolute 0.49 10*3/mm3      Eosinophils, Absolute 0.15 10*3/mm3      Basophils, Absolute 0.03 10*3/mm3      Immature Grans, Absolute 0.07 (H) 10*3/mm3     POC Glucose Once [[id number]]  (Abnormal) Collected:  09/18/18 1311    Specimen:  Blood Updated:  09/18/18 1329     Glucose 164 (H) mg/dL      Comment: RN NotifiedOperator: [id number] DENEEN Walker ID: [id number]           Imaging Results (last 24 hours)     Procedure Component Value Units Date/Time    XR Chest 1 View [[id number]] Collected:  09/18/18 1318     Updated:  09/18/18 1536    Narrative:         PORTABLE CHEST    HISTORY: Acute stroke protocol on seven less than 12 hours.    Portable AP upright film of the chest was obtained at 1:10 PM.  COMPARISON: June 28, 2016    The lungs are clear of an acute process.  The heart is not enlarged.  The pulmonary vasculature is not  increased.  No pleural effusion.  No pneumothorax.  No acute osseous abnormality.      Impression:       CONCLUSION:  No Acute Disease    [id number]    Electronically signed by:  Brad Rogers MD  9/18/2018 1:19 PM CDT  Workstation: [email or web address]    CT Head Without Contrast Stroke Protocol [[id number]] Collected:  09/18/18 1248     Updated:  09/18/18 1319    Narrative:         CT Head Without Contrast    History: Stroke    Axial scans of the brain were obtained without intravenous  contrast.  Coronal and sagital reconstructions were preformed.    This exam was performed according to our departmental  dose-optimization program, which includes automated exposure  control, adjustment of the mA and/or kV according to patient size  and/or use of iterative reconstruction technique.    DLP: 885.50    Comparison: August 20, 2017    Bone windows are unremarkable.  Mucosal thickening left ethmoid sinus.    No acute process.  Minimal cerebral and cerebellar atrophy.  Minimal periventricular white matter small vessel disease.  No hemorrhage.  No mass.  No abnormal areas of increased attenuation.  No midline shift.  No abnormal extra-axial fluid collections.      Impression:       CONCLUSION:  No acute process.  Minimal cerebral and cerebellar atrophy.  Minimal periventricular white matter small vessel disease.    [id number]    Electronically signed by:  Brad Rogers MD  9/18/2018 1:17 PM CDT  Workstation: [email or web address]            Assessment and plan    1. Dizziness- probably due to vertigo, BPPV vs labyrinthitis. Continue on PRN meclizine. Also start on IVF  2. Perioral numbness and right sided facial tingling- SOC had been consulted. MRI/ MRA ordered. Also carotid doppler ordered. Continue with aspirin and effient.along with statin  3. CAD s/p stents- continue with Aspirin, Effient, coreg, losartan and statin.  4. Chronic cerebral ischemia- continue with aspirin.  5. DM- insulin sliding scale and ADA diet. Also A1c will be ordered  6.  DVT prophylaxis- no anticoagulation due to low risk and padua score <4    I saw and evaluated the patient with the resident.  I discussed the case with the resident and agree with the findings and plan as documented in the residents note.          This document has been electronically signed by Luis Alberto Hough MD on September 18, 2018 5:10 PM      Luis Alberto Hough MD  09/18/18  4:46 PM

## 2018-09-18 NOTE — ED NOTES
Report given to dr mccracken regarding pt's sx and time of onset      Marsha Mendieta RN  09/18/18 [id number]

## 2018-09-18 NOTE — ED NOTES
"Patient states 20-30 minutes ago she experienced hand numbness and speech difficulty.  Patient states that the speech difficulty has \"almost \" reslolved at this time.  Patient c/o dizziness.     Chuyita Fox RN  09/18/18 2759    "

## 2018-09-19 ENCOUNTER — APPOINTMENT (OUTPATIENT)
Dept: MRI IMAGING | Facility: HOSPITAL | Age: 63
End: 2018-09-19

## 2018-09-19 ENCOUNTER — APPOINTMENT (OUTPATIENT)
Dept: ULTRASOUND IMAGING | Facility: HOSPITAL | Age: 63
End: 2018-09-19

## 2018-09-19 ENCOUNTER — APPOINTMENT (OUTPATIENT)
Dept: CARDIOLOGY | Facility: HOSPITAL | Age: 63
End: 2018-09-19
Attending: FAMILY MEDICINE

## 2018-09-19 VITALS
OXYGEN SATURATION: 96 % | HEIGHT: 63 IN | BODY MASS INDEX: 28.21 KG/M2 | WEIGHT: 159.2 LBS | DIASTOLIC BLOOD PRESSURE: 64 MMHG | RESPIRATION RATE: 18 BRPM | HEART RATE: 84 BPM | SYSTOLIC BLOOD PRESSURE: 129 MMHG | TEMPERATURE: 98.1 F

## 2018-09-19 PROBLEM — R42 DIZZINESS: Status: RESOLVED | Noted: 2018-09-18 | Resolved: 2018-09-19

## 2018-09-19 PROBLEM — G45.9 TRANSIENT CEREBRAL ISCHEMIA: Status: ACTIVE | Noted: 2018-09-19

## 2018-09-19 PROBLEM — G45.9 TRANSIENT CEREBRAL ISCHEMIA: Status: RESOLVED | Noted: 2018-09-19 | Resolved: 2018-09-19

## 2018-09-19 LAB
ANION GAP SERPL CALCULATED.3IONS-SCNC: 10 MMOL/L (ref 5–15)
BASOPHILS # BLD AUTO: 0.02 10*3/MM3 (ref 0–0.2)
BASOPHILS NFR BLD AUTO: 0.5 % (ref 0–2)
BH CV ECHO MEAS - ACS: 1.9 CM
BH CV ECHO MEAS - AO MAX PG (FULL): 7.2 MMHG
BH CV ECHO MEAS - AO MAX PG: 10.8 MMHG
BH CV ECHO MEAS - AO MEAN PG (FULL): 4 MMHG
BH CV ECHO MEAS - AO MEAN PG: 6 MMHG
BH CV ECHO MEAS - AO ROOT AREA (BSA CORRECTED): 1.7
BH CV ECHO MEAS - AO ROOT AREA: 6.6 CM^2
BH CV ECHO MEAS - AO ROOT DIAM: 2.9 CM
BH CV ECHO MEAS - AO V2 MAX: 164 CM/SEC
BH CV ECHO MEAS - AO V2 MEAN: 113 CM/SEC
BH CV ECHO MEAS - AO V2 VTI: 36.3 CM
BH CV ECHO MEAS - ASC AORTA: 3 CM
BH CV ECHO MEAS - AVA(I,A): 1.5 CM^2
BH CV ECHO MEAS - AVA(I,D): 1.5 CM^2
BH CV ECHO MEAS - AVA(V,A): 1.6 CM^2
BH CV ECHO MEAS - AVA(V,D): 1.6 CM^2
BH CV ECHO MEAS - BSA(HAYCOCK): 1.8 M^2
BH CV ECHO MEAS - BSA: 1.8 M^2
BH CV ECHO MEAS - BZI_BMI: 28.2 KILOGRAMS/M^2
BH CV ECHO MEAS - BZI_METRIC_HEIGHT: 160 CM
BH CV ECHO MEAS - BZI_METRIC_WEIGHT: 72.1 KG
BH CV ECHO MEAS - EDV(CUBED): 86.9 ML
BH CV ECHO MEAS - EDV(TEICH): 89.1 ML
BH CV ECHO MEAS - EF(CUBED): 72.2 %
BH CV ECHO MEAS - EF(TEICH): 64.2 %
BH CV ECHO MEAS - ESV(CUBED): 24.1 ML
BH CV ECHO MEAS - ESV(TEICH): 31.9 ML
BH CV ECHO MEAS - FS: 34.8 %
BH CV ECHO MEAS - IVS/LVPW: 1.1
BH CV ECHO MEAS - IVSD: 0.9 CM
BH CV ECHO MEAS - LA DIMENSION: 3.4 CM
BH CV ECHO MEAS - LA/AO: 1.2
BH CV ECHO MEAS - LV MASS(C)D: 118.7 GRAMS
BH CV ECHO MEAS - LV MASS(C)DI: 67.7 GRAMS/M^2
BH CV ECHO MEAS - LV MAX PG: 3.5 MMHG
BH CV ECHO MEAS - LV MEAN PG: 2 MMHG
BH CV ECHO MEAS - LV V1 MAX: 94 CM/SEC
BH CV ECHO MEAS - LV V1 MEAN: 57.6 CM/SEC
BH CV ECHO MEAS - LV V1 VTI: 19.4 CM
BH CV ECHO MEAS - LVIDD: 4.4 CM
BH CV ECHO MEAS - LVIDS: 2.9 CM
BH CV ECHO MEAS - LVOT AREA (M): 2.8 CM^2
BH CV ECHO MEAS - LVOT AREA: 2.8 CM^2
BH CV ECHO MEAS - LVOT DIAM: 1.9 CM
BH CV ECHO MEAS - LVPWD: 0.79 CM
BH CV ECHO MEAS - MR MAX PG: 77.8 MMHG
BH CV ECHO MEAS - MR MAX VEL: 441 CM/SEC
BH CV ECHO MEAS - MV A MAX VEL: 90.7 CM/SEC
BH CV ECHO MEAS - MV DEC SLOPE: 465 CM/SEC^2
BH CV ECHO MEAS - MV E MAX VEL: 91.2 CM/SEC
BH CV ECHO MEAS - MV E/A: 1
BH CV ECHO MEAS - MV MAX PG: 5.9 MMHG
BH CV ECHO MEAS - MV MEAN PG: 3 MMHG
BH CV ECHO MEAS - MV P1/2T MAX VEL: 81.4 CM/SEC
BH CV ECHO MEAS - MV P1/2T: 51.3 MSEC
BH CV ECHO MEAS - MV V2 MAX: 121 CM/SEC
BH CV ECHO MEAS - MV V2 MEAN: 75.2 CM/SEC
BH CV ECHO MEAS - MV V2 VTI: 21.6 CM
BH CV ECHO MEAS - MVA P1/2T LCG: 2.7 CM^2
BH CV ECHO MEAS - MVA(P1/2T): 4.3 CM^2
BH CV ECHO MEAS - MVA(VTI): 2.5 CM^2
BH CV ECHO MEAS - PA MAX PG: 6.3 MMHG
BH CV ECHO MEAS - PA V2 MAX: 125 CM/SEC
BH CV ECHO MEAS - RAP SYSTOLE: 5 MMHG
BH CV ECHO MEAS - RVDD: 2.2 CM
BH CV ECHO MEAS - RVSP: 25.3 MMHG
BH CV ECHO MEAS - SI(AO): 136.7 ML/M^2
BH CV ECHO MEAS - SI(CUBED): 35.8 ML/M^2
BH CV ECHO MEAS - SI(LVOT): 31.4 ML/M^2
BH CV ECHO MEAS - SI(TEICH): 32.6 ML/M^2
BH CV ECHO MEAS - SV(AO): 239.8 ML
BH CV ECHO MEAS - SV(CUBED): 62.8 ML
BH CV ECHO MEAS - SV(LVOT): 55 ML
BH CV ECHO MEAS - SV(TEICH): 57.2 ML
BH CV ECHO MEAS - TR MAX VEL: 225 CM/SEC
BUN BLD-MCNC: 12 MG/DL (ref 7–21)
BUN/CREAT SERPL: 24.5 (ref 7–25)
CALCIUM SPEC-SCNC: 8.2 MG/DL (ref 8.4–10.2)
CHLORIDE SERPL-SCNC: 105 MMOL/L (ref 95–110)
CO2 SERPL-SCNC: 23 MMOL/L (ref 22–31)
CREAT BLD-MCNC: 0.49 MG/DL (ref 0.5–1)
DEPRECATED RDW RBC AUTO: 44 FL (ref 36.4–46.3)
EOSINOPHIL # BLD AUTO: 0.15 10*3/MM3 (ref 0–0.7)
EOSINOPHIL NFR BLD AUTO: 3.6 % (ref 0–7)
ERYTHROCYTE [DISTWIDTH] IN BLOOD BY AUTOMATED COUNT: 13.4 % (ref 11.5–14.5)
GFR SERPL CREATININE-BSD FRML MDRD: 128 ML/MIN/1.73 (ref 45–104)
GLUCOSE BLD-MCNC: 135 MG/DL (ref 60–100)
GLUCOSE BLDC GLUCOMTR-MCNC: 135 MG/DL (ref 70–130)
GLUCOSE BLDC GLUCOMTR-MCNC: 141 MG/DL (ref 70–130)
GLUCOSE BLDC GLUCOMTR-MCNC: 190 MG/DL (ref 70–130)
HCT VFR BLD AUTO: 37.8 % (ref 35–45)
HGB BLD-MCNC: 12.7 G/DL (ref 12–15.5)
IMM GRANULOCYTES # BLD: 0.05 10*3/MM3 (ref 0–0.02)
IMM GRANULOCYTES NFR BLD: 1.2 % (ref 0–0.5)
LYMPHOCYTES # BLD AUTO: 1.35 10*3/MM3 (ref 0.6–4.2)
LYMPHOCYTES NFR BLD AUTO: 32.4 % (ref 10–50)
MCH RBC QN AUTO: 30.5 PG (ref 26.5–34)
MCHC RBC AUTO-ENTMCNC: 33.6 G/DL (ref 31.4–36)
MCV RBC AUTO: 90.6 FL (ref 80–98)
MONOCYTES # BLD AUTO: 0.43 10*3/MM3 (ref 0–0.9)
MONOCYTES NFR BLD AUTO: 10.3 % (ref 0–12)
NEUTROPHILS # BLD AUTO: 2.17 10*3/MM3 (ref 2–8.6)
NEUTROPHILS NFR BLD AUTO: 52 % (ref 37–80)
PLATELET # BLD AUTO: 201 10*3/MM3 (ref 150–450)
PMV BLD AUTO: 10.6 FL (ref 8–12)
POTASSIUM BLD-SCNC: 4.5 MMOL/L (ref 3.5–5.1)
RBC # BLD AUTO: 4.17 10*6/MM3 (ref 3.77–5.16)
SODIUM BLD-SCNC: 138 MMOL/L (ref 137–145)
WBC NRBC COR # BLD: 4.17 10*3/MM3 (ref 3.2–9.8)

## 2018-09-19 PROCEDURE — G0378 HOSPITAL OBSERVATION PER HR: HCPCS

## 2018-09-19 PROCEDURE — 82962 GLUCOSE BLOOD TEST: CPT

## 2018-09-19 PROCEDURE — 80048 BASIC METABOLIC PNL TOTAL CA: CPT | Performed by: FAMILY MEDICINE

## 2018-09-19 PROCEDURE — 70551 MRI BRAIN STEM W/O DYE: CPT

## 2018-09-19 PROCEDURE — 93880 EXTRACRANIAL BILAT STUDY: CPT

## 2018-09-19 PROCEDURE — 93306 TTE W/DOPPLER COMPLETE: CPT

## 2018-09-19 PROCEDURE — 85025 COMPLETE CBC W/AUTO DIFF WBC: CPT | Performed by: FAMILY MEDICINE

## 2018-09-19 RX ORDER — MECLIZINE HCL 12.5 MG/1
12.5 TABLET ORAL 3 TIMES DAILY PRN
Qty: 90 TABLET | Refills: 0 | Status: SHIPPED | OUTPATIENT
Start: 2018-09-19 | End: 2021-11-15 | Stop reason: SDUPTHER

## 2018-09-19 RX ORDER — ASPIRIN 325 MG
325 TABLET, DELAYED RELEASE (ENTERIC COATED) ORAL DAILY
Status: DISCONTINUED | OUTPATIENT
Start: 2018-09-19 | End: 2018-09-19 | Stop reason: HOSPADM

## 2018-09-19 RX ORDER — LORAZEPAM 0.5 MG/1
1 TABLET ORAL ONCE
Status: DISCONTINUED | OUTPATIENT
Start: 2018-09-19 | End: 2018-09-19

## 2018-09-19 RX ORDER — LORAZEPAM 2 MG/ML
1 INJECTION INTRAMUSCULAR ONCE
Status: DISCONTINUED | OUTPATIENT
Start: 2018-09-19 | End: 2018-09-19 | Stop reason: HOSPADM

## 2018-09-19 RX ADMIN — CARVEDILOL 6.25 MG: 6.25 TABLET, FILM COATED ORAL at 09:40

## 2018-09-19 RX ADMIN — ASPIRIN 325 MG: 325 TABLET, DELAYED RELEASE ORAL at 17:30

## 2018-09-19 RX ADMIN — PRASUGREL HYDROCHLORIDE 10 MG: 10 TABLET, FILM COATED ORAL at 09:39

## 2018-09-19 RX ADMIN — LINAGLIPTIN 5 MG: 5 TABLET, FILM COATED ORAL at 09:40

## 2018-09-19 RX ADMIN — METFORMIN HYDROCHLORIDE 1000 MG: 500 TABLET ORAL at 17:30

## 2018-09-19 RX ADMIN — METFORMIN HYDROCHLORIDE 1000 MG: 500 TABLET ORAL at 09:39

## 2018-09-19 RX ADMIN — RANOLAZINE 500 MG: 500 TABLET, FILM COATED, EXTENDED RELEASE ORAL at 09:40

## 2018-09-19 NOTE — DISCHARGE SUMMARY
[address] [address]  T - [phone]     DISCHARGE SUMMARY         PATIENT  DEMOGRAPHICS   PATIENT NAME: Bonnie Garcia                      PHYSICIAN: Kusum Jarvis MD  : 1955  MRN: [id number]    ADMISSION/DISCHARGE INFO   ADMISSION DATE: 2018   DISCHARGE DATE: 18    ADMISSION DIAGNOSES: Weakness [R53.1]  Transient cerebral ischemia, unspecified type [G45.9]  Transient cerebral ischemia, unspecified type [G45.9]  DISCHARGE DIAGNOSES:     Active Problems:    Coronary artery disease involving native coronary artery of native heart    Hypertension associated with diabetes (CMS/HCC)    Mixed diabetic hyperlipidemia associated with type 2 diabetes mellitus (CMS/HCC)    Type 2 diabetes mellitus with diabetic cardiomyopathy (CMS/[facility])      SERVICE: Family Medicine  ATTENDING PROVIDER: Dr. Hough  RESIDENT: Kusum Jarvis MD     CONSULTS   Consult Orders (all)     Start     Ordered    18 1432  Inpatient Specialist ON CALL (Neuro) Consult  Once     Provider:  (Not yet assigned)    18 1432    18 1253  Consult Interventional Neurologist and/or Stroke Team  Once     Specialty:  Neurology  Provider:  (Not yet assigned)    18 1253    18 1253  Inpatient Consult to Neurology (on call physician/group)  Once     Specialty:  Neurology  Provider:  (Not yet assigned)    18 1253          PROCEDURES     Imaging Results (last 24 hours)     Procedure Component Value Units Date/Time    US Carotid Bilateral [[id number]] Collected:  18 0730     Updated:  18 1229    Narrative:         ULTRASOUND CAROTID DUPLEX SCAN    HISTORY: Weakness. Dizziness. Facial weakness. Transient cerebral  ischemic attack. TIA.    COMPARISON: None.    Duplex ultrasound of the carotid bifurcations was performed.    Real time and color flow images demonstrate bilateral plaque  formation.  No Doppler evidence of significant stenosis.  The  peak systolic velocity in the right internal carotid artery  is 114.5 cm/s.  End-diastolic velocity 38.6 cm/s.  Ratio peak systolic velocity right internal carotid artery to  right common carotid 1.6.  The peak systolic velocity in left internal carotid artery is  98.2 cm/s.  End-diastolic velocity 36.1 cm/s.  Ratio peak systolic velocity left internal carotid artery to left  common carotid artery 1.4.  Antegrade flow is present in each vertebral artery.      Impression:       CONCLUSION:  Less than 50% diameter reduction stenosis each internal carotid  artery.    [id number]    Electronically signed by:  Brad Rogers MD  9/19/2018 12:28 PM  CDT Workstation: [employer]    MRI Brain Without Contrast [[id number]] Collected:  09/19/18 0653     Updated:  09/19/18 1216    Narrative:       Facial muscle weakness and TIA.    MR, brain without intravenous contrast.    Comparison is made with CT dated September 18, 2018.    MRI scan 1.5 Ann.    There is mild atrophy. The supratentorial ventricular system is  normal in size and configuration.  No midline shift is  identified.  No abnormal intensity is seen within the brain.   There is no evidence of acute ischemia, intracranial hemorrhage  or mass.    No abnormality is seen in the posterior fossa. The  pituitary is normal in appearance.     The flow voids are seen in all major arteries.    The visualized sinuses and mastoid cells are clear.       Impression:       CONCLUSION: No acute intracranial abnormality is identified. Mild  atrophy.    Electronically signed by:  Héctor Peterson MD  9/19/2018 12:15  PM CDT Workstation: [id number]          Mri Brain Without Contrast    Result Date: 9/19/2018  Narrative: Facial muscle weakness and TIA. MR, brain without intravenous contrast. Comparison is made with CT dated September 18, 2018. MRI scan 1.5 Ann. There is mild atrophy. The supratentorial ventricular system is normal in size and configuration.  No midline shift is  identified.  No abnormal intensity is seen within the brain. There is no evidence of acute ischemia, intracranial hemorrhage or mass.    No abnormality is seen in the posterior fossa. The pituitary is normal in appearance. The flow voids are seen in all major arteries. The visualized sinuses and mastoid cells are clear.     Impression: CONCLUSION: No acute intracranial abnormality is identified. Mild atrophy. Electronically signed by:  Héctor Peterson MD  9/19/2018 12:15 PM CDT Workstation: [email or web address]    Xr Chest 1 View    Result Date: 9/18/2018  Narrative: PORTABLE CHEST HISTORY: Acute stroke protocol on seven less than 12 hours. Portable AP upright film of the chest was obtained at 1:10 PM. COMPARISON: June 28, 2016 The lungs are clear of an acute process. The heart is not enlarged. The pulmonary vasculature is not increased. No pleural effusion. No pneumothorax. No acute osseous abnormality.     Impression: CONCLUSION: No Acute Disease [id number] Electronically signed by:  Brad Rogers MD  9/18/2018 1:19 PM CDT Workstation: [employer]    Us Carotid Bilateral    Result Date: 9/19/2018  Narrative: ULTRASOUND CAROTID DUPLEX SCAN HISTORY: Weakness. Dizziness. Facial weakness. Transient cerebral ischemic attack. TIA. COMPARISON: None. Duplex ultrasound of the carotid bifurcations was performed. Real time and color flow images demonstrate bilateral plaque formation. No Doppler evidence of significant stenosis. The peak systolic velocity in the right internal carotid artery is 114.5 cm/s. End-diastolic velocity 38.6 cm/s. Ratio peak systolic velocity right internal carotid artery to right common carotid 1.6. The peak systolic velocity in left internal carotid artery is 98.2 cm/s. End-diastolic velocity 36.1 cm/s. Ratio peak systolic velocity left internal carotid artery to left common carotid artery 1.4. Antegrade flow is present in each vertebral artery.     Impression: CONCLUSION: Less than 50% diameter reduction  stenosis each internal carotid artery. [id number] Electronically signed by:  Brad Rogers MD  9/19/2018 12:28 PM CDT Workstation: [employer]    Ct Head Without Contrast Stroke Protocol    Result Date: 9/18/2018  Narrative: CT Head Without Contrast History: Stroke Axial scans of the brain were obtained without intravenous contrast.  Coronal and sagital reconstructions were preformed. This exam was performed according to our departmental dose-optimization program, which includes automated exposure control, adjustment of the mA and/or kV according to patient size and/or use of iterative reconstruction technique. DLP: 885.50 Comparison: August 20, 2017 Bone windows are unremarkable. Mucosal thickening left ethmoid sinus. No acute process. Minimal cerebral and cerebellar atrophy. Minimal periventricular white matter small vessel disease. No hemorrhage. No mass. No abnormal areas of increased attenuation. No midline shift. No abnormal extra-axial fluid collections.     Impression: CONCLUSION: No acute process. Minimal cerebral and cerebellar atrophy. Minimal periventricular white matter small vessel disease. [id number] Electronically signed by:  Brad Rogers MD  9/18/2018 1:17 PM CDT Workstation: [employer]      HISTORY OF PRESENT ILLNESS   Bonnie Garcia is a 63 y.o. female with a CMH of DM type 2, Hyperthyroidism, HTN, and GERD  who presents complaining of  Dizziness and weakness of that occurred 4 hours ago.  Pt states she was at work when noticed a feeling of the room spinning around her. This was preceded by palpations. She states she tried speaking after this event and noticed that her speech was slowed. She also endorses full body weakness, and had numbness in her hands that lasted for 2 hours, and some perioral and right cheek numbness that occurred for 10 minutes. Pt states she had a similar event one year ago. Pt says her symptoms seemed to have improved after getting fluids, just like they did the last time  she had this event. With both occurrences she remembers that she had not been eating beforehand. Pt denies any symptoms of nausea, vomiting, diarrhea, or constipation.    DIAGNOSTIC DATA   Troponin negative.    Neurochecks normal.      HOSPITAL COURSE   Pt is a 63  Y.o. Female who presented to the ED on 9/18/18 due to acute onset dizziness and weakness. She was at work whe nshe had a sensation of the world spinning around her. This was accompanied by full body weakness and numbness in her arms, periorally and on her right cheek.  Pt denied any  LOC.  Pt had resolution of her symptoms by the time she was examined and stated she thought it was due to receiving fluids because she had this same occurrence 1 year ago. Both events were preceded by prolonged time without food. PT had CT Head without contrast and CXR performed in ED and both were negative. She was admitted and orders for MRI brain, US Carotid, and Echo were placed.    On 9/19/18 pt stated she had no adverse events overnight. All her symptoms had improved. Results for MRI, US Carotid, and Echo did not find anything of concern. She was advised to follow up with her PCP after discharge to continue to monitor this condition. PT was cleared for discharge and sent home.     DISCHARGE CONDITION   Stable    DISPOSITION   To Home      DISCHARGE MEDICATIONS        Your medication list      START taking these medications      Instructions Last Dose Given Next Dose Due   meclizine 12.5 MG tablet  Commonly known as:  ANTIVERT      Take 1 tablet by mouth 3 (Three) Times a Day As Needed for dizziness.       metFORMIN 1000 MG tablet  Commonly known as:  GLUCOPHAGE      Take 1 tablet by mouth 2 (Two) Times a Day With Meals.          CHANGE how you take these medications      Instructions Last Dose Given Next Dose Due   rosuvastatin 5 MG tablet  Commonly known as:  CRESTOR  What changed:  · when to take this  · additional instructions      TAKE 1 TABLET BY MOUTH DAILY        vitamin D 39513 units capsule capsule  Commonly known as:  ERGOCALCIFEROL  What changed:  additional instructions      Take 1 capsule by mouth Every 7 (Seven) Days.          CONTINUE taking these medications      Instructions Last Dose Given Next Dose Due   aspirin 81 MG EC tablet      Take 324 mg by mouth every night at bedtime. Pt take 4-81 mg tablet at bedtime.       carvedilol CR 20 MG 24 hr capsule  Commonly known as:  COREG CR      Take 20 mg by mouth Daily.       Empagliflozin-Metformin HCl 12.5-1000 MG tablet  Commonly known as:  SYNJARDY      Take 1 tablet by mouth 2 (Two) Times a Day With Meals.       glucose blood test strip      1 each by Other route As Needed. Use as instructed       losartan 25 MG tablet  Commonly known as:  COZAAR      Take 1 tablet by mouth Daily.       prasugrel 10 MG tablet  Commonly known as:  EFFIENT      Take 10 mg by mouth Daily.       ranolazine 500 MG 12 hr tablet  Commonly known as:  RANEXA      Take 500 mg by mouth 2 (Two) Times a Day.       JANUVIA 100 MG tablet  Generic drug:  SITagliptin      TAKE 1 TABLET BY MOUTH DAILY BEFORE BREAKFAST       VASCEPA 1 g capsule capsule  Generic drug:  icosapent ethyl      Take 1 g by mouth 2 (Two) Times a Day With Meals.       ZETIA 10 MG tablet  Generic drug:  ezetimibe      Take 10 mg by mouth Daily.             Where to Get Your Medications      These medications were sent to [facility] [id number] [place] [address] - [address] [place] [address] AT [facility] [address] [place] - [phone]  - [phone] [address] [address]    Phone:  [phone] ·   meclizine 12.5 MG tablet  · metFORMIN 1000 MG tablet         INSTRUCTIONS   Activity:   Activity Instructions     Activity as Tolerated             Diet:   Diet Instructions     Diet: Regular, Consistent Carbohydrate, Cardiac       Discharge Diet:   Regular  Consistent Carbohydrate  Cardiac             Special Instructions: Patient instructed to call M.D. or return to  ED with worsening shortness of breath, chest pain, fever greater than 100.4°F or any other medical concerns.    FOLLOW UP   Additional Instructions for the Follow-ups that You Need to Schedule     Call MD With Problems / Concerns    As directed      Instructions: Call if symptoms worsen or return    Order Comments:  Instructions: Call if symptoms worsen or return          Discharge Follow-up with PCP    As directed      Currently Documented PCP:  Yudi Quiñones MD  PCP Phone Number:  [phone]    Follow Up Details:  Follow up in 1 week with Family Medicine residency           Follow-up Information     Yudi Quiñones MD .    Specialty:  Family Medicine  Why:  Follow up in 1 week with Family Medicine residency  Contact information:  [address] [place] [address]  [phone]                  PENDING TEST RESULTS AT DISCHARGE      TIME   Time: 30 minutes were spent planning this discharge.          Dr. Hough is the attending at time of discharge, He is aware of the patient's status and agrees with the above discharge summary.           This document has been electronically signed by Kusum Jarvis MD on September 19, 2018 6:40 PM

## 2018-09-19 NOTE — ED NOTES
Contacted Dr. Cardoza for diet orders.  Permission for patient to eat received.     Chuyita Fxo RN  09/18/18 7633

## 2018-09-19 NOTE — DISCHARGE INSTR - APPOINTMENTS
Family Practice   1 week follow up   Office will call with appointment   Please bring medication list   [phone]

## 2018-09-19 NOTE — PLAN OF CARE
Problem: Patient Care Overview  Goal: Plan of Care Review  Outcome: Outcome(s) achieved Date Met: 09/19/18 09/19/18 0419   Coping/Psychosocial   Plan of Care Reviewed With patient   Plan of Care Review   Progress improving   OTHER   Outcome Summary New Admit, Pt VSS, Neuro checks remain intact     Goal: Discharge Needs Assessment  Outcome: Ongoing (interventions implemented as appropriate)      Problem: Stroke (Ischemic) (Adult)  Goal: Signs and Symptoms of Listed Potential Problems Will be Absent, Minimized or Managed (Stroke)  Outcome: Ongoing (interventions implemented as appropriate)

## 2018-09-19 NOTE — PROGRESS NOTES
FAMILY MEDICINE DAILY PROGRESS NOTE  NAME: Bonnie Garcia  : 1955  MRN: [id number]     LOS: 0 days     PROVIDER OF SERVICE: Kusum Jarvis MD    Chief Complaint: Dizziness    Subjective:     Interval History:  History taken from: patient chart  Pt was admitted to our service through the ED yesterday complaining of a 4 hour hx of dizziness preceded by palpatations and weakness.  She had slowed speech and numbness in her hands and face. She has a [rior hx of a similar event 1 year ago. Both events were preceded by decreased food intake. PT states she is doing much better today and has had no recurrences of her symptoms. She is scheduled for an MRI, ECHO< and U/S today. She denies any other complaints of nausea, vomiting, diarrhea, or constipation.    Review of Systems:   Review of Systems   Constitutional: Negative for appetite change, chills, diaphoresis, fatigue and fever.   HENT: Negative for congestion, ear discharge, ear pain, facial swelling, hearing loss, sinus pain, sinus pressure, sneezing and sore throat.    Eyes: Negative for pain, discharge and visual disturbance.   Respiratory: Negative for apnea, cough, chest tightness, shortness of breath, wheezing and stridor.    Cardiovascular: Negative for chest pain, palpitations and leg swelling.   Gastrointestinal: Negative for abdominal distention, abdominal pain, constipation, diarrhea, nausea and vomiting.   Endocrine: Negative for cold intolerance and heat intolerance.   Genitourinary: Negative for dysuria, frequency and urgency.   Musculoskeletal: Negative for arthralgias, back pain, myalgias and neck pain.   Neurological: Negative for dizziness, facial asymmetry, speech difficulty, numbness and headaches.   Psychiatric/Behavioral: Negative for agitation and dysphoric mood. The patient is not nervous/anxious.        Objective:     Vital Signs  Temp:  [97.2 °F (36.2 °C)-99.6 °F (37.6 °C)] 98.2 °F (36.8 °C)  Heart Rate:  [70-90] 75  Resp:  [16-18]  18  BP: (115-165)/(60-79) 127/60    Physical Exam  Physical Exam   Constitutional: She is oriented to person, place, and time. She appears well-developed and well-nourished. No distress.   HENT:   Head: Normocephalic and atraumatic.   Right Ear: External ear normal.   Left Ear: External ear normal.   Eyes: Conjunctivae and EOM are normal. Right eye exhibits no discharge. Left eye exhibits no discharge. No scleral icterus.   Neck: Normal range of motion.   Cardiovascular: Normal rate, regular rhythm and normal heart sounds.  Exam reveals no gallop and no friction rub.    No murmur heard.  Pulmonary/Chest: Effort normal and breath sounds normal. No stridor. No respiratory distress. She has no wheezes. She has no rales.   Abdominal: Soft. Bowel sounds are normal. She exhibits no distension. There is no tenderness.   Musculoskeletal: Normal range of motion. She exhibits no edema or tenderness.   Neurological: She is alert and oriented to person, place, and time.   Skin: Skin is warm and dry. No rash noted. She is not diaphoretic. No erythema.   Psychiatric: She has a normal mood and affect. Her behavior is normal.       Medication Review    Current Facility-Administered Medications:   •  acetaminophen (TYLENOL) tablet 650 mg, 650 mg, Oral, Q4H PRN, Gomez Senior MD  •  carvedilol (COREG) tablet 6.25 mg, 6.25 mg, Oral, Q12H, Gomez Senior MD  •  dextrose (D50W) 25 g/ 50mL Intravenous Solution 25 g, 25 g, Intravenous, Q15 Min PRN, Gomez Senior MD  •  dextrose (GLUTOSE) oral gel 15 g, 15 g, Oral, Q15 Min PRN, Gomez Senior MD  •  docusate sodium (COLACE) capsule 100 mg, 100 mg, Oral, BID PRN, Gomez Senior MD  •  glucagon (human recombinant) (GLUCAGEN DIAGNOSTIC) injection 1 mg, 1 mg, Subcutaneous, PRN, Gomez Senior MD  •  insulin aspart (novoLOG) injection 0-9 Units, 0-9 Units, Subcutaneous, 4x Daily AC & at Bedtime, Gomez Senior MD, Stopped at 09/19/18  0730  •  linagliptin (TRADJENTA) tablet 5 mg, 5 mg, Oral, Daily, Gomez Senior MD  •  LORazepam (ATIVAN) injection 1 mg, 1 mg, Intravenous, Once, Gomez Senior MD  •  losartan (COZAAR) tablet 25 mg, 25 mg, Oral, Daily, Gomez Senior MD  •  meclizine (ANTIVERT) tablet 12.5 mg, 12.5 mg, Oral, TID PRN, Gomez Senior MD  •  metFORMIN (GLUCOPHAGE) tablet 1,000 mg, 1,000 mg, Oral, BID With Meals, Gomez Senior MD  •  ondansetron (ZOFRAN) injection 4 mg, 4 mg, Intravenous, Q6H PRN, Gomez Senior MD  •  prasugrel (EFFIENT) tablet 10 mg, 10 mg, Oral, Daily, Gomez Senior MD  •  ranolazine (RANEXA) 12 hr tablet 500 mg, 500 mg, Oral, Q12H, Gomez Senior MD, 500 mg at 09/18/18 2212  •  rosuvastatin (CRESTOR) tablet 5 mg, 5 mg, Oral, Daily, Gomez Senior MD  •  sodium chloride 0.9 % flush 1-10 mL, 1-10 mL, Intravenous, PRN, Gomez Senior MD  •  sodium chloride 0.9 % flush 10 mL, 10 mL, Intravenous, PRN, Dmitri Barnard MD     Diagnostic Data    Lab Results (last 24 hours)     Procedure Component Value Units Date/Time    Basic Metabolic Panel [[id number]]  (Abnormal) Collected:  09/19/18 0609    Specimen:  Blood Updated:  09/19/18 0705     Glucose 135 (H) mg/dL      BUN 12 mg/dL      Creatinine 0.49 (L) mg/dL      Sodium 138 mmol/L      Potassium 4.5 mmol/L      Chloride 105 mmol/L      CO2 23.0 mmol/L      Calcium 8.2 (L) mg/dL      eGFR Non African Amer 128 (H) mL/min/1.73      BUN/Creatinine Ratio 24.5     Anion Gap 10.0 mmol/L     CBC Auto Differential [[id number]]  (Abnormal) Collected:  09/19/18 0608    Specimen:  Blood Updated:  09/19/18 0634     WBC 4.17 10*3/mm3      RBC 4.17 10*6/mm3      Hemoglobin 12.7 g/dL      Hematocrit 37.8 %      MCV 90.6 fL      MCH 30.5 pg      MCHC 33.6 g/dL      RDW 13.4 %      RDW-SD 44.0 fl      MPV 10.6 fL      Platelets 201 10*3/mm3      Neutrophil % 52.0 %      Lymphocyte % 32.4 %      Monocyte %  10.3 %      Eosinophil % 3.6 %      Basophil % 0.5 %      Immature Grans % 1.2 (H) %      Neutrophils, Absolute 2.17 10*3/mm3      Lymphocytes, Absolute 1.35 10*3/mm3      Monocytes, Absolute 0.43 10*3/mm3      Eosinophils, Absolute 0.15 10*3/mm3      Basophils, Absolute 0.02 10*3/mm3      Immature Grans, Absolute 0.05 (H) 10*3/mm3     POC Glucose Once [[id number]]  (Abnormal) Collected:  09/19/18 0608    Specimen:  Blood Updated:  09/19/18 0626     Glucose 135 (H) mg/dL      Comment: RN NotifiedOperator: [id number] [facility] ID: [id number]       Lipid Panel [[id number]]  (Abnormal) Collected:  09/18/18 1325    Specimen:  Blood Updated:  09/18/18 2110     Total Cholesterol 124 mg/dL      Triglycerides 387 (H) mg/dL      HDL Cholesterol 38 (L) mg/dL      LDL Cholesterol  49 mg/dL      LDL/HDL Ratio 0.23    TSH [[id number]]  (Normal) Collected:  09/18/18 1325    Specimen:  Blood Updated:  09/18/18 1431     TSH 1.860 mIU/mL     [place] [facility] [[id number]] Collected:  09/18/18 1325    Specimen:  Blood Updated:  09/18/18 1430    Narrative:       The following orders were created for panel order [place] Draw.  Procedure                               Abnormality         Status                     ---------                               -----------         ------                     Light Blue Top[[id number]]                                   Final result               Green Top (Gel)[[id number]]                                  Final result               Lavender Top[[id number]]                                     Final result               Gold Top - SST[[id number]]                                   Final result                 Please view results for these tests on the individual orders.    Light Blue Top [[id number]] Collected:  09/18/18 1325    Specimen:  Blood Updated:  09/18/18 1430     Extra Tube hold for add-on     Comment: Auto resulted       Green Top (Gel) [[id number]] Collected:  09/18/18 1325    Specimen:   Blood Updated:  09/18/18 1430     Extra Tube Hold for add-ons.     Comment: Auto resulted.       Lavender Top [[id number]] Collected:  09/18/18 1325    Specimen:  Blood Updated:  09/18/18 1430     Extra Tube hold for add-on     Comment: Auto resulted       Gold Top - SST [[id number]] Collected:  09/18/18 1325    Specimen:  Blood Updated:  09/18/18 1430     Extra Tube Hold for add-ons.     Comment: Auto resulted.       T4, Free [[id number]]  (Normal) Collected:  09/18/18 1325    Specimen:  Blood Updated:  09/18/18 1418     Free T4 1.06 ng/dL     Troponin [[id number]]  (Normal) Collected:  09/18/18 1325    Specimen:  Blood Updated:  09/18/18 1400     Troponin I <0.012 ng/mL     Protime-INR [[id number]]  (Normal) Collected:  09/18/18 1325    Specimen:  Blood Updated:  09/18/18 1348     Protime 12.1 Seconds      INR 0.91    Narrative:       Therapeutic range for most indications is 2.0-3.0 INR,  or 2.5-3.5 for mechanical heart valves.    aPTT [[id number]]  (Normal) Collected:  09/18/18 1325    Specimen:  Blood Updated:  09/18/18 1348     PTT 24.5 seconds     Narrative:       The recommended Heparin therapeutic range is 68-97 seconds.    Comprehensive Metabolic Panel [[id number]]  (Abnormal) Collected:  09/18/18 1325    Specimen:  Blood Updated:  09/18/18 1347     Glucose 158 (H) mg/dL      BUN 13 mg/dL      Creatinine 0.45 (L) mg/dL      Sodium 137 mmol/L      Potassium 4.3 mmol/L      Chloride 101 mmol/L      CO2 21.0 (L) mmol/L      Calcium 9.0 mg/dL      Total Protein 6.7 g/dL      Albumin 4.20 g/dL      ALT (SGPT) 27 U/L      AST (SGOT) 25 U/L      Alkaline Phosphatase 113 U/L      Total Bilirubin 0.7 mg/dL      eGFR Non African Amer 141 (H) mL/min/1.73      Globulin 2.5 gm/dL      A/G Ratio 1.7 g/dL      BUN/Creatinine Ratio 28.9 (H)     Anion Gap 15.0 mmol/L     CBC & Differential [[id number]] Collected:  09/18/18 1325    Specimen:  Blood Updated:  09/18/18 1334    Narrative:       The following orders were created for  panel order CBC & Differential.  Procedure                               Abnormality         Status                     ---------                               -----------         ------                     CBC Auto Differential[[id number]]        Abnormal            Final result                 Please view results for these tests on the individual orders.    CBC Auto Differential [[id number]]  (Abnormal) Collected:  09/18/18 1325    Specimen:  Blood Updated:  09/18/18 1334     WBC 5.95 10*3/mm3      RBC 4.40 10*6/mm3      Hemoglobin 13.6 g/dL      Hematocrit 39.8 %      MCV 90.5 fL      MCH 30.9 pg      MCHC 34.2 g/dL      RDW 13.4 %      RDW-SD 44.1 fl      MPV 10.6 fL      Platelets 206 10*3/mm3      Neutrophil % 62.7 %      Lymphocyte % 24.9 %      Monocyte % 8.2 %      Eosinophil % 2.5 %      Basophil % 0.5 %      Immature Grans % 1.2 (H) %      Neutrophils, Absolute 3.73 10*3/mm3      Lymphocytes, Absolute 1.48 10*3/mm3      Monocytes, Absolute 0.49 10*3/mm3      Eosinophils, Absolute 0.15 10*3/mm3      Basophils, Absolute 0.03 10*3/mm3      Immature Grans, Absolute 0.07 (H) 10*3/mm3     POC Glucose Once [[id number]]  (Abnormal) Collected:  09/18/18 1311    Specimen:  Blood Updated:  09/18/18 1329     Glucose 164 (H) mg/dL      Comment: RN NotifiedOperator: [id number] DENEEN Walker ID: [id number]              Imaging Results (last 24 hours)     Procedure Component Value Units Date/Time    US Carotid Bilateral [[id number]] Updated:  09/19/18 0800    XR Chest 1 View [[id number]] Collected:  09/18/18 1318     Updated:  09/18/18 1536    Narrative:         PORTABLE CHEST    HISTORY: Acute stroke protocol on seven less than 12 hours.    Portable AP upright film of the chest was obtained at 1:10 PM.  COMPARISON: June 28, 2016    The lungs are clear of an acute process.  The heart is not enlarged.  The pulmonary vasculature is not increased.  No pleural effusion.  No pneumothorax.  No acute osseous abnormality.       Impression:       CONCLUSION:  No Acute Disease    [id number]    Electronically signed by:  Brad Rogers MD  9/18/2018 1:19 PM CDT  Workstation: [email or web address]    CT Head Without Contrast Stroke Protocol [[id number]] Collected:  09/18/18 1248     Updated:  09/18/18 1319    Narrative:         CT Head Without Contrast    History: Stroke    Axial scans of the brain were obtained without intravenous  contrast.  Coronal and sagital reconstructions were preformed.    This exam was performed according to our departmental  dose-optimization program, which includes automated exposure  control, adjustment of the mA and/or kV according to patient size  and/or use of iterative reconstruction technique.    DLP: 885.50    Comparison: August 20, 2017    Bone windows are unremarkable.  Mucosal thickening left ethmoid sinus.    No acute process.  Minimal cerebral and cerebellar atrophy.  Minimal periventricular white matter small vessel disease.  No hemorrhage.  No mass.  No abnormal areas of increased attenuation.  No midline shift.  No abnormal extra-axial fluid collections.      Impression:       CONCLUSION:  No acute process.  Minimal cerebral and cerebellar atrophy.  Minimal periventricular white matter small vessel disease.    [id number]    Electronically signed by:  Brad Rogers MD  9/18/2018 1:17 PM CDT  Workstation: [email or web address]          I reviewed the patient's new clinical results.    Assessment/Plan:     Hospital Problem List     * (Principal)Dizziness    Overview Signed 9/18/2018  4:39 PM by Gomez Senior MD     Likely secondary to Vertigo. SOC consult evaluated the patient and report low likelihood of stroke. Recommended TIA workup.  - Ordered MRI brain, Carotid U/S, ECHO, and Lipid panel  - Continue to monitor on telemetry.   - q4h neurochecks.            Coronary artery disease involving native coronary artery of native heart    Overview Signed 9/18/2018  4:36 PM by Gomez Senior MD     Continue home dose  of Effient and ASA.   -Continue home Coreg.          Hypertension associated with diabetes (CMS/[facility])    Overview Signed 9/18/2018  4:36 PM by Gomez Senior MD     Monitor BP per protocol.  - Continue Coreg and Losartan.          Mixed diabetic hyperlipidemia associated with type 2 diabetes mellitus (CMS/[facility])    Overview Signed 9/18/2018  4:33 PM by Gomez Senior MD     - Continue Home Crestor.  - I will hold Zetia as this is not on formulary.   - I will recheck a lipid panel.          Type 2 diabetes mellitus with diabetic cardiomyopathy (CMS/[facility])    Overview Signed 9/18/2018  4:33 PM by Gomez Senior MD     - Finger stick glucose checks.  - SSI  - I will order Jardiance 12.5 BID and Metformin 1000 mg BID as we do not have home dose of Synjardy on formulary.   - I will order Linagliptin 5 mg as we do not have home dose of Januvia.                    DVT prophylaxis: SCDs/TEDs  Code Status and Medical Interventions:   Ordered at: 09/18/18 2616     Level Of Support Discussed With:    Patient     Code Status:    CPR     Medical Interventions (Level of Support Prior to Arrest):    Full       Plan for disposition:Where: home and When:  1-2days      Time: 15 minutes           This document has been electronically signed by Kusum Jarvis MD on September 19, 2018 8:08 AM

## 2018-09-20 ENCOUNTER — READMISSION MANAGEMENT (OUTPATIENT)
Dept: CALL CENTER | Facility: HOSPITAL | Age: 63
End: 2018-09-20

## 2018-09-20 NOTE — OUTREACH NOTE
Prep Survey      Responses   Facility patient discharged from?  [place]   Is patient eligible?  Yes   Discharge diagnosis  Dizziness    Does the patient have one of the following disease processes/diagnoses(primary or secondary)?  Other   Does the patient have Home health ordered?  No   Is there a DME ordered?  No   Comments regarding appointments  See AVS   Prep survey completed?  Yes          Catherine Rowley RN

## 2018-09-24 ENCOUNTER — READMISSION MANAGEMENT (OUTPATIENT)
Dept: CALL CENTER | Facility: HOSPITAL | Age: 63
End: 2018-09-24

## 2018-09-24 ENCOUNTER — TELEPHONE (OUTPATIENT)
Dept: FAMILY MEDICINE CLINIC | Facility: CLINIC | Age: 63
End: 2018-09-24

## 2018-09-24 NOTE — OUTREACH NOTE
Medical Week 1 Survey      Responses   Facility patient discharged from?  [place]   Does the patient have one of the following disease processes/diagnoses(primary or secondary)?  Other   Is there a successful TCM telephone encounter documented?  No   Week 1 attempt successful?  No   Unsuccessful attempts  Attempt 1          Nayeli Christensen RN

## 2018-09-25 ENCOUNTER — READMISSION MANAGEMENT (OUTPATIENT)
Dept: CALL CENTER | Facility: HOSPITAL | Age: 63
End: 2018-09-25

## 2018-09-25 NOTE — OUTREACH NOTE
Medical Week 1 Survey      Responses   Facility patient discharged from?  [place]   Does the patient have one of the following disease processes/diagnoses(primary or secondary)?  Other   Is there a successful TCM telephone encounter documented?  No   Week 1 attempt successful?  No   Unsuccessful attempts  Attempt 2          Beverly Ibrahim RN

## 2018-09-26 ENCOUNTER — READMISSION MANAGEMENT (OUTPATIENT)
Dept: CALL CENTER | Facility: HOSPITAL | Age: 63
End: 2018-09-26

## 2018-09-27 ENCOUNTER — HOSPITAL ENCOUNTER (EMERGENCY)
Facility: HOSPITAL | Age: 63
Discharge: ANOTHER HOSPITAL | End: 2018-09-27
Attending: EMERGENCY MEDICINE | Admitting: EMERGENCY MEDICINE

## 2018-09-27 ENCOUNTER — APPOINTMENT (OUTPATIENT)
Dept: GENERAL RADIOLOGY | Facility: HOSPITAL | Age: 63
End: 2018-09-27

## 2018-09-27 ENCOUNTER — APPOINTMENT (OUTPATIENT)
Dept: CT IMAGING | Facility: HOSPITAL | Age: 63
End: 2018-09-27

## 2018-09-27 VITALS
OXYGEN SATURATION: 97 % | HEART RATE: 78 BPM | BODY MASS INDEX: 28.17 KG/M2 | DIASTOLIC BLOOD PRESSURE: 80 MMHG | WEIGHT: 159 LBS | HEIGHT: 63 IN | TEMPERATURE: 98.1 F | RESPIRATION RATE: 18 BRPM | SYSTOLIC BLOOD PRESSURE: 146 MMHG

## 2018-09-27 DIAGNOSIS — G45.9 TRANSIENT CEREBRAL ISCHEMIA, UNSPECIFIED TYPE: Primary | ICD-10-CM

## 2018-09-27 LAB
ALBUMIN SERPL-MCNC: 4.4 G/DL (ref 3.4–4.8)
ALBUMIN/GLOB SERPL: 1.6 G/DL (ref 1.1–1.8)
ALP SERPL-CCNC: 110 U/L (ref 38–126)
ALT SERPL W P-5'-P-CCNC: 30 U/L (ref 9–52)
ANION GAP SERPL CALCULATED.3IONS-SCNC: 19 MMOL/L (ref 5–15)
AST SERPL-CCNC: 25 U/L (ref 14–36)
BASOPHILS # BLD AUTO: 0.02 10*3/MM3 (ref 0–0.2)
BASOPHILS NFR BLD AUTO: 0.3 % (ref 0–2)
BILIRUB SERPL-MCNC: 0.7 MG/DL (ref 0.2–1.3)
BUN BLD-MCNC: 14 MG/DL (ref 7–21)
BUN/CREAT SERPL: 29.8 (ref 7–25)
CALCIUM SPEC-SCNC: 9.3 MG/DL (ref 8.4–10.2)
CHLORIDE SERPL-SCNC: 97 MMOL/L (ref 95–110)
CO2 SERPL-SCNC: 22 MMOL/L (ref 22–31)
CREAT BLD-MCNC: 0.47 MG/DL (ref 0.5–1)
DEPRECATED RDW RBC AUTO: 44.7 FL (ref 36.4–46.3)
EOSINOPHIL # BLD AUTO: 0.12 10*3/MM3 (ref 0–0.7)
EOSINOPHIL NFR BLD AUTO: 1.8 % (ref 0–7)
ERYTHROCYTE [DISTWIDTH] IN BLOOD BY AUTOMATED COUNT: 13.7 % (ref 11.5–14.5)
GFR SERPL CREATININE-BSD FRML MDRD: 134 ML/MIN/1.73 (ref 45–104)
GLOBULIN UR ELPH-MCNC: 2.8 GM/DL (ref 2.3–3.5)
GLUCOSE BLD-MCNC: 151 MG/DL (ref 60–100)
GLUCOSE BLDC GLUCOMTR-MCNC: 131 MG/DL (ref 70–130)
HCT VFR BLD AUTO: 42.4 % (ref 35–45)
HGB BLD-MCNC: 14.7 G/DL (ref 12–15.5)
HOLD SPECIMEN: NORMAL
HOLD SPECIMEN: NORMAL
IMM GRANULOCYTES # BLD: 0.04 10*3/MM3 (ref 0–0.02)
IMM GRANULOCYTES NFR BLD: 0.6 % (ref 0–0.5)
INR PPP: 0.87 (ref 0.8–1.2)
LYMPHOCYTES # BLD AUTO: 1.33 10*3/MM3 (ref 0.6–4.2)
LYMPHOCYTES NFR BLD AUTO: 19.9 % (ref 10–50)
MCH RBC QN AUTO: 31.5 PG (ref 26.5–34)
MCHC RBC AUTO-ENTMCNC: 34.7 G/DL (ref 31.4–36)
MCV RBC AUTO: 91 FL (ref 80–98)
MONOCYTES # BLD AUTO: 0.44 10*3/MM3 (ref 0–0.9)
MONOCYTES NFR BLD AUTO: 6.6 % (ref 0–12)
NEUTROPHILS # BLD AUTO: 4.75 10*3/MM3 (ref 2–8.6)
NEUTROPHILS NFR BLD AUTO: 70.8 % (ref 37–80)
PLATELET # BLD AUTO: 203 10*3/MM3 (ref 150–450)
PMV BLD AUTO: 10.4 FL (ref 8–12)
POTASSIUM BLD-SCNC: 4.7 MMOL/L (ref 3.5–5.1)
PROT SERPL-MCNC: 7.2 G/DL (ref 6.3–8.6)
PROTHROMBIN TIME: 11.7 SECONDS (ref 11.1–15.3)
RBC # BLD AUTO: 4.66 10*6/MM3 (ref 3.77–5.16)
SODIUM BLD-SCNC: 138 MMOL/L (ref 137–145)
WBC NRBC COR # BLD: 6.7 10*3/MM3 (ref 3.2–9.8)
WHOLE BLOOD HOLD SPECIMEN: NORMAL
WHOLE BLOOD HOLD SPECIMEN: NORMAL

## 2018-09-27 PROCEDURE — 70450 CT HEAD/BRAIN W/O DYE: CPT

## 2018-09-27 PROCEDURE — 99284 EMERGENCY DEPT VISIT MOD MDM: CPT

## 2018-09-27 PROCEDURE — 93005 ELECTROCARDIOGRAM TRACING: CPT | Performed by: EMERGENCY MEDICINE

## 2018-09-27 PROCEDURE — 93010 ELECTROCARDIOGRAM REPORT: CPT | Performed by: INTERNAL MEDICINE

## 2018-09-27 PROCEDURE — 85610 PROTHROMBIN TIME: CPT | Performed by: EMERGENCY MEDICINE

## 2018-09-27 PROCEDURE — 85025 COMPLETE CBC W/AUTO DIFF WBC: CPT | Performed by: EMERGENCY MEDICINE

## 2018-09-27 PROCEDURE — 71045 X-RAY EXAM CHEST 1 VIEW: CPT

## 2018-09-27 PROCEDURE — 82962 GLUCOSE BLOOD TEST: CPT

## 2018-09-27 PROCEDURE — 80053 COMPREHEN METABOLIC PANEL: CPT | Performed by: EMERGENCY MEDICINE

## 2018-09-27 RX ORDER — SODIUM CHLORIDE 0.9 % (FLUSH) 0.9 %
10 SYRINGE (ML) INJECTION AS NEEDED
Status: DISCONTINUED | OUTPATIENT
Start: 2018-09-27 | End: 2018-09-27 | Stop reason: HOSPADM

## 2018-09-27 RX ORDER — ASPIRIN 325 MG
325 TABLET ORAL ONCE
Status: COMPLETED | OUTPATIENT
Start: 2018-09-27 | End: 2018-09-27

## 2018-09-27 RX ADMIN — ASPIRIN 325 MG: 325 TABLET ORAL at 15:07

## 2018-09-29 ENCOUNTER — READMISSION MANAGEMENT (OUTPATIENT)
Dept: CALL CENTER | Facility: HOSPITAL | Age: 63
End: 2018-09-29

## 2018-09-29 NOTE — OUTREACH NOTE
Medical Week 1 Survey      Responses   Facility patient discharged from?  [place]   Does the patient have one of the following disease processes/diagnoses(primary or secondary)?  Other   Is there a successful TCM telephone encounter documented?  No   Week 1 attempt successful?  No   Unsuccessful attempts  Attempt 4          Audra Robles RN

## 2018-10-01 RX ORDER — EZETIMIBE 10 MG/1
TABLET ORAL
Qty: 30 TABLET | Refills: 0 | Status: SHIPPED | OUTPATIENT
Start: 2018-10-01 | End: 2018-10-28 | Stop reason: SDUPTHER

## 2018-10-01 RX ORDER — SITAGLIPTIN 100 MG/1
TABLET, FILM COATED ORAL
Qty: 30 TABLET | Refills: 0 | Status: SHIPPED | OUTPATIENT
Start: 2018-10-01 | End: 2018-10-28 | Stop reason: SDUPTHER

## 2018-10-01 RX ORDER — ROSUVASTATIN CALCIUM 5 MG/1
5 TABLET, COATED ORAL DAILY
Qty: 30 TABLET | Refills: 0 | Status: SHIPPED | OUTPATIENT
Start: 2018-10-01 | End: 2018-10-28 | Stop reason: SDUPTHER

## 2018-10-09 ENCOUNTER — READMISSION MANAGEMENT (OUTPATIENT)
Dept: CALL CENTER | Facility: HOSPITAL | Age: 63
End: 2018-10-09

## 2018-10-09 NOTE — OUTREACH NOTE
Medical Week 2 Survey      Responses   Facility patient discharged from?  [place]   Does the patient have one of the following disease processes/diagnoses(primary or secondary)?  Other   Week 2 attempt successful?  No   Rescheduled  Revoked   Revoke  Readmitted [transferred to [facility] from [facility] ED 9/27]          Alba Garnica RN

## 2018-10-29 RX ORDER — SITAGLIPTIN 100 MG/1
TABLET, FILM COATED ORAL
Qty: 30 TABLET | Refills: 0 | Status: SHIPPED | OUTPATIENT
Start: 2018-10-29 | End: 2018-11-26 | Stop reason: SDUPTHER

## 2018-10-29 RX ORDER — ROSUVASTATIN CALCIUM 5 MG/1
5 TABLET, COATED ORAL DAILY
Qty: 30 TABLET | Refills: 0 | Status: SHIPPED | OUTPATIENT
Start: 2018-10-29 | End: 2018-11-26 | Stop reason: SDUPTHER

## 2018-10-29 RX ORDER — EZETIMIBE 10 MG/1
TABLET ORAL
Qty: 30 TABLET | Refills: 0 | Status: SHIPPED | OUTPATIENT
Start: 2018-10-29 | End: 2018-11-26 | Stop reason: SDUPTHER

## 2018-11-26 RX ORDER — ROSUVASTATIN CALCIUM 5 MG/1
5 TABLET, COATED ORAL DAILY
Qty: 30 TABLET | Refills: 0 | Status: SHIPPED | OUTPATIENT
Start: 2018-11-26 | End: 2019-01-05 | Stop reason: SDUPTHER

## 2018-11-26 RX ORDER — SITAGLIPTIN 100 MG/1
TABLET, FILM COATED ORAL
Qty: 30 TABLET | Refills: 0 | Status: SHIPPED | OUTPATIENT
Start: 2018-11-26 | End: 2018-12-18

## 2018-11-26 RX ORDER — EZETIMIBE 10 MG/1
TABLET ORAL
Qty: 30 TABLET | Refills: 0 | Status: SHIPPED | OUTPATIENT
Start: 2018-11-26 | End: 2019-01-05 | Stop reason: SDUPTHER

## 2018-12-14 ENCOUNTER — APPOINTMENT (OUTPATIENT)
Dept: LAB | Facility: HOSPITAL | Age: 63
End: 2018-12-14

## 2018-12-14 LAB
25(OH)D3 SERPL-MCNC: 49.8 NG/ML (ref 30–100)
ALBUMIN SERPL-MCNC: 4.4 G/DL (ref 3.4–4.8)
ALBUMIN UR-MCNC: 1.1 MG/L
ALBUMIN/GLOB SERPL: 1.8 G/DL (ref 1.1–1.8)
ALP SERPL-CCNC: 98 U/L (ref 38–126)
ALT SERPL W P-5'-P-CCNC: 22 U/L (ref 9–52)
ANION GAP SERPL CALCULATED.3IONS-SCNC: 11 MMOL/L (ref 5–15)
ARTICHOKE IGE QN: 90 MG/DL (ref 1–129)
AST SERPL-CCNC: 59 U/L (ref 14–36)
BASOPHILS # BLD AUTO: 0.02 10*3/MM3 (ref 0–0.2)
BASOPHILS NFR BLD AUTO: 0.4 % (ref 0–2)
BILIRUB SERPL-MCNC: 1 MG/DL (ref 0.2–1.3)
BUN BLD-MCNC: 11 MG/DL (ref 7–21)
BUN/CREAT SERPL: 21.2 (ref 7–25)
CALCIUM SPEC-SCNC: 9.4 MG/DL (ref 8.4–10.2)
CHLORIDE SERPL-SCNC: 100 MMOL/L (ref 95–110)
CHOLEST SERPL-MCNC: 152 MG/DL (ref 0–199)
CO2 SERPL-SCNC: 28 MMOL/L (ref 22–31)
CREAT BLD-MCNC: 0.52 MG/DL (ref 0.5–1)
CREAT UR-MCNC: 40.3 MG/DL
DEPRECATED RDW RBC AUTO: 43.4 FL (ref 36.4–46.3)
EOSINOPHIL # BLD AUTO: 0.16 10*3/MM3 (ref 0–0.7)
EOSINOPHIL NFR BLD AUTO: 3.2 % (ref 0–7)
ERYTHROCYTE [DISTWIDTH] IN BLOOD BY AUTOMATED COUNT: 13.3 % (ref 11.5–14.5)
GFR SERPL CREATININE-BSD FRML MDRD: 119 ML/MIN/1.73 (ref 45–104)
GLOBULIN UR ELPH-MCNC: 2.5 GM/DL (ref 2.3–3.5)
GLUCOSE BLD-MCNC: 175 MG/DL (ref 60–100)
HBA1C MFR BLD: 7.1 % (ref 4–5.6)
HCT VFR BLD AUTO: 42.6 % (ref 35–45)
HDLC SERPL-MCNC: 47 MG/DL (ref 60–200)
HGB BLD-MCNC: 14.5 G/DL (ref 12–15.5)
IMM GRANULOCYTES # BLD: 0.03 10*3/MM3 (ref 0–0.02)
IMM GRANULOCYTES NFR BLD: 0.6 % (ref 0–0.5)
LDLC/HDLC SERPL: 1.17 {RATIO} (ref 0–3.22)
LYMPHOCYTES # BLD AUTO: 1.1 10*3/MM3 (ref 0.6–4.2)
LYMPHOCYTES NFR BLD AUTO: 22 % (ref 10–50)
MCH RBC QN AUTO: 30.9 PG (ref 26.5–34)
MCHC RBC AUTO-ENTMCNC: 34 G/DL (ref 31.4–36)
MCV RBC AUTO: 90.6 FL (ref 80–98)
MICROALBUMIN/CREAT UR: 27.3 MG/G (ref 0–30)
MONOCYTES # BLD AUTO: 0.32 10*3/MM3 (ref 0–0.9)
MONOCYTES NFR BLD AUTO: 6.4 % (ref 0–12)
NEUTROPHILS # BLD AUTO: 3.37 10*3/MM3 (ref 2–8.6)
NEUTROPHILS NFR BLD AUTO: 67.4 % (ref 37–80)
PLATELET # BLD AUTO: 239 10*3/MM3 (ref 150–450)
PMV BLD AUTO: 10.8 FL (ref 8–12)
POTASSIUM BLD-SCNC: 4.6 MMOL/L (ref 3.5–5.1)
PROT SERPL-MCNC: 6.9 G/DL (ref 6.3–8.6)
RBC # BLD AUTO: 4.7 10*6/MM3 (ref 3.77–5.16)
SODIUM BLD-SCNC: 139 MMOL/L (ref 137–145)
TRIGL SERPL-MCNC: 249 MG/DL (ref 20–199)
TSH SERPL DL<=0.05 MIU/L-ACNC: 2.02 MIU/ML (ref 0.46–4.68)
VIT B12 BLD-MCNC: 282 PG/ML (ref 239–931)
WBC NRBC COR # BLD: 5 10*3/MM3 (ref 3.2–9.8)

## 2018-12-14 PROCEDURE — 82607 VITAMIN B-12: CPT | Performed by: INTERNAL MEDICINE

## 2018-12-14 PROCEDURE — 82043 UR ALBUMIN QUANTITATIVE: CPT | Performed by: INTERNAL MEDICINE

## 2018-12-14 PROCEDURE — 36415 COLL VENOUS BLD VENIPUNCTURE: CPT | Performed by: INTERNAL MEDICINE

## 2018-12-14 PROCEDURE — 85025 COMPLETE CBC W/AUTO DIFF WBC: CPT | Performed by: INTERNAL MEDICINE

## 2018-12-14 PROCEDURE — 80053 COMPREHEN METABOLIC PANEL: CPT | Performed by: INTERNAL MEDICINE

## 2018-12-14 PROCEDURE — 82570 ASSAY OF URINE CREATININE: CPT | Performed by: INTERNAL MEDICINE

## 2018-12-14 PROCEDURE — 84443 ASSAY THYROID STIM HORMONE: CPT | Performed by: INTERNAL MEDICINE

## 2018-12-14 PROCEDURE — 83036 HEMOGLOBIN GLYCOSYLATED A1C: CPT | Performed by: INTERNAL MEDICINE

## 2018-12-14 PROCEDURE — 82306 VITAMIN D 25 HYDROXY: CPT | Performed by: INTERNAL MEDICINE

## 2018-12-14 PROCEDURE — 80061 LIPID PANEL: CPT | Performed by: INTERNAL MEDICINE

## 2018-12-18 ENCOUNTER — OFFICE VISIT (OUTPATIENT)
Dept: ENDOCRINOLOGY | Facility: CLINIC | Age: 63
End: 2018-12-18

## 2018-12-18 VITALS
BODY MASS INDEX: 27.98 KG/M2 | OXYGEN SATURATION: 98 % | WEIGHT: 157.9 LBS | HEIGHT: 63 IN | DIASTOLIC BLOOD PRESSURE: 82 MMHG | SYSTOLIC BLOOD PRESSURE: 136 MMHG | HEART RATE: 80 BPM

## 2018-12-18 DIAGNOSIS — E55.9 VITAMIN D DEFICIENCY: ICD-10-CM

## 2018-12-18 DIAGNOSIS — I15.2 HYPERTENSION ASSOCIATED WITH DIABETES (HCC): ICD-10-CM

## 2018-12-18 DIAGNOSIS — E78.2 MIXED DIABETIC HYPERLIPIDEMIA ASSOCIATED WITH TYPE 2 DIABETES MELLITUS (HCC): ICD-10-CM

## 2018-12-18 DIAGNOSIS — E11.69 TYPE 2 DIABETES MELLITUS WITH DIABETIC CARDIOMYOPATHY (HCC): Primary | ICD-10-CM

## 2018-12-18 DIAGNOSIS — E11.59 HYPERTENSION ASSOCIATED WITH DIABETES (HCC): ICD-10-CM

## 2018-12-18 DIAGNOSIS — I43 TYPE 2 DIABETES MELLITUS WITH DIABETIC CARDIOMYOPATHY (HCC): Primary | ICD-10-CM

## 2018-12-18 DIAGNOSIS — E78.00 HYPERCHOLESTEROLEMIA: ICD-10-CM

## 2018-12-18 DIAGNOSIS — I25.10 CORONARY ARTERY DISEASE INVOLVING NATIVE CORONARY ARTERY OF NATIVE HEART, ANGINA PRESENCE UNSPECIFIED: ICD-10-CM

## 2018-12-18 DIAGNOSIS — E53.8 B12 DEFICIENCY: ICD-10-CM

## 2018-12-18 DIAGNOSIS — G57.12 MERALGIA PARAESTHETICA, LEFT: ICD-10-CM

## 2018-12-18 DIAGNOSIS — E11.69 MIXED DIABETIC HYPERLIPIDEMIA ASSOCIATED WITH TYPE 2 DIABETES MELLITUS (HCC): ICD-10-CM

## 2018-12-18 PROCEDURE — 99214 OFFICE O/P EST MOD 30 MIN: CPT | Performed by: INTERNAL MEDICINE

## 2018-12-18 RX ORDER — GREEN TEA/HOODIA GORDONII 315-12.5MG
CAPSULE ORAL
Qty: 15 TABLET | Refills: 11
Start: 2018-12-18 | End: 2023-03-13

## 2018-12-18 NOTE — PROGRESS NOTES
Bonnie Garcia is a 63 y.o. female who presents for  evaluation of   Chief Complaint   Patient presents with   • Diabetes         Primary Care / Referring Provider  Provider, No Known      Duration/Timing:  Diabetes mellitus type 2, Age at onset of diabetes: 58 years, Onset of symptoms gradual  Timing - constant  Quality - controlled  Severity - high since associated CAD    alleviating factors - degree of understanding/education    Severity (Complications/Hospitalizations)  Secondary Macrovascular Complications:  CAD, No CVA, No PAD  has 50 to 50% LAD     PTCI , stent x 5    2 in proximal LAD    1 in circumflex    may need heart surgery   Secondary Microvascular Complications:  No Diabetic Nephropathy, No Diabetic Retinopathy, No Diabetic Neuropathy    Context  Diabetes Regimen:  Oral Medications  Blood Glucose Readings  at goal  Diet  variable carb intake  Exercise:  Does not exercise    Associated Signs/Symptoms  Hyperglycemic Symptoms:  No polyuria, No polydipsia, No polyphagia, No blurred vision, Weight gain, Weight loss/gain amount 12 lbs lbs since last appointment  Hypoglycemic Episodes:  No documented hypoglycemia  Also h o thyroid nodule  location - right  timing constant  not progressive  severity - low -  bx , Sept 2008 - benign   biochemically euthyroid , off synthroid but used to be on 25 mcgs daily     thyroid US personally reviewed    Sept 2008    1.8 x 1.7 x 1.4 , right , solid , heterogeneous, hypoechoic.     Repeat US June 2013    r solid nodule stable in size, echogenicity consistent w Hashimoto's       Past Medical History:   Diagnosis Date   • Chicken pox    • Coronary arteriosclerosis    • Diabetes mellitus (CMS/HCC)     TYPE 2   • Diastasis of muscle    • Fibrocystic breast    • GERD (gastroesophageal reflux disease)    • H/O Hashimoto thyroiditis    • History of echocardiogram 01/01/2007    ef 35-40% diastolic dysfunction   • History of mammogram 04/13/2015   • History of myocardial  infarction    • History of Papanicolaou smear of cervix 2004    negative   • Hyperlipidemia    • Hypertension    • Measles    • Mild concentric left ventricular hypertrophy (LVH)    • Thyroid nodule    • Vitamin D deficiency      Family History   Problem Relation Age of Onset   • Diabetes Mother    • Coronary artery disease Mother    • Heart attack Father          at 53 multiple mi's   • Coronary artery disease Other    • Diabetes Other    • Thyroid disease Other    • Hypothyroidism Daughter      Social History     Tobacco Use   • Smoking status: Former Smoker     Types: Cigarettes   • Smokeless tobacco: Never Used   • Tobacco comment: smoked for 5 years   Substance Use Topics   • Alcohol use: No   • Drug use: No         Current Outpatient Medications:   •  aspirin 81 MG EC tablet, Take 324 mg by mouth every night at bedtime. Pt take 4-81 mg tablet at bedtime., Disp: , Rfl:   •  carvedilol CR (COREG CR) 20 MG 24 hr capsule, Take 20 mg by mouth Daily., Disp: , Rfl:   •  Empagliflozin-Metformin HCl (SYNJARDY) 12.5-1000 MG tablet, Take 1 tablet by mouth 2 (Two) Times a Day With Meals., Disp: 60 tablet, Rfl: 11  •  ezetimibe (ZETIA) 10 MG tablet, TAKE 1 TABLET BY MOUTH DAILY, Disp: 30 tablet, Rfl: 0  •  glucose blood test strip, 1 each by Other route As Needed. Use as instructed, Disp: , Rfl:   •  icosapent ethyl (VASCEPA) 1 G capsule capsule, Take 1 g by mouth 2 (Two) Times a Day With Meals., Disp: , Rfl:   •  JANUVIA 100 MG tablet, TAKE 1 TABLET BY MOUTH DAILY BEFORE BREAKFAST, Disp: 30 tablet, Rfl: 0  •  losartan (COZAAR) 25 MG tablet, Take 1 tablet by mouth Daily., Disp: , Rfl: 0  •  meclizine (ANTIVERT) 12.5 MG tablet, Take 1 tablet by mouth 3 (Three) Times a Day As Needed for dizziness., Disp: 90 tablet, Rfl: 0  •  prasugrel (EFFIENT) 10 MG tablet, Take 10 mg by mouth Daily., Disp: , Rfl:   •  ranolazine (RANEXA) 500 MG 12 hr tablet, Take 500 mg by mouth 2 (Two) Times a Day., Disp: , Rfl:   •   rosuvastatin (CRESTOR) 5 MG tablet, TAKE 1 TABLET BY MOUTH DAILY, Disp: 30 tablet, Rfl: 0  •  vitamin D (ERGOCALCIFEROL) 60685 units capsule capsule, Take 1 capsule by mouth Every 7 (Seven) Days. (Patient taking differently: Take 50,000 Units by mouth Every 7 (Seven) Days. Takes on Sundays), Disp: 12 capsule, Rfl: 3    Review of Systems    Review of Systems   Constitutional: Negative for activity change, appetite change, chills, diaphoresis, fatigue, fever and unexpected weight change.   HENT: Negative for congestion, dental problem, drooling, ear discharge, ear pain, facial swelling, mouth sores, postnasal drip, rhinorrhea, sinus pressure, sore throat, tinnitus, trouble swallowing and voice change.    Eyes: Negative for photophobia, pain, discharge, redness, itching and visual disturbance.   Respiratory: Negative for apnea, cough, choking, chest tightness, shortness of breath, wheezing and stridor.    Cardiovascular: Negative for chest pain, palpitations and leg swelling.   Gastrointestinal: Negative for abdominal distention, abdominal pain, constipation, diarrhea, nausea and vomiting.   Endocrine: Negative for cold intolerance, heat intolerance, polydipsia, polyphagia and polyuria.   Genitourinary: Negative for decreased urine volume, difficulty urinating, dysuria, flank pain, frequency, hematuria and urgency.   Musculoskeletal: Positive for myalgias. Negative for arthralgias, back pain, gait problem, joint swelling, neck pain and neck stiffness.   Skin: Negative for color change, pallor, rash and wound.   Allergic/Immunologic: Negative for immunocompromised state.   Neurological: Negative for dizziness, tremors, seizures, syncope, facial asymmetry, speech difficulty, weakness, light-headedness, numbness and headaches.   Hematological: Negative for adenopathy.   Psychiatric/Behavioral: Negative for agitation, behavioral problems, confusion, decreased concentration, dysphoric mood, hallucinations, self-injury,  "sleep disturbance and suicidal ideas. The patient is not nervous/anxious and is not hyperactive.         Objective:     /82   Pulse 80   Ht 160 cm (63\")   Wt 71.6 kg (157 lb 14.4 oz)   SpO2 98%   BMI 27.97 kg/m²     Physical Exam   Constitutional: She is oriented to person, place, and time. She appears well-developed.   HENT:   Head: Normocephalic.   Right Ear: External ear normal.   Left Ear: External ear normal.   Nose: Nose normal.   Eyes: Conjunctivae and EOM are normal. No scleral icterus.   Neck: Normal range of motion. Neck supple. No tracheal deviation present. No thyromegaly present.   Cardiovascular: Normal rate, regular rhythm, normal heart sounds and intact distal pulses. Exam reveals no gallop and no friction rub.   No murmur heard.  Pulmonary/Chest: Effort normal and breath sounds normal. No stridor. No respiratory distress. She has no wheezes. She has no rales. She exhibits no tenderness.   Abdominal: Soft. Bowel sounds are normal. She exhibits no distension and no mass. There is no tenderness. There is no rebound and no guarding.   Musculoskeletal: Normal range of motion. She exhibits no tenderness or deformity.   Lymphadenopathy:     She has no cervical adenopathy.   Neurological: She is alert and oriented to person, place, and time. She displays normal reflexes. She exhibits normal muscle tone. Coordination normal.   Skin: No rash noted. No erythema. No pallor.   Psychiatric: She has a normal mood and affect. Her behavior is normal. Judgment and thought content normal.       Lab Review    Results for orders placed or performed during the hospital encounter of 09/27/18   Comprehensive Metabolic Panel   Result Value Ref Range    Glucose 151 (H) 60 - 100 mg/dL    BUN 14 7 - 21 mg/dL    Creatinine 0.47 (L) 0.50 - 1.00 mg/dL    Sodium 138 137 - 145 mmol/L    Potassium 4.7 3.5 - 5.1 mmol/L    Chloride 97 95 - 110 mmol/L    CO2 22.0 22.0 - 31.0 mmol/L    Calcium 9.3 8.4 - 10.2 mg/dL    Total " Protein 7.2 6.3 - 8.6 g/dL    Albumin 4.40 3.40 - 4.80 g/dL    ALT (SGPT) 30 9 - 52 U/L    AST (SGOT) 25 14 - 36 U/L    Alkaline Phosphatase 110 38 - 126 U/L    Total Bilirubin 0.7 0.2 - 1.3 mg/dL    eGFR Non  Amer 134 (H) 45 - 104 mL/min/1.73    Globulin 2.8 2.3 - 3.5 gm/dL    A/G Ratio 1.6 1.1 - 1.8 g/dL    BUN/Creatinine Ratio 29.8 (H) 7.0 - 25.0    Anion Gap 19.0 (H) 5.0 - 15.0 mmol/L   Protime-INR   Result Value Ref Range    Protime 11.7 11.1 - 15.3 Seconds    INR 0.87 0.80 - 1.20   CBC Auto Differential   Result Value Ref Range    WBC 6.70 3.20 - 9.80 10*3/mm3    RBC 4.66 3.77 - 5.16 10*6/mm3    Hemoglobin 14.7 12.0 - 15.5 g/dL    Hematocrit 42.4 35.0 - 45.0 %    MCV 91.0 80.0 - 98.0 fL    MCH 31.5 26.5 - 34.0 pg    MCHC 34.7 31.4 - 36.0 g/dL    RDW 13.7 11.5 - 14.5 %    RDW-SD 44.7 36.4 - 46.3 fl    MPV 10.4 8.0 - 12.0 fL    Platelets 203 150 - 450 10*3/mm3    Neutrophil % 70.8 37.0 - 80.0 %    Lymphocyte % 19.9 10.0 - 50.0 %    Monocyte % 6.6 0.0 - 12.0 %    Eosinophil % 1.8 0.0 - 7.0 %    Basophil % 0.3 0.0 - 2.0 %    Immature Grans % 0.6 (H) 0.0 - 0.5 %    Neutrophils, Absolute 4.75 2.00 - 8.60 10*3/mm3    Lymphocytes, Absolute 1.33 0.60 - 4.20 10*3/mm3    Monocytes, Absolute 0.44 0.00 - 0.90 10*3/mm3    Eosinophils, Absolute 0.12 0.00 - 0.70 10*3/mm3    Basophils, Absolute 0.02 0.00 - 0.20 10*3/mm3    Immature Grans, Absolute 0.04 (H) 0.00 - 0.02 10*3/mm3   POC Glucose Once   Result Value Ref Range    Glucose 131 (H) 70 - 130 mg/dL   Light Blue Top   Result Value Ref Range    Extra Tube hold for add-on    Green Top (Gel)   Result Value Ref Range    Extra Tube Hold for add-ons.    Lavender Top   Result Value Ref Range    Extra Tube hold for add-on    Gold Top - SST   Result Value Ref Range    Extra Tube Hold for add-ons.            Assessment/Plan       ICD-10-CM ICD-9-CM   1. Type 2 diabetes mellitus with diabetic cardiomyopathy (CMS/HCC) E11.69 250.80    I43 425.7   2. Hypertension associated with  diabetes (CMS/[facility]) E11.59 250.80    I10 401.9   3. Mixed diabetic hyperlipidemia associated with type 2 diabetes mellitus (CMS/[facility]) E11.69 250.80    E78.2 272.2   4. Vitamin D deficiency E55.9 268.9   5. B12 deficiency E53.8 266.2   6. Coronary artery disease involving native coronary artery of native heart, angina presence unspecified I25.10 414.01   7. Meralgia paraesthetica, left G57.12 355.1         Glycemic Management:      Lab Results   Component Value Date    HGBA1C 7.1 (H) 12/14/2018    HGBA1C 6.7 (H) 06/14/2018    HGBA1C 6.8 (H) 01/11/2018     Lab Results   Component Value Date    MICROALBUR 1.1 12/14/2018    CREATININE 0.52 12/14/2018         Diabetes ----    On synjardy and januvia    We will change     ozempic 0.5 mg weekly and synjardy but XR 12.5/1000 , 2 tabs w bkfast       Lipid Management  Lab Results   Component Value Date    CHOL 152 12/14/2018    CHOL 124 09/18/2018    CHOL 141 06/14/2018     Lab Results   Component Value Date    TRIG 249 (H) 12/14/2018    TRIG 387 (H) 09/18/2018    TRIG 221 (H) 06/14/2018     Lab Results   Component Value Date    HDL 47 (L) 12/14/2018    HDL 38 (L) 09/18/2018    HDL 38 (L) 06/14/2018       Lab Results   Component Value Date    LDL 90 12/14/2018    LDL 49 09/18/2018    LDL 68 06/14/2018         LDL elevated    can't handle tricor    on vascepa ( intolerant to lovaza )    niaspan 500 mg ER  taking - not approved and not needed     Tried  crestor 5 mg but developed myalgias in 2015 but able to tolerate only every other day    has tried lipitor, zocor, livalo, fluvastatin since 2010 and retried in 2017  but hasn't been able to tolerate due to myalgias     Presently only on zetia 10 mg daily     Add repatha     Blood Pressure Management:  Target blood pressure less than 130/80 mm/hg, Not on Ace Inhibitor/ARB, Control of blood pressure  on coreg cr 20 mg daily and  losartan 25 mg   was on benicar ,  off ACEi due to cough    edema left > r , use compression  stockings    No loner a problem   Microvascular Complication Monitoring:  No Microalbuminuria, Date of last Microalbumin Assessment 10/08/2015, No Diabetic Retinopathy, No Diabetic     +  neuropathy   Meralgia paresthetica  Start neurontin 300 mg tid had to back off to 100 mg po bid - can't tolerate   no nephropathy    mild microalbuminuria  Immunizations:  Last influenza immunization 2015, Patient prefers not to receive pneumococcal immunization  Preventive Care:  Patient is not smoking  Weight Related      Add contrave to lifestyle changes - felt she was going to die    Add belviq, side effects      Bone Health  Vit D 50 th weekly and levels are nl   Other Diabetes Related Aspects  Right Thyroid nodule      R nodule , dimensions in HPI , stable from 2008 to 2013     she has Hashimoto's proven on US and + TPO ab, euthyroid June 2013  used to be on Lt4 at 25 mcgs but d/c years ago  follow TSH - nl     Lab Results   Component Value Date    TSH 2.020 12/14/2018         --    10-15  nl B12 but low normal, start b12     having some unsteadiness and numbness    Lab Results   Component Value Date    [id number] 282 12/14/2018     Please take in addition to MVI      I reviewed and summarized records from Provider, No Known from 2017 and I reviewed / ordered labs.     No orders of the defined types were placed in this encounter.        A copy of my note was sent to Provider, No Known    Please see my above opinion and suggestions.

## 2018-12-27 RX ORDER — EZETIMIBE 10 MG/1
TABLET ORAL
Qty: 30 TABLET | Refills: 0 | OUTPATIENT
Start: 2018-12-27

## 2018-12-27 RX ORDER — SITAGLIPTIN 100 MG/1
TABLET, FILM COATED ORAL
Qty: 30 TABLET | Refills: 0 | OUTPATIENT
Start: 2018-12-27

## 2018-12-27 RX ORDER — ROSUVASTATIN CALCIUM 5 MG/1
5 TABLET, COATED ORAL DAILY
Qty: 30 TABLET | Refills: 0 | OUTPATIENT
Start: 2018-12-27

## 2019-01-05 RX ORDER — EZETIMIBE 10 MG/1
10 TABLET ORAL DAILY
Qty: 30 TABLET | Refills: 11 | Status: SHIPPED | OUTPATIENT
Start: 2019-01-05 | End: 2020-01-28 | Stop reason: SDUPTHER

## 2019-01-05 RX ORDER — ROSUVASTATIN CALCIUM 5 MG/1
5 TABLET, COATED ORAL DAILY
Qty: 30 TABLET | Refills: 11 | Status: SHIPPED | OUTPATIENT
Start: 2019-01-05 | End: 2021-02-20

## 2019-01-23 RX ORDER — ERGOCALCIFEROL 1.25 MG/1
CAPSULE ORAL
Qty: 12 CAPSULE | Refills: 0 | OUTPATIENT
Start: 2019-01-23

## 2019-02-04 ENCOUNTER — TELEPHONE (OUTPATIENT)
Dept: ENDOCRINOLOGY | Facility: CLINIC | Age: 64
End: 2019-02-04

## 2019-02-04 NOTE — TELEPHONE ENCOUNTER
[facility] on pt's Repatha Pens.  Please call [phone] to give an oral prescription or fax to [phone].  Thank you!

## 2019-02-18 ENCOUNTER — TELEPHONE (OUTPATIENT)
Dept: ENDOCRINOLOGY | Facility: CLINIC | Age: 64
End: 2019-02-18

## 2019-04-10 RX ORDER — ERGOCALCIFEROL 1.25 MG/1
CAPSULE ORAL
Qty: 12 CAPSULE | Refills: 0 | Status: SHIPPED | OUTPATIENT
Start: 2019-04-10 | End: 2019-08-08 | Stop reason: SDUPTHER

## 2019-04-10 RX ORDER — SITAGLIPTIN 100 MG/1
TABLET, FILM COATED ORAL
Qty: 30 TABLET | Refills: 0 | Status: SHIPPED | OUTPATIENT
Start: 2019-04-10 | End: 2019-05-07 | Stop reason: SDUPTHER

## 2019-05-07 RX ORDER — SITAGLIPTIN 100 MG/1
TABLET, FILM COATED ORAL
Qty: 30 TABLET | Refills: 0 | Status: SHIPPED | OUTPATIENT
Start: 2019-05-07 | End: 2019-06-13 | Stop reason: SDUPTHER

## 2019-06-13 RX ORDER — SITAGLIPTIN 100 MG/1
TABLET, FILM COATED ORAL
Qty: 30 TABLET | Refills: 0 | Status: SHIPPED | OUTPATIENT
Start: 2019-06-13 | End: 2019-07-26 | Stop reason: SDUPTHER

## 2019-06-17 ENCOUNTER — APPOINTMENT (OUTPATIENT)
Dept: LAB | Facility: HOSPITAL | Age: 64
End: 2019-06-17

## 2019-06-17 LAB
25(OH)D3 SERPL-MCNC: 31 NG/ML (ref 30–100)
ALBUMIN SERPL-MCNC: 4.1 G/DL (ref 3.5–5.2)
ALBUMIN UR-MCNC: <1.2 MG/L
ALBUMIN/GLOB SERPL: 1.6 G/DL
ALP SERPL-CCNC: 106 U/L (ref 39–117)
ALT SERPL W P-5'-P-CCNC: 11 U/L (ref 1–33)
ANION GAP SERPL CALCULATED.3IONS-SCNC: 14.3 MMOL/L
ARTICHOKE IGE QN: 117 MG/DL (ref 0–100)
AST SERPL-CCNC: 9 U/L (ref 1–32)
BASOPHILS # BLD AUTO: 0.05 10*3/MM3 (ref 0–0.2)
BASOPHILS NFR BLD AUTO: 0.7 % (ref 0–1.5)
BILIRUB SERPL-MCNC: 0.5 MG/DL (ref 0.2–1.2)
BUN BLD-MCNC: 12 MG/DL (ref 8–23)
BUN/CREAT SERPL: 21.4 (ref 7–25)
CALCIUM SPEC-SCNC: 9.3 MG/DL (ref 8.6–10.5)
CHLORIDE SERPL-SCNC: 104 MMOL/L (ref 98–107)
CHOLEST SERPL-MCNC: 196 MG/DL (ref 0–200)
CO2 SERPL-SCNC: 23.7 MMOL/L (ref 22–29)
CREAT BLD-MCNC: 0.56 MG/DL (ref 0.57–1)
CREAT UR-MCNC: 53.8 MG/DL
DEPRECATED RDW RBC AUTO: 42.5 FL (ref 37–54)
EOSINOPHIL # BLD AUTO: 0.13 10*3/MM3 (ref 0–0.4)
EOSINOPHIL NFR BLD AUTO: 1.9 % (ref 0.3–6.2)
ERYTHROCYTE [DISTWIDTH] IN BLOOD BY AUTOMATED COUNT: 12.9 % (ref 12.3–15.4)
GFR SERPL CREATININE-BSD FRML MDRD: 109 ML/MIN/1.73
GLOBULIN UR ELPH-MCNC: 2.6 GM/DL
GLUCOSE BLD-MCNC: 187 MG/DL (ref 65–99)
HBA1C MFR BLD: 6.9 % (ref 4.8–5.6)
HCT VFR BLD AUTO: 43.1 % (ref 34–46.6)
HDLC SERPL-MCNC: 32 MG/DL (ref 40–60)
HGB BLD-MCNC: 14.3 G/DL (ref 12–15.9)
IMM GRANULOCYTES # BLD AUTO: 0.03 10*3/MM3 (ref 0–0.05)
IMM GRANULOCYTES NFR BLD AUTO: 0.4 % (ref 0–0.5)
LDLC SERPL CALC-MCNC: ABNORMAL MG/DL (ref 0–100)
LDLC/HDLC SERPL: ABNORMAL {RATIO}
LYMPHOCYTES # BLD AUTO: 1.57 10*3/MM3 (ref 0.7–3.1)
LYMPHOCYTES NFR BLD AUTO: 22.9 % (ref 19.6–45.3)
MCH RBC QN AUTO: 30.1 PG (ref 26.6–33)
MCHC RBC AUTO-ENTMCNC: 33.2 G/DL (ref 31.5–35.7)
MCV RBC AUTO: 90.7 FL (ref 79–97)
MICROALBUMIN/CREAT UR: NORMAL MG/G
MONOCYTES # BLD AUTO: 0.57 10*3/MM3 (ref 0.1–0.9)
MONOCYTES NFR BLD AUTO: 8.3 % (ref 5–12)
NEUTROPHILS # BLD AUTO: 4.51 10*3/MM3 (ref 1.7–7)
NEUTROPHILS NFR BLD AUTO: 65.8 % (ref 42.7–76)
NRBC BLD AUTO-RTO: 0 /100 WBC (ref 0–0.2)
PLATELET # BLD AUTO: 260 10*3/MM3 (ref 140–450)
PMV BLD AUTO: 11.5 FL (ref 6–12)
POTASSIUM BLD-SCNC: 4.6 MMOL/L (ref 3.5–5.2)
PROT SERPL-MCNC: 6.7 G/DL (ref 6–8.5)
RBC # BLD AUTO: 4.75 10*6/MM3 (ref 3.77–5.28)
SODIUM BLD-SCNC: 142 MMOL/L (ref 136–145)
TRIGL SERPL-MCNC: 401 MG/DL (ref 0–150)
TSH SERPL DL<=0.05 MIU/L-ACNC: 2.37 MIU/ML (ref 0.27–4.2)
VIT B12 BLD-MCNC: >2000 PG/ML (ref 211–946)
VLDLC SERPL-MCNC: ABNORMAL MG/DL (ref 5–40)
WBC NRBC COR # BLD: 6.86 10*3/MM3 (ref 3.4–10.8)

## 2019-06-17 PROCEDURE — 84443 ASSAY THYROID STIM HORMONE: CPT | Performed by: INTERNAL MEDICINE

## 2019-06-17 PROCEDURE — 80061 LIPID PANEL: CPT | Performed by: INTERNAL MEDICINE

## 2019-06-17 PROCEDURE — 80053 COMPREHEN METABOLIC PANEL: CPT | Performed by: INTERNAL MEDICINE

## 2019-06-17 PROCEDURE — 82607 VITAMIN B-12: CPT | Performed by: INTERNAL MEDICINE

## 2019-06-17 PROCEDURE — 83721 ASSAY OF BLOOD LIPOPROTEIN: CPT | Performed by: INTERNAL MEDICINE

## 2019-06-17 PROCEDURE — 36415 COLL VENOUS BLD VENIPUNCTURE: CPT | Performed by: INTERNAL MEDICINE

## 2019-06-17 PROCEDURE — 82043 UR ALBUMIN QUANTITATIVE: CPT | Performed by: INTERNAL MEDICINE

## 2019-06-17 PROCEDURE — 82306 VITAMIN D 25 HYDROXY: CPT | Performed by: INTERNAL MEDICINE

## 2019-06-17 PROCEDURE — 85025 COMPLETE CBC W/AUTO DIFF WBC: CPT | Performed by: INTERNAL MEDICINE

## 2019-06-17 PROCEDURE — 83036 HEMOGLOBIN GLYCOSYLATED A1C: CPT | Performed by: INTERNAL MEDICINE

## 2019-06-17 PROCEDURE — 82570 ASSAY OF URINE CREATININE: CPT | Performed by: INTERNAL MEDICINE

## 2019-07-29 RX ORDER — SITAGLIPTIN 100 MG/1
TABLET, FILM COATED ORAL
Qty: 30 TABLET | Refills: 0 | Status: SHIPPED | OUTPATIENT
Start: 2019-07-29 | End: 2019-09-03

## 2019-08-09 ENCOUNTER — OFFICE VISIT (OUTPATIENT)
Dept: ENDOCRINOLOGY | Facility: CLINIC | Age: 64
End: 2019-08-09

## 2019-08-09 VITALS
WEIGHT: 156.6 LBS | SYSTOLIC BLOOD PRESSURE: 132 MMHG | DIASTOLIC BLOOD PRESSURE: 80 MMHG | HEART RATE: 83 BPM | OXYGEN SATURATION: 98 % | BODY MASS INDEX: 27.75 KG/M2 | HEIGHT: 63 IN

## 2019-08-09 DIAGNOSIS — I25.10 CORONARY ARTERY DISEASE INVOLVING NATIVE CORONARY ARTERY OF NATIVE HEART, ANGINA PRESENCE UNSPECIFIED: ICD-10-CM

## 2019-08-09 DIAGNOSIS — G57.12 MERALGIA PARAESTHETICA, LEFT: ICD-10-CM

## 2019-08-09 DIAGNOSIS — E11.69 MIXED DIABETIC HYPERLIPIDEMIA ASSOCIATED WITH TYPE 2 DIABETES MELLITUS (HCC): ICD-10-CM

## 2019-08-09 DIAGNOSIS — I15.2 HYPERTENSION ASSOCIATED WITH DIABETES (HCC): ICD-10-CM

## 2019-08-09 DIAGNOSIS — E53.8 B12 DEFICIENCY: ICD-10-CM

## 2019-08-09 DIAGNOSIS — E78.2 MIXED DIABETIC HYPERLIPIDEMIA ASSOCIATED WITH TYPE 2 DIABETES MELLITUS (HCC): ICD-10-CM

## 2019-08-09 DIAGNOSIS — E11.69 TYPE 2 DIABETES MELLITUS WITH DIABETIC CARDIOMYOPATHY (HCC): Primary | ICD-10-CM

## 2019-08-09 DIAGNOSIS — I43 TYPE 2 DIABETES MELLITUS WITH DIABETIC CARDIOMYOPATHY (HCC): Primary | ICD-10-CM

## 2019-08-09 DIAGNOSIS — E55.9 VITAMIN D DEFICIENCY: ICD-10-CM

## 2019-08-09 DIAGNOSIS — E11.59 HYPERTENSION ASSOCIATED WITH DIABETES (HCC): ICD-10-CM

## 2019-08-09 DIAGNOSIS — E78.00 HYPERCHOLESTEROLEMIA: ICD-10-CM

## 2019-08-09 PROCEDURE — 99214 OFFICE O/P EST MOD 30 MIN: CPT | Performed by: INTERNAL MEDICINE

## 2019-08-09 RX ORDER — ERGOCALCIFEROL 1.25 MG/1
CAPSULE ORAL
Qty: 12 CAPSULE | Refills: 0 | Status: SHIPPED | OUTPATIENT
Start: 2019-08-09 | End: 2019-09-03

## 2019-08-09 NOTE — PROGRESS NOTES
Bonnie Garcia is a 64 y.o. female who presents for  evaluation of   Chief Complaint   Patient presents with   • Diabetes         Primary Care / Referring Provider  Provider, No Known      Duration/Timing:  Diabetes mellitus type 2, Age at onset of diabetes: 58 years, Onset of symptoms gradual  Timing - constant  Quality - controlled  Severity - high since associated CAD    alleviating factors - degree of understanding/education    Severity (Complications/Hospitalizations)  Secondary Macrovascular Complications:  CAD, No CVA, No PAD  has 50 to 50% LAD     PTCI , stent x 5    2 in proximal LAD    1 in circumflex    may need heart surgery   Secondary Microvascular Complications:  No Diabetic Nephropathy, No Diabetic Retinopathy, No Diabetic Neuropathy    Context  Diabetes Regimen:  Oral Medications  Blood Glucose Readings  at goal  Diet  variable carb intake  Exercise:  Does not exercise    Associated Signs/Symptoms  Hyperglycemic Symptoms:  No polyuria, No polydipsia, No polyphagia, No blurred vision, Weight gain, Weight loss/gain amount 12 lbs lbs since last appointment  Hypoglycemic Episodes:  No documented hypoglycemia  Also h o thyroid nodule  location - right  timing constant  not progressive  severity - low -  bx , Sept 2008 - benign   biochemically euthyroid , off synthroid but used to be on 25 mcgs daily     thyroid US personally reviewed    Sept 2008    1.8 x 1.7 x 1.4 , right , solid , heterogeneous, hypoechoic.     Repeat US June 2013    r solid nodule stable in size, echogenicity consistent w Hashimoto's       Past Medical History:   Diagnosis Date   • Chicken pox    • Coronary arteriosclerosis    • Diabetes mellitus (CMS/HCC)     TYPE 2   • Diastasis of muscle    • Fibrocystic breast    • GERD (gastroesophageal reflux disease)    • H/O Hashimoto thyroiditis    • History of echocardiogram 01/01/2007    ef 35-40% diastolic dysfunction   • History of mammogram 04/13/2015   • History of myocardial  infarction    • History of Papanicolaou smear of cervix 2004    negative   • Hyperlipidemia    • Hypertension    • Measles    • Mild concentric left ventricular hypertrophy (LVH)    • Thyroid nodule    • Vitamin D deficiency      Family History   Problem Relation Age of Onset   • Diabetes Mother    • Coronary artery disease Mother    • Heart attack Father          at 53 multiple mi's   • Coronary artery disease Other    • Diabetes Other    • Thyroid disease Other    • Hypothyroidism Daughter      Social History     Tobacco Use   • Smoking status: Former Smoker     Types: Cigarettes   • Smokeless tobacco: Never Used   • Tobacco comment: smoked for 5 years   Substance Use Topics   • Alcohol use: No   • Drug use: No         Current Outpatient Medications:   •  aspirin 81 MG EC tablet, Take 324 mg by mouth every night at bedtime. Pt take 4-81 mg tablet at bedtime., Disp: , Rfl:   •  carvedilol CR (COREG CR) 20 MG 24 hr capsule, Take 20 mg by mouth Daily., Disp: , Rfl:   •  Cyanocobalamin (B-12) 500 MCG sublingual tablet, 500 mcgs under the tongue every other day, Disp: 15 tablet, Rfl: 11  •  Empagliflozin-Metformin HCl ER (SYNJARDY XR) 12.5-1000 MG tablet sustained-release 24 hour, Take 2 tablet/day by mouth Daily With Breakfast., Disp: 60 tablet, Rfl: 11  •  ezetimibe (ZETIA) 10 MG tablet, Take 1 tablet by mouth Daily., Disp: 30 tablet, Rfl: 11  •  glucose blood test strip, 1 each by Other route As Needed. Use as instructed, Disp: , Rfl:   •  icosapent ethyl (VASCEPA) 1 G capsule capsule, Take 1 g by mouth 2 (Two) Times a Day With Meals., Disp: , Rfl:   •  JANUVIA 100 MG tablet, TAKE 1 TABLET BY MOUTH DAILY BEFORE BREAKFAST, Disp: 30 tablet, Rfl: 0  •  losartan (COZAAR) 25 MG tablet, Take 1 tablet by mouth Daily., Disp: , Rfl: 0  •  meclizine (ANTIVERT) 12.5 MG tablet, Take 1 tablet by mouth 3 (Three) Times a Day As Needed for dizziness., Disp: 90 tablet, Rfl: 0  •  prasugrel (EFFIENT) 10 MG tablet, Take  10 mg by mouth Daily., Disp: , Rfl:   •  ranolazine (RANEXA) 500 MG 12 hr tablet, Take 500 mg by mouth 2 (Two) Times a Day., Disp: , Rfl:   •  rosuvastatin (CRESTOR) 5 MG tablet, Take 1 tablet by mouth Daily., Disp: 30 tablet, Rfl: 11  •  vitamin D (ERGOCALCIFEROL) 22368 units capsule capsule, TAKE 1 CAPSULE BY MOUTH EVERY 7 DAYS, Disp: 12 capsule, Rfl: 0  •  Evolocumab 140 MG/ML solution auto-injector, Inject 140 mg (1 syringe) under the skin into the appropriate area as directed Every 14 (Fourteen) Days as directed., Disp: 2 mL, Rfl: 10  •  Semaglutide (OZEMPIC) 0.25 or 0.5 MG/DOSE solution pen-injector, Inject 0.5 mg under the skin into the appropriate area as directed 1 (One) Time Per Week. 0.5 mg weekly, Disp: 1 pen, Rfl: 11    Review of Systems    Review of Systems   Constitutional: Negative for activity change, appetite change, chills, diaphoresis, fatigue, fever and unexpected weight change.   HENT: Negative for congestion, dental problem, drooling, ear discharge, ear pain, facial swelling, mouth sores, postnasal drip, rhinorrhea, sinus pressure, sore throat, tinnitus, trouble swallowing and voice change.    Eyes: Negative for photophobia, pain, discharge, redness, itching and visual disturbance.   Respiratory: Negative for apnea, cough, choking, chest tightness, shortness of breath, wheezing and stridor.    Cardiovascular: Negative for chest pain, palpitations and leg swelling.   Gastrointestinal: Negative for abdominal distention, abdominal pain, constipation, diarrhea, nausea and vomiting.   Endocrine: Negative for cold intolerance, heat intolerance, polydipsia, polyphagia and polyuria.   Genitourinary: Negative for decreased urine volume, difficulty urinating, dysuria, flank pain, frequency, hematuria and urgency.   Musculoskeletal: Positive for myalgias. Negative for arthralgias, back pain, gait problem, joint swelling, neck pain and neck stiffness.   Skin: Negative for color change, pallor, rash and  "wound.   Allergic/Immunologic: Negative for immunocompromised state.   Neurological: Negative for dizziness, tremors, seizures, syncope, facial asymmetry, speech difficulty, weakness, light-headedness, numbness and headaches.   Hematological: Negative for adenopathy.   Psychiatric/Behavioral: Negative for agitation, behavioral problems, confusion, decreased concentration, dysphoric mood, hallucinations, self-injury, sleep disturbance and suicidal ideas. The patient is not nervous/anxious and is not hyperactive.         Objective:     /80   Pulse 83   Ht 160 cm (63\")   Wt 71 kg (156 lb 9.6 oz)   SpO2 98%   BMI 27.74 kg/m²     Physical Exam   Constitutional: She is oriented to person, place, and time. She appears well-developed.   HENT:   Head: Normocephalic.   Right Ear: External ear normal.   Left Ear: External ear normal.   Nose: Nose normal.   Eyes: Conjunctivae and EOM are normal. No scleral icterus.   Neck: Normal range of motion. Neck supple. No tracheal deviation present. No thyromegaly present.   Cardiovascular: Normal rate, regular rhythm, normal heart sounds and intact distal pulses. Exam reveals no gallop and no friction rub.   No murmur heard.  Pulmonary/Chest: Effort normal and breath sounds normal. No stridor. No respiratory distress. She has no wheezes. She has no rales. She exhibits no tenderness.   Abdominal: Soft. Bowel sounds are normal. She exhibits no distension and no mass. There is no tenderness. There is no rebound and no guarding.   Musculoskeletal: Normal range of motion. She exhibits no tenderness or deformity.   Lymphadenopathy:     She has no cervical adenopathy.   Neurological: She is alert and oriented to person, place, and time. She displays normal reflexes. She exhibits normal muscle tone. Coordination normal.   Skin: No rash noted. No erythema. No pallor.   Psychiatric: She has a normal mood and affect. Her behavior is normal. Judgment and thought content normal.       Lab " Review    Results for orders placed or performed in visit on 12/18/18   CBC Auto Differential   Result Value Ref Range    WBC 6.86 3.40 - 10.80 10*3/mm3    RBC 4.75 3.77 - 5.28 10*6/mm3    Hemoglobin 14.3 12.0 - 15.9 g/dL    Hematocrit 43.1 34.0 - 46.6 %    MCV 90.7 79.0 - 97.0 fL    MCH 30.1 26.6 - 33.0 pg    MCHC 33.2 31.5 - 35.7 g/dL    RDW 12.9 12.3 - 15.4 %    RDW-SD 42.5 37.0 - 54.0 fl    MPV 11.5 6.0 - 12.0 fL    Platelets 260 140 - 450 10*3/mm3    Neutrophil % 65.8 42.7 - 76.0 %    Lymphocyte % 22.9 19.6 - 45.3 %    Monocyte % 8.3 5.0 - 12.0 %    Eosinophil % 1.9 0.3 - 6.2 %    Basophil % 0.7 0.0 - 1.5 %    Immature Grans % 0.4 0.0 - 0.5 %    Neutrophils, Absolute 4.51 1.70 - 7.00 10*3/mm3    Lymphocytes, Absolute 1.57 0.70 - 3.10 10*3/mm3    Monocytes, Absolute 0.57 0.10 - 0.90 10*3/mm3    Eosinophils, Absolute 0.13 0.00 - 0.40 10*3/mm3    Basophils, Absolute 0.05 0.00 - 0.20 10*3/mm3    Immature Grans, Absolute 0.03 0.00 - 0.05 10*3/mm3    nRBC 0.0 0.0 - 0.2 /100 WBC   Comprehensive Metabolic Panel   Result Value Ref Range    Glucose 187 (H) 65 - 99 mg/dL    BUN 12 8 - 23 mg/dL    Creatinine 0.56 (L) 0.57 - 1.00 mg/dL    Sodium 142 136 - 145 mmol/L    Potassium 4.6 3.5 - 5.2 mmol/L    Chloride 104 98 - 107 mmol/L    CO2 23.7 22.0 - 29.0 mmol/L    Calcium 9.3 8.6 - 10.5 mg/dL    Total Protein 6.7 6.0 - 8.5 g/dL    Albumin 4.10 3.50 - 5.20 g/dL    ALT (SGPT) 11 1 - 33 U/L    AST (SGOT) 9 1 - 32 U/L    Alkaline Phosphatase 106 39 - 117 U/L    Total Bilirubin 0.5 0.2 - 1.2 mg/dL    eGFR Non African Amer 109 >60 mL/min/1.73    Globulin 2.6 gm/dL    A/G Ratio 1.6 g/dL    BUN/Creatinine Ratio 21.4 7.0 - 25.0    Anion Gap 14.3 mmol/L   Hemoglobin A1c   Result Value Ref Range    Hemoglobin A1C 6.90 (H) 4.80 - 5.60 %   Lipid Panel   Result Value Ref Range    Total Cholesterol 196 0 - 200 mg/dL    Triglycerides 401 (H) 0 - 150 mg/dL    HDL Cholesterol 32 (L) 40 - 60 mg/dL    LDL Cholesterol   0 - 100 mg/dL    VLDL  Cholesterol  5 - 40 mg/dL    LDL/HDL Ratio     Microalbumin / Creatinine Urine Ratio - Urine, Clean Catch   Result Value Ref Range    Microalbumin/Creatinine Ratio  mg/g    Creatinine, Urine 53.8 mg/dL    Microalbumin, Urine <1.2 mg/L   TSH   Result Value Ref Range    TSH 2.370 0.270 - 4.200 mIU/mL   Vitamin B12   Result Value Ref Range    Vitamin B-12 >2,000 (H) 211 - 946 pg/mL   Vitamin D 25 Hydroxy   Result Value Ref Range    25 Hydroxy, Vitamin D 31.0 30.0 - 100.0 ng/ml   LDL Cholesterol, Direct   Result Value Ref Range    LDL Cholesterol  117 (H) 0 - 100 mg/dL           Assessment/Plan       ICD-10-CM ICD-9-CM   1. Type 2 diabetes mellitus with diabetic cardiomyopathy (CMS/[facility]) E11.69 250.80    I43 425.7   2. Hypertension associated with diabetes (CMS/[facility]) E11.59 250.80    I10 401.9   3. Mixed diabetic hyperlipidemia associated with type 2 diabetes mellitus (CMS/[facility]) E11.69 250.80    E78.2 272.2   4. Vitamin D deficiency E55.9 268.9   5. B12 deficiency E53.8 266.2   6. Coronary artery disease involving native coronary artery of native heart, angina presence unspecified I25.10 414.01   7. Meralgia paraesthetica, left G57.12 355.1   8. Hypercholesterolemia E78.00 272.0         Glycemic Management:      Lab Results   Component Value Date    HGBA1C 6.90 (H) 06/17/2019    HGBA1C 7.1 (H) 12/14/2018    HGBA1C 6.7 (H) 06/14/2018     Lab Results   Component Value Date    MICROALBUR <1.2 06/17/2019    CREATININE 0.56 (L) 06/17/2019         Diabetes ----    On synjardy xr  and januvia    ozempic not covered ?   Eating better, no longer eating high carb meals from mom    In the future, if glucose rises, we can do trulicity       Lipid Management  Lab Results   Component Value Date    CHOL 196 06/17/2019    CHOL 152 12/14/2018    CHOL 124 09/18/2018     Lab Results   Component Value Date    TRIG 401 (H) 06/17/2019    TRIG 249 (H) 12/14/2018    TRIG 387 (H) 09/18/2018     Lab Results   Component Value Date    HDL 32 (L)  06/17/2019    HDL 47 (L) 12/14/2018    HDL 38 (L) 09/18/2018       Lab Results   Component Value Date    LDL  06/17/2019      Comment:      Unable to calculate     (H) 06/17/2019    LDL 90 12/14/2018     Component      Latest Ref Rng & Units 6/17/2019           9:55 AM   LDL Cholesterol      0 - 100 mg/dL 117 (H)       The 10-year ASCVD risk score (Gigi DASILVA Jr., et al., 2013) is: 15.6%    Values used to calculate the score:      Age: 64 years      Sex: Female      Is Non- : No      Diabetic: Yes      Tobacco smoker: No      Systolic Blood Pressure: 132 mmHg      Is BP treated: Yes      HDL Cholesterol: 32 mg/dL      Total Cholesterol: 196 mg/dL      LDL elevated    can't handle tricor    on vascepa ( intolerant to lovaza )    niaspan 500 mg ER  taking - not approved and not needed     Tried  crestor 5 mg but developed myalgias. Not able to tolerate even every other day     has tried lipitor, zocor, livalo, fluvastatin since 2010 and retried in 2017 and 2018  but hasn't been able to tolerate due to myalgias     Presently only on zetia 10 mg daily     Add repatha     Blood Pressure Management:  Target blood pressure less than 130/80 mm/hg, Not on Ace Inhibitor/ARB, Control of blood pressure  on coreg cr 20 mg daily and  losartan 25 mg   was on benicar ,  off ACEi due to cough    edema left > r , use compression stockings    No loner a problem   Microvascular Complication Monitoring:  No Microalbuminuria, Date of last Microalbumin Assessment 10/08/2015, No Diabetic Retinopathy, No Diabetic     +  neuropathy   Meralgia paresthetica  Start neurontin 300 mg tid had to back off to 100 mg po bid - can't tolerate   no nephropathy    mild microalbuminuria  Immunizations:  Last influenza immunization 2015, Patient prefers not to receive pneumococcal immunization  Preventive Care:  Patient is not smoking  Weight Related      Add contrave to lifestyle changes - felt she was going to die    Add  belviq, side effects      Bone Health  Vit D 50 th weekly and levels are nl   Other Diabetes Related Aspects  Right Thyroid nodule      R nodule , dimensions in HPI , stable from 2008 to 2013     she has Hashimoto's proven on US and + TPO ab, euthyroid June 2013  used to be on Lt4 at 25 mcgs but d/c years ago  follow TSH - nl     Lab Results   Component Value Date    TSH 2.370 06/17/2019         --    10-15  nl B12 but low normal, start b12     having some unsteadiness and numbness    Lab Results   Component Value Date    [id number] >2,000 (H) 06/17/2019     Please take in addition to MVI    No orders of the defined types were placed in this encounter.

## 2019-09-03 RX ORDER — ERGOCALCIFEROL 1.25 MG/1
50000 CAPSULE ORAL
Qty: 12 CAPSULE | Refills: 0 | Status: SHIPPED | OUTPATIENT
Start: 2019-09-03 | End: 2019-11-13

## 2019-11-13 RX ORDER — NITROGLYCERIN 40 MG/1
PATCH TRANSDERMAL
Qty: 30 PATCH | Refills: 1 | Status: CANCELLED | OUTPATIENT
Start: 2019-11-13

## 2019-11-14 ENCOUNTER — APPOINTMENT (OUTPATIENT)
Dept: LAB | Facility: HOSPITAL | Age: 64
End: 2019-11-14

## 2019-11-14 ENCOUNTER — PROCEDURE VISIT (OUTPATIENT)
Dept: OBSTETRICS AND GYNECOLOGY | Facility: CLINIC | Age: 64
End: 2019-11-14

## 2019-11-14 VITALS
BODY MASS INDEX: 27.46 KG/M2 | SYSTOLIC BLOOD PRESSURE: 140 MMHG | HEIGHT: 63 IN | WEIGHT: 155 LBS | DIASTOLIC BLOOD PRESSURE: 82 MMHG

## 2019-11-14 DIAGNOSIS — R35.0 URINARY FREQUENCY: ICD-10-CM

## 2019-11-14 DIAGNOSIS — Z01.411 ENCOUNTER FOR GYNECOLOGICAL EXAMINATION WITH ABNORMAL FINDING: Primary | ICD-10-CM

## 2019-11-14 DIAGNOSIS — Z12.31 ENCOUNTER FOR SCREENING MAMMOGRAM FOR BREAST CANCER: ICD-10-CM

## 2019-11-14 DIAGNOSIS — R10.2 PELVIC PAIN: ICD-10-CM

## 2019-11-14 LAB
BILIRUB UR QL STRIP: NEGATIVE
CLARITY UR: CLEAR
COLOR UR: YELLOW
GLUCOSE UR STRIP-MCNC: ABNORMAL MG/DL
HGB UR QL STRIP.AUTO: NEGATIVE
KETONES UR QL STRIP: ABNORMAL
LEUKOCYTE ESTERASE UR QL STRIP.AUTO: NEGATIVE
NITRITE UR QL STRIP: NEGATIVE
PH UR STRIP.AUTO: 5.5 [PH] (ref 5–8)
PROT UR QL STRIP: NEGATIVE
SP GR UR STRIP: >=1.03 (ref 1–1.03)
UROBILINOGEN UR QL STRIP: ABNORMAL

## 2019-11-14 PROCEDURE — 81003 URINALYSIS AUTO W/O SCOPE: CPT | Performed by: NURSE PRACTITIONER

## 2019-11-14 PROCEDURE — 88141 CYTOPATH C/V INTERPRET: CPT | Performed by: PATHOLOGY

## 2019-11-14 PROCEDURE — 87624 HPV HI-RISK TYP POOLED RSLT: CPT | Performed by: NURSE PRACTITIONER

## 2019-11-14 PROCEDURE — 99386 PREV VISIT NEW AGE 40-64: CPT | Performed by: NURSE PRACTITIONER

## 2019-11-14 PROCEDURE — 88142 CYTOPATH C/V THIN LAYER: CPT | Performed by: NURSE PRACTITIONER

## 2019-11-15 RX ORDER — ERGOCALCIFEROL 1.25 MG/1
50000 CAPSULE ORAL
Qty: 12 CAPSULE | Refills: 0 | Status: SHIPPED | OUTPATIENT
Start: 2019-11-15 | End: 2020-03-06

## 2019-11-18 LAB
GEN CATEG CVX/VAG CYTO-IMP: NORMAL
LAB AP CASE REPORT: NORMAL
LAB AP GYN ADDITIONAL INFORMATION: NORMAL
LAB AP GYN OTHER FINDINGS: NORMAL
PATH INTERP SPEC-IMP: NORMAL
STAT OF ADQ CVX/VAG CYTO-IMP: NORMAL

## 2019-11-21 LAB — HPV I/H RISK 4 DNA CVX QL PROBE+SIG AMP: NEGATIVE

## 2019-11-25 PROBLEM — R10.2 PELVIC PAIN: Status: ACTIVE | Noted: 2019-11-25

## 2019-11-25 NOTE — PROGRESS NOTES
Subjective   Bonnie Garcia is a 64 y.o. here for pap and mammogram. Has c/o pelvic pain for the past 2 months and urinary issues.     LMP- ; had endometrial ablation  Last pap- 2016 NIL; no hx of abnormal  Last mammo- 18 BIRADS 2    States that she has a bladder spasm and urgency recently and wonders if she has a UTI. Also notes that she has been having intermittent low pack and pelvic pain/pressure for the past 2 months. She does have a hx of endometriosis and wonders if she may be having issues with that again. Has not tried anything OTC to help with the discomfort in either area.      Gynecologic Exam   The patient's primary symptoms include pelvic pain. The patient's pertinent negatives include no genital itching, genital lesions, genital odor, genital rash, vaginal bleeding or vaginal discharge. This is a new problem. The current episode started more than 1 month ago. The problem occurs intermittently. The problem has been waxing and waning. The pain is mild. The problem affects both sides. She is not pregnant. Associated symptoms include back pain and urgency. Pertinent negatives include no abdominal pain, anorexia, constipation, diarrhea, discolored urine, dysuria, fever, flank pain, frequency, hematuria, nausea, painful intercourse or vomiting. The vaginal discharge was normal. There has been no bleeding. The symptoms are aggravated by urinating. She has tried nothing for the symptoms. The treatment provided no relief. She is sexually active. She is postmenopausal. Her past medical history is significant for an abdominal surgery, endometriosis and a gynecological surgery. There is no history of a  section, an ectopic pregnancy, miscarriage, ovarian cysts, an STD, a terminated pregnancy or vaginosis.       The following portions of the patient's history were reviewed and updated as appropriate: allergies, current medications, past family history, past medical history, past social history,  past surgical history and problem list.    Review of Systems   Constitutional: Negative for activity change, appetite change, diaphoresis, fatigue, fever, unexpected weight gain and unexpected weight loss.   Respiratory: Negative for chest tightness and shortness of breath.    Cardiovascular: Negative for chest pain and palpitations.   Gastrointestinal: Negative for abdominal distention, abdominal pain, anorexia, constipation, diarrhea, nausea and vomiting.   Endocrine: Negative.    Genitourinary: Positive for pelvic pain, pelvic pressure, urgency and urinary incontinence. Negative for breast discharge, breast lump, breast pain, decreased libido, dyspareunia, dysuria, flank pain, frequency, hematuria, vaginal bleeding, vaginal discharge and vaginal pain.   Musculoskeletal: Positive for back pain. Negative for myalgias.   Skin: Negative for color change, dry skin and skin lesions.   Neurological: Negative for light-headedness and headache.   Psychiatric/Behavioral: Negative for agitation, dysphoric mood, sleep disturbance, depressed mood and stress. The patient is not nervous/anxious.        Objective   Physical Exam   Constitutional: She is oriented to person, place, and time. She appears well-developed and well-nourished.   Neck: No thyromegaly present.   Cardiovascular: Normal rate, regular rhythm, normal heart sounds and intact distal pulses.   Pulmonary/Chest: Effort normal and breath sounds normal. Right breast exhibits no inverted nipple, no mass, no nipple discharge, no skin change and no tenderness. Left breast exhibits no inverted nipple, no mass, no nipple discharge, no skin change and no tenderness. Breasts are symmetrical.   Abdominal: Soft. Bowel sounds are normal. She exhibits no distension. There is no tenderness.   Genitourinary: Vagina normal and uterus normal. No breast discharge or bleeding. There is no rash, tenderness, lesion or injury on the right labia. There is no rash, tenderness, lesion or  injury on the left labia. Cervix exhibits no motion tenderness, no discharge and no friability. Right adnexum displays no mass, no tenderness and no fullness. Left adnexum displays no mass, no tenderness and no fullness.   Genitourinary Comments: Ovaries not palpable. Pap test obtained.    Lymphadenopathy:     She has no axillary adenopathy.        Right: No inguinal adenopathy present.        Left: No inguinal adenopathy present.   Neurological: She is alert and oriented to person, place, and time.   Skin: Skin is warm, dry and intact.   Psychiatric: She has a normal mood and affect. Her speech is normal and behavior is normal.   Nursing note and vitals reviewed.        Assessment/Plan   Bonnie was seen today for gynecologic exam.    Diagnoses and all orders for this visit:    Encounter for gynecological examination with abnormal finding  -     Liquid-based Pap Smear, Screening  -     HPV DNA Probe, Direct - ThinPrep Vial,; Future  -     HPV DNA Probe, Direct - ThinPrep Vial, Cervix, Endocervix    Urinary frequency  -     Urinalysis With Culture If Indicated - Urine, Clean Catch    Pelvic pain  -     Ambulatory Referral to Physical Therapy  -     US Non-ob Transvaginal; Future    Encounter for screening mammogram for breast cancer  -     Mammo Screening Digital Tomosynthesis Bilateral With CAD    Referral sent to PFPT for incontinence issues. Will call with results of testing when available.

## 2019-12-03 ENCOUNTER — APPOINTMENT (OUTPATIENT)
Dept: PHYSICAL THERAPY | Facility: HOSPITAL | Age: 64
End: 2019-12-03

## 2020-01-30 RX ORDER — EZETIMIBE 10 MG/1
10 TABLET ORAL DAILY
Qty: 30 TABLET | Refills: 11 | Status: SHIPPED | OUTPATIENT
Start: 2020-01-30 | End: 2021-02-05

## 2020-02-14 ENCOUNTER — TELEPHONE (OUTPATIENT)
Dept: ENDOCRINOLOGY | Facility: CLINIC | Age: 65
End: 2020-02-14

## 2020-02-14 RX ORDER — LOSARTAN POTASSIUM 25 MG/1
25 TABLET ORAL DAILY
Qty: 90 TABLET | Refills: 2 | Status: CANCELLED | OUTPATIENT
Start: 2020-02-14

## 2020-02-14 NOTE — TELEPHONE ENCOUNTER
Bonnie Garcia (Key: [id number])   Repatha SureClick 140MG/ML auto-injectors   Form  [employer] Medication Request Form   Plan Contact  [phone] phone   [phone] fax   Created   10 minutes ago   Sent to Plan   1 minute ago   Determination   Wait for Determination  Please wait for [employer] to return a determination.

## 2020-02-19 ENCOUNTER — TELEPHONE (OUTPATIENT)
Dept: ENDOCRINOLOGY | Facility: CLINIC | Age: 65
End: 2020-02-19

## 2020-03-06 RX ORDER — ERGOCALCIFEROL 1.25 MG/1
50000 CAPSULE ORAL
Qty: 12 CAPSULE | Refills: 0 | Status: SHIPPED | OUTPATIENT
Start: 2020-03-06 | End: 2020-06-26

## 2020-04-06 RX ORDER — CARVEDILOL PHOSPHATE 20 MG/1
20 CAPSULE, EXTENDED RELEASE ORAL DAILY
Qty: 30 CAPSULE | Refills: 11 | Status: CANCELLED | OUTPATIENT
Start: 2020-04-06

## 2020-05-14 ENCOUNTER — OFFICE VISIT (OUTPATIENT)
Dept: FAMILY MEDICINE CLINIC | Facility: CLINIC | Age: 65
End: 2020-05-14

## 2020-05-14 VITALS
WEIGHT: 158.6 LBS | HEIGHT: 63 IN | BODY MASS INDEX: 28.1 KG/M2 | DIASTOLIC BLOOD PRESSURE: 64 MMHG | SYSTOLIC BLOOD PRESSURE: 110 MMHG

## 2020-05-14 DIAGNOSIS — I43 TYPE 2 DIABETES MELLITUS WITH DIABETIC CARDIOMYOPATHY (HCC): ICD-10-CM

## 2020-05-14 DIAGNOSIS — I10 ESSENTIAL HYPERTENSION: ICD-10-CM

## 2020-05-14 DIAGNOSIS — Z00.00 ANNUAL PHYSICAL EXAM: Primary | ICD-10-CM

## 2020-05-14 DIAGNOSIS — Z23 NEED FOR SHINGLES VACCINE: ICD-10-CM

## 2020-05-14 DIAGNOSIS — E78.00 HYPERCHOLESTEREMIA: ICD-10-CM

## 2020-05-14 DIAGNOSIS — E11.69 TYPE 2 DIABETES MELLITUS WITH DIABETIC CARDIOMYOPATHY (HCC): ICD-10-CM

## 2020-05-14 PROCEDURE — 90471 IMMUNIZATION ADMIN: CPT | Performed by: NURSE PRACTITIONER

## 2020-05-14 PROCEDURE — 90750 HZV VACC RECOMBINANT IM: CPT | Performed by: NURSE PRACTITIONER

## 2020-05-14 PROCEDURE — 99396 PREV VISIT EST AGE 40-64: CPT | Performed by: NURSE PRACTITIONER

## 2020-05-14 NOTE — PROGRESS NOTES
Subjective   Bonnie Garcia is a 65 y.o. female.  Establish primary care and annual exam.  Has high blood pressure, high cholesterol, diabetes.  Currently sees Dr. Daniel Leyva for endocrinology follow-up.    HPI: annual physical exam    Hypertension   This is a chronic problem. The current episode started more than 1 year ago. The problem is unchanged. The problem is controlled. Pertinent negatives include no chest pain, orthopnea, peripheral edema or shortness of breath. There are no associated agents to hypertension. Risk factors for coronary artery disease include diabetes mellitus, dyslipidemia, obesity and post-menopausal state. Past treatments include angiotensin blockers and beta blockers. The current treatment provides significant improvement. There are no compliance problems.    Hyperlipidemia   This is a chronic problem. The current episode started more than 1 year ago. The problem is controlled. Recent lipid tests were reviewed and are normal. Exacerbating diseases include obesity. Factors aggravating her hyperlipidemia include fatty foods. Associated symptoms include myalgias. Pertinent negatives include no chest pain or shortness of breath. Current antihyperlipidemic treatment includes statins, ezetimibe and fibric acid derivatives (Repatha). The current treatment provides significant improvement of lipids. There are no compliance problems.  Risk factors for coronary artery disease include diabetes mellitus, dyslipidemia, hypertension, obesity and post-menopausal.   Diabetes   She presents for her follow-up diabetic visit. She has type 2 diabetes mellitus. Her disease course has been stable. There are no hypoglycemic associated symptoms. Pertinent negatives for hypoglycemia include no confusion. Pertinent negatives for diabetes include no chest pain. There are no hypoglycemic complications. Symptoms are stable. Diabetic complications include heart disease. Risk factors for coronary artery disease  include diabetes mellitus, dyslipidemia, hypertension, family history, obesity and post-menopausal. Current diabetic treatment includes oral agent (triple therapy). She is compliant with treatment all of the time. Her weight is stable. She is following a generally healthy diet. Meal planning includes avoidance of concentrated sweets. Her home blood glucose trend is fluctuating minimally. An ACE inhibitor/angiotensin II receptor blocker is being taken.        The following portions of the patient's history were reviewed and updated as appropriate:     She  has a past medical history of Chicken pox, Coronary arteriosclerosis, Diabetes mellitus (CMS/HCC), Diastasis of muscle, Fibrocystic breast, GERD (gastroesophageal reflux disease), H/O Hashimoto thyroiditis, History of echocardiogram (01/01/2007), History of mammogram (04/13/2015), History of myocardial infarction (2006), History of Papanicolaou smear of cervix (08/04/2004), Hyperlipidemia, Hypertension, Measles, Mild concentric left ventricular hypertrophy (LVH), Thyroid nodule, and Vitamin D deficiency.  She does not have any pertinent problems on file.  She  has a past surgical history that includes Cardiac catheterization (06/28/2016); Cholecystectomy (10/01/1994); Colonoscopy (11/27/2012); Esophagoscopy / EGD (08/10/1994); Injection of Medication; Laparoscopy (12/17/2001); Hernia repair (07/23/2007); tonsillectomy and adenoidectomy (05/13/1963); Tubal ligation (07/23/2007); Vaginal delivery; Esophagogastroduodenoscopy (N/A, 7/12/2018); and Colonoscopy (N/A, 7/12/2018).  Her family history includes Coronary artery disease in her mother and another family member; Diabetes in her mother and another family member; Heart attack in her father; Hypothyroidism in her daughter; Thyroid disease in an other family member.  She  reports that she has quit smoking. Her smoking use included cigarettes. She has never used smokeless tobacco. She reports that she does not drink  alcohol or use drugs.  Current Outpatient Medications   Medication Sig Dispense Refill   • aspirin 81 MG EC tablet Take 324 mg by mouth every night at bedtime. Pt take 4-81 mg tablet at bedtime.     • carvedilol CR (COREG CR) 20 MG 24 hr capsule Take 1 capsule by mouth Daily. 30 capsule 11   • Cyanocobalamin (B-12) 500 MCG sublingual tablet 500 mcgs under the tongue every other day 15 tablet 11   • Empagliflozin-metFORMIN HCl ER (Synjardy XR) 12.5-1000 MG tablet sustained-release 24 hour Take 2 tablets by mouth Daily With Breakfast. 60 tablet 7   • Evolocumab 140 MG/ML solution auto-injector Inject 1 mL under the skin into the appropriate area as directed Every 14 (Fourteen) Days for 24 doses. 2 mL 10   • ezetimibe (ZETIA) 10 MG tablet Take 1 tablet by mouth Daily. 30 tablet 11   • fenofibrate (TRICOR) 145 MG tablet Take 1 tablet by mouth Daily. 90 tablet 12   • glucose blood test strip 1 each by Other route As Needed. Use as instructed     • losartan (COZAAR) 25 MG tablet Take 1 tablet by mouth Daily. 90 tablet 3   • Magnesium 400 MG capsule Take 400 mg by mouth Daily.     • meclizine (ANTIVERT) 12.5 MG tablet Take 1 tablet by mouth 3 (Three) Times a Day As Needed for dizziness. 90 tablet 0   • nitroglycerin (NITRODUR) 0.2 MG/HR patch Place 1 patch on the skin as directed by provider once daily at 7 am and remove nightly at 7 pm. 30 patch 1   • prasugrel (EFFIENT) 10 MG tablet Take 1 tablet by mouth Daily. 90 tablet 2   • ranolazine (RANEXA) 1000 MG 12 hr tablet Take 1 tablet by mouth 2 (Two) Times a Day. 180 tablet 2   • rosuvastatin (CRESTOR) 5 MG tablet Take 1 tablet by mouth Daily. 30 tablet 11   • SITagliptin (Januvia) 100 MG tablet Take 1 tablet by mouth Every Morning Before Breakfast. 30 tablet 11   • vitamin D (ERGOCALCIFEROL) 1.25 MG (15756 UT) capsule capsule Take 1 capsule by mouth Every 7 (Seven) Days. 12 capsule 0   • ranolazine (RANEXA) 500 MG 12 hr tablet Take 500 mg by mouth 2 (Two) Times a Day.        No current facility-administered medications for this visit.      Current Outpatient Medications on File Prior to Visit   Medication Sig   • aspirin 81 MG EC tablet Take 324 mg by mouth every night at bedtime. Pt take 4-81 mg tablet at bedtime.   • carvedilol CR (COREG CR) 20 MG 24 hr capsule Take 1 capsule by mouth Daily.   • Cyanocobalamin (B-12) 500 MCG sublingual tablet 500 mcgs under the tongue every other day   • Empagliflozin-metFORMIN HCl ER (Synjardy XR) 12.5-1000 MG tablet sustained-release 24 hour Take 2 tablets by mouth Daily With Breakfast.   • Evolocumab 140 MG/ML solution auto-injector Inject 1 mL under the skin into the appropriate area as directed Every 14 (Fourteen) Days for 24 doses.   • ezetimibe (ZETIA) 10 MG tablet Take 1 tablet by mouth Daily.   • fenofibrate (TRICOR) 145 MG tablet Take 1 tablet by mouth Daily.   • glucose blood test strip 1 each by Other route As Needed. Use as instructed   • losartan (COZAAR) 25 MG tablet Take 1 tablet by mouth Daily.   • Magnesium 400 MG capsule Take 400 mg by mouth Daily.   • meclizine (ANTIVERT) 12.5 MG tablet Take 1 tablet by mouth 3 (Three) Times a Day As Needed for dizziness.   • nitroglycerin (NITRODUR) 0.2 MG/HR patch Place 1 patch on the skin as directed by provider once daily at 7 am and remove nightly at 7 pm.   • prasugrel (EFFIENT) 10 MG tablet Take 1 tablet by mouth Daily.   • ranolazine (RANEXA) 1000 MG 12 hr tablet Take 1 tablet by mouth 2 (Two) Times a Day.   • rosuvastatin (CRESTOR) 5 MG tablet Take 1 tablet by mouth Daily.   • SITagliptin (Januvia) 100 MG tablet Take 1 tablet by mouth Every Morning Before Breakfast.   • vitamin D (ERGOCALCIFEROL) 1.25 MG (80998 UT) capsule capsule Take 1 capsule by mouth Every 7 (Seven) Days.   • ranolazine (RANEXA) 500 MG 12 hr tablet Take 500 mg by mouth 2 (Two) Times a Day.     No current facility-administered medications on file prior to visit.      She is allergic to chloromycetin  "[chloramphenicol]; iodinated diagnostic agents; and levaquin [levofloxacin]..    Review of Systems   Constitutional: Negative.  Negative for chills and fever.   HENT: Negative.    Eyes: Negative.    Respiratory: Negative.  Negative for shortness of breath.    Cardiovascular: Negative.  Negative for chest pain and orthopnea.   Gastrointestinal: Negative.    Genitourinary: Negative.    Musculoskeletal: Positive for arthralgias and myalgias.   Skin: Negative.    Neurological: Negative.    Hematological: Negative.    Psychiatric/Behavioral: Negative.  Negative for confusion.       Objective    Visit Vitals  /64   Ht 160 cm (63\")   Wt 71.9 kg (158 lb 9.6 oz)   BMI 28.09 kg/m²       Physical Exam   Constitutional: She is oriented to person, place, and time. She appears well-developed and well-nourished.   HENT:   Head: Normocephalic and atraumatic.   Right Ear: External ear normal.   Left Ear: External ear normal.   Nose: Nose normal.   Mouth/Throat: Oropharynx is clear and moist.   Eyes: Pupils are equal, round, and reactive to light. Conjunctivae and EOM are normal.   Neck: Normal range of motion. Neck supple.   Cardiovascular: Normal rate, regular rhythm and normal heart sounds.   Pulmonary/Chest: Effort normal and breath sounds normal.   Abdominal: Soft. Bowel sounds are normal.   Musculoskeletal: Normal range of motion.    Bonnie had a diabetic foot exam performed today.   During the foot exam she had a monofilament test performed (10/10 bilatearlly ).  Neurological: She is alert and oriented to person, place, and time.   Skin: Skin is warm. Capillary refill takes less than 2 seconds.   Psychiatric: She has a normal mood and affect. Her behavior is normal.   Nursing note and vitals reviewed.      Assessment/Plan   Problems Addressed this Visit        Cardiovascular and Mediastinum    Hypertension    Type 2 diabetes mellitus with diabetic cardiomyopathy (CMS/HCC)      Other Visit Diagnoses     Annual physical " exam    -  Primary    Hypercholesteremia        Need for shingles vaccine        Relevant Orders    Shingrix Vaccine (Completed)      No orders of the defined types were placed in this encounter.    1.  Annual physical exam:  Continue on current medications as previously prescribed   I spent 31 minutes in direct face to face contact with patient.  Greater than 50% of this time was spent counseling patient and discussing plan of care.  Return in about 1 year (around 5/14/2021) for Annual physical.    2.  Hypertension:  Continue on Coreg and Cozaar as previously prescribed  Encouraged to reduce sodium intake to no more than 2000 mg/day  Encourage use of salt substitute such as Mrs. Akbar as opposed to table salt  Encouraged to avoid high sodium content foods such as potato chips, cottage cheese, pizza and processed meats    3.  Hypercholesterolemia:  Continue on Zetia, TriCor and Crestor as previously prescribed  Encouraged adhere to low-fat diet  Discussed ways to reduce saturated fats in diet such as removing the skin from chicken or fish prior to cooking  Encouraged use of baking and air frying foods as opposed to deep frying foods  Encouraged to avoid fast food and drive-through's    4.  Type 2 diabetes mellitus with diabetic cardiomyopathy:  Continue on Synjardy and Januvia as previously prescribed  Encouraged adhere to ADA diet  Encouraged to reduce carbohydrates in diet such as bread, potatoes, pasta  Educated importance of thorough diabetic foot care encouraged inspect feet daily  Encouraged to wear shoes at all times especially when outdoors to protect her feet    5.  Need for shingles vaccine:  Shingrix vaccine given IM in office  Educated on possible side effects of this medication including but not limited to increased risk for pain swelling and redness of injection site        This document has been electronically signed by MERT Escobar on May 18, 2020 10:31

## 2020-06-26 RX ORDER — ERGOCALCIFEROL 1.25 MG/1
50000 CAPSULE ORAL
Qty: 12 CAPSULE | Refills: 11 | Status: SHIPPED | OUTPATIENT
Start: 2020-06-26 | End: 2021-08-11 | Stop reason: SDUPTHER

## 2020-07-30 ENCOUNTER — LAB (OUTPATIENT)
Dept: LAB | Facility: HOSPITAL | Age: 65
End: 2020-07-30

## 2020-07-30 ENCOUNTER — OFFICE VISIT (OUTPATIENT)
Dept: ENDOCRINOLOGY | Facility: CLINIC | Age: 65
End: 2020-07-30

## 2020-07-30 VITALS
DIASTOLIC BLOOD PRESSURE: 60 MMHG | HEIGHT: 63 IN | HEART RATE: 62 BPM | WEIGHT: 157.3 LBS | BODY MASS INDEX: 27.87 KG/M2 | SYSTOLIC BLOOD PRESSURE: 112 MMHG | OXYGEN SATURATION: 98 %

## 2020-07-30 DIAGNOSIS — E11.59 HYPERTENSION ASSOCIATED WITH DIABETES (HCC): ICD-10-CM

## 2020-07-30 DIAGNOSIS — E11.69 TYPE 2 DIABETES MELLITUS WITH DIABETIC CARDIOMYOPATHY (HCC): Primary | ICD-10-CM

## 2020-07-30 DIAGNOSIS — I43 TYPE 2 DIABETES MELLITUS WITH DIABETIC CARDIOMYOPATHY (HCC): Primary | ICD-10-CM

## 2020-07-30 DIAGNOSIS — E55.9 VITAMIN D DEFICIENCY: ICD-10-CM

## 2020-07-30 DIAGNOSIS — I25.10 CORONARY ARTERY DISEASE INVOLVING NATIVE CORONARY ARTERY OF NATIVE HEART, ANGINA PRESENCE UNSPECIFIED: ICD-10-CM

## 2020-07-30 DIAGNOSIS — E53.8 B12 DEFICIENCY: ICD-10-CM

## 2020-07-30 DIAGNOSIS — G57.12 MERALGIA PARAESTHETICA, LEFT: ICD-10-CM

## 2020-07-30 DIAGNOSIS — I15.2 HYPERTENSION ASSOCIATED WITH DIABETES (HCC): ICD-10-CM

## 2020-07-30 DIAGNOSIS — E11.69 MIXED DIABETIC HYPERLIPIDEMIA ASSOCIATED WITH TYPE 2 DIABETES MELLITUS (HCC): ICD-10-CM

## 2020-07-30 DIAGNOSIS — E78.2 MIXED DIABETIC HYPERLIPIDEMIA ASSOCIATED WITH TYPE 2 DIABETES MELLITUS (HCC): ICD-10-CM

## 2020-07-30 LAB
25(OH)D3 SERPL-MCNC: 42.8 NG/ML (ref 30–100)
ALBUMIN SERPL-MCNC: 4.3 G/DL (ref 3.5–5.2)
ALBUMIN UR-MCNC: <1.2 MG/DL
ALBUMIN/GLOB SERPL: 1.7 G/DL
ALP SERPL-CCNC: 61 U/L (ref 39–117)
ALT SERPL W P-5'-P-CCNC: 12 U/L (ref 1–33)
ANION GAP SERPL CALCULATED.3IONS-SCNC: 9.8 MMOL/L (ref 5–15)
AST SERPL-CCNC: 15 U/L (ref 1–32)
BASOPHILS # BLD AUTO: 0.05 10*3/MM3 (ref 0–0.2)
BASOPHILS NFR BLD AUTO: 1.1 % (ref 0–1.5)
BILIRUB SERPL-MCNC: 0.5 MG/DL (ref 0–1.2)
BUN SERPL-MCNC: 10 MG/DL (ref 8–23)
BUN/CREAT SERPL: 20.8 (ref 7–25)
CALCIUM SPEC-SCNC: 9.5 MG/DL (ref 8.6–10.5)
CHLORIDE SERPL-SCNC: 104 MMOL/L (ref 98–107)
CHOLEST SERPL-MCNC: 138 MG/DL (ref 0–200)
CO2 SERPL-SCNC: 24.2 MMOL/L (ref 22–29)
CREAT SERPL-MCNC: 0.48 MG/DL (ref 0.57–1)
CREAT UR-MCNC: 37.1 MG/DL
DEPRECATED RDW RBC AUTO: 42.5 FL (ref 37–54)
EOSINOPHIL # BLD AUTO: 0.12 10*3/MM3 (ref 0–0.4)
EOSINOPHIL NFR BLD AUTO: 2.6 % (ref 0.3–6.2)
ERYTHROCYTE [DISTWIDTH] IN BLOOD BY AUTOMATED COUNT: 13 % (ref 12.3–15.4)
GFR SERPL CREATININE-BSD FRML MDRD: 130 ML/MIN/1.73
GLOBULIN UR ELPH-MCNC: 2.5 GM/DL
GLUCOSE SERPL-MCNC: 157 MG/DL (ref 65–99)
HBA1C MFR BLD: 8.13 % (ref 4.8–5.6)
HCT VFR BLD AUTO: 41.3 % (ref 34–46.6)
HDLC SERPL-MCNC: 40 MG/DL (ref 40–60)
HGB BLD-MCNC: 14.2 G/DL (ref 12–15.9)
IMM GRANULOCYTES # BLD AUTO: 0.08 10*3/MM3 (ref 0–0.05)
IMM GRANULOCYTES NFR BLD AUTO: 1.8 % (ref 0–0.5)
LDLC SERPL CALC-MCNC: 37 MG/DL (ref 0–100)
LDLC/HDLC SERPL: 0.94 {RATIO}
LYMPHOCYTES # BLD AUTO: 1.4 10*3/MM3 (ref 0.7–3.1)
LYMPHOCYTES NFR BLD AUTO: 30.9 % (ref 19.6–45.3)
MCH RBC QN AUTO: 31 PG (ref 26.6–33)
MCHC RBC AUTO-ENTMCNC: 34.4 G/DL (ref 31.5–35.7)
MCV RBC AUTO: 90.2 FL (ref 79–97)
MICROALBUMIN/CREAT UR: NORMAL MG/G{CREAT}
MONOCYTES # BLD AUTO: 0.43 10*3/MM3 (ref 0.1–0.9)
MONOCYTES NFR BLD AUTO: 9.5 % (ref 5–12)
NEUTROPHILS NFR BLD AUTO: 2.45 10*3/MM3 (ref 1.7–7)
NEUTROPHILS NFR BLD AUTO: 54.1 % (ref 42.7–76)
NRBC BLD AUTO-RTO: 0 /100 WBC (ref 0–0.2)
PLATELET # BLD AUTO: 286 10*3/MM3 (ref 140–450)
PMV BLD AUTO: 10.9 FL (ref 6–12)
POTASSIUM SERPL-SCNC: 4.5 MMOL/L (ref 3.5–5.2)
PROT SERPL-MCNC: 6.8 G/DL (ref 6–8.5)
RBC # BLD AUTO: 4.58 10*6/MM3 (ref 3.77–5.28)
SODIUM SERPL-SCNC: 138 MMOL/L (ref 136–145)
TRIGL SERPL-MCNC: 303 MG/DL (ref 0–150)
TSH SERPL DL<=0.05 MIU/L-ACNC: 1.67 UIU/ML (ref 0.27–4.2)
VIT B12 BLD-MCNC: 534 PG/ML (ref 211–946)
VLDLC SERPL-MCNC: 60.6 MG/DL (ref 5–40)
WBC # BLD AUTO: 4.53 10*3/MM3 (ref 3.4–10.8)

## 2020-07-30 PROCEDURE — 82043 UR ALBUMIN QUANTITATIVE: CPT | Performed by: INTERNAL MEDICINE

## 2020-07-30 PROCEDURE — 80061 LIPID PANEL: CPT | Performed by: INTERNAL MEDICINE

## 2020-07-30 PROCEDURE — 84443 ASSAY THYROID STIM HORMONE: CPT | Performed by: INTERNAL MEDICINE

## 2020-07-30 PROCEDURE — 82607 VITAMIN B-12: CPT | Performed by: INTERNAL MEDICINE

## 2020-07-30 PROCEDURE — 80053 COMPREHEN METABOLIC PANEL: CPT | Performed by: INTERNAL MEDICINE

## 2020-07-30 PROCEDURE — 36415 COLL VENOUS BLD VENIPUNCTURE: CPT | Performed by: INTERNAL MEDICINE

## 2020-07-30 PROCEDURE — 82570 ASSAY OF URINE CREATININE: CPT | Performed by: INTERNAL MEDICINE

## 2020-07-30 PROCEDURE — 83036 HEMOGLOBIN GLYCOSYLATED A1C: CPT | Performed by: INTERNAL MEDICINE

## 2020-07-30 PROCEDURE — 82306 VITAMIN D 25 HYDROXY: CPT | Performed by: INTERNAL MEDICINE

## 2020-07-30 PROCEDURE — 99214 OFFICE O/P EST MOD 30 MIN: CPT | Performed by: INTERNAL MEDICINE

## 2020-07-30 PROCEDURE — 85025 COMPLETE CBC W/AUTO DIFF WBC: CPT | Performed by: INTERNAL MEDICINE

## 2020-07-30 RX ORDER — ICOSAPENT ETHYL 1000 MG/1
2 CAPSULE ORAL 2 TIMES DAILY WITH MEALS
Qty: 120 CAPSULE | Refills: 11 | Status: SHIPPED | OUTPATIENT
Start: 2020-07-30 | End: 2022-04-12 | Stop reason: SDUPTHER

## 2020-07-30 NOTE — PROGRESS NOTES
Bonnie Garcia is a 65 y.o. female who presents for  evaluation of   Chief Complaint   Patient presents with   • Follow-up     1 year         Primary Care / Referring Provider  Yanet Robert, MERT      Duration/Timing:  Diabetes mellitus type 2, Age at onset of diabetes: 58 years, Onset of symptoms gradual  Timing - constant  Quality - controlled  Severity - high since associated CAD    alleviating factors - degree of understanding/education    Severity (Complications/Hospitalizations)  Secondary Macrovascular Complications:  CAD, No CVA, No PAD  has 50 to 50% LAD     PTCI , stent x 5    2 in proximal LAD    1 in circumflex    may need heart surgery   Secondary Microvascular Complications:  No Diabetic Nephropathy, No Diabetic Retinopathy, No Diabetic Neuropathy    Context  Diabetes Regimen:  Oral Medications  Blood Glucose Readings  at goal  Diet  variable carb intake  Exercise:  Does not exercise    Associated Signs/Symptoms  Hyperglycemic Symptoms:  No polyuria, No polydipsia, No polyphagia, No blurred vision, Weight gain, Weight loss/gain amount 12 lbs lbs since last appointment  Hypoglycemic Episodes:  No documented hypoglycemia  Also h o thyroid nodule  location - right  timing constant  not progressive  severity - low -  bx , Sept 2008 - benign   biochemically euthyroid , off synthroid but used to be on 25 mcgs daily     thyroid US personally reviewed    Sept 2008    1.8 x 1.7 x 1.4 , right , solid , heterogeneous, hypoechoic.     Repeat US June 2013    r solid nodule stable in size, echogenicity consistent w Hashimoto's       Past Medical History:   Diagnosis Date   • Chicken pox    • Coronary arteriosclerosis    • Diabetes mellitus (CMS/HCC)     TYPE 2   • Diastasis of muscle    • Fibrocystic breast    • GERD (gastroesophageal reflux disease)    • H/O Hashimoto thyroiditis    • History of echocardiogram 01/01/2007    ef 35-40% diastolic dysfunction   • History of mammogram 04/13/2015   • History of  myocardial infarction    • History of Papanicolaou smear of cervix 2004    negative   • Hyperlipidemia    • Hypertension    • Measles    • Mild concentric left ventricular hypertrophy (LVH)    • Thyroid nodule    • Vitamin D deficiency      Family History   Problem Relation Age of Onset   • Diabetes Mother    • Coronary artery disease Mother    • Heart attack Father          at 53 multiple mi's   • Coronary artery disease Other    • Diabetes Other    • Thyroid disease Other    • Hypothyroidism Daughter      Social History     Tobacco Use   • Smoking status: Former Smoker     Types: Cigarettes   • Smokeless tobacco: Never Used   • Tobacco comment: smoked for 5 years   Substance Use Topics   • Alcohol use: No   • Drug use: No         Current Outpatient Medications:   •  amoxicillin-clavulanate (AUGMENTIN) 875-125 MG per tablet, Take 1 tablet by mouth 2 (two) times a day until gone., Disp: 20 tablet, Rfl: 0  •  aspirin 81 MG EC tablet, Take 324 mg by mouth every night at bedtime. Pt take 4-81 mg tablet at bedtime., Disp: , Rfl:   •  carvedilol CR (COREG CR) 20 MG 24 hr capsule, Take 1 capsule by mouth Daily., Disp: 30 capsule, Rfl: 11  •  Cyanocobalamin (B-12) 500 MCG sublingual tablet, 500 mcgs under the tongue every other day, Disp: 15 tablet, Rfl: 11  •  Empagliflozin-metFORMIN HCl ER (Synjardy XR) 12.5-1000 MG tablet sustained-release 24 hour, Take 2 tablets by mouth Daily With Breakfast., Disp: 60 tablet, Rfl: 7  •  Evolocumab 140 MG/ML solution auto-injector, Inject 1 mL under the skin into the appropriate area as directed Every 14 (Fourteen) Days for 24 doses., Disp: 2 mL, Rfl: 10  •  ezetimibe (ZETIA) 10 MG tablet, Take 1 tablet by mouth Daily., Disp: 30 tablet, Rfl: 11  •  fenofibrate (TRICOR) 145 MG tablet, Take 1 tablet by mouth Daily., Disp: 90 tablet, Rfl: 12  •  glucose blood test strip, 1 each by Other route As Needed. Use as instructed, Disp: , Rfl:   •  losartan (COZAAR) 25 MG tablet,  Take 1 tablet by mouth Daily., Disp: 90 tablet, Rfl: 3  •  meclizine (ANTIVERT) 12.5 MG tablet, Take 1 tablet by mouth 3 (Three) Times a Day As Needed for dizziness., Disp: 90 tablet, Rfl: 0  •  nitroglycerin (NITRODUR) 0.2 MG/HR patch, Place 1 patch on the skin as directed by provider Daily at 7AM and remove nightly at 7PM., Disp: 30 each, Rfl: 11  •  prasugrel (EFFIENT) 10 MG tablet, Take 1 tablet by mouth Daily., Disp: 90 tablet, Rfl: 2  •  ranolazine (RANEXA) 1000 MG 12 hr tablet, Take 1 tablet by mouth 2 (Two) Times a Day., Disp: 180 tablet, Rfl: 2  •  SITagliptin (Januvia) 100 MG tablet, Take 1 tablet by mouth Every Morning Before Breakfast., Disp: 30 tablet, Rfl: 11  •  vitamin D (ERGOCALCIFEROL) 1.25 MG (75186 UT) capsule capsule, Take 1 capsule by mouth Every 7 (Seven) Days., Disp: 12 capsule, Rfl: 11  •  clindamycin (CLEOCIN) 150 MG capsule, Take 1 capsule by mouth 4 (Four) Times a Day until gone., Disp: 40 capsule, Rfl: 0  •  Magnesium 400 MG capsule, Take 400 mg by mouth Daily., Disp: , Rfl:   •  rosuvastatin (CRESTOR) 5 MG tablet, Take 1 tablet by mouth Daily., Disp: 30 tablet, Rfl: 11    Review of Systems    Review of Systems   Constitutional: Negative for activity change, appetite change, chills, diaphoresis, fatigue, fever and unexpected weight change.   HENT: Negative for congestion, dental problem, drooling, ear discharge, ear pain, facial swelling, mouth sores, postnasal drip, rhinorrhea, sinus pressure, sore throat, tinnitus, trouble swallowing and voice change.    Eyes: Negative for photophobia, pain, discharge, redness, itching and visual disturbance.   Respiratory: Negative for apnea, cough, choking, chest tightness, shortness of breath, wheezing and stridor.    Cardiovascular: Negative for chest pain, palpitations and leg swelling.   Gastrointestinal: Negative for abdominal distention, abdominal pain, constipation, diarrhea, nausea and vomiting.   Endocrine: Negative for cold intolerance,  "heat intolerance, polydipsia, polyphagia and polyuria.   Genitourinary: Negative for decreased urine volume, difficulty urinating, dysuria, flank pain, frequency, hematuria and urgency.   Musculoskeletal: Positive for myalgias. Negative for arthralgias, back pain, gait problem, joint swelling, neck pain and neck stiffness.   Skin: Negative for color change, pallor, rash and wound.   Allergic/Immunologic: Negative for immunocompromised state.   Neurological: Negative for dizziness, tremors, seizures, syncope, facial asymmetry, speech difficulty, weakness, light-headedness, numbness and headaches.   Hematological: Negative for adenopathy.   Psychiatric/Behavioral: Negative for agitation, behavioral problems, confusion, decreased concentration, dysphoric mood, hallucinations, self-injury, sleep disturbance and suicidal ideas. The patient is not nervous/anxious and is not hyperactive.         Objective:     /60 (BP Location: Left arm, Patient Position: Sitting, Cuff Size: Adult)   Pulse 62   Ht 160 cm (63\")   Wt 71.4 kg (157 lb 4.8 oz)   SpO2 98%   BMI 27.86 kg/m²     Physical Exam   Constitutional: She is oriented to person, place, and time. She appears well-developed.   HENT:   Head: Normocephalic.   Right Ear: External ear normal.   Left Ear: External ear normal.   Nose: Nose normal.   Eyes: Conjunctivae and EOM are normal. No scleral icterus.   Neck: Normal range of motion. Neck supple. No tracheal deviation present. No thyromegaly present.   Cardiovascular: Normal rate, regular rhythm, normal heart sounds and intact distal pulses. Exam reveals no gallop and no friction rub.   No murmur heard.  Pulmonary/Chest: Effort normal and breath sounds normal. No stridor. No respiratory distress. She has no wheezes. She has no rales. She exhibits no tenderness.   Abdominal: Soft. Bowel sounds are normal. She exhibits no distension and no mass. There is no tenderness. There is no rebound and no guarding. "   Musculoskeletal: Normal range of motion. She exhibits no tenderness or deformity.   Lymphadenopathy:     She has no cervical adenopathy.   Neurological: She is alert and oriented to person, place, and time. She displays normal reflexes. She exhibits normal muscle tone. Coordination normal.   Skin: No rash noted. No erythema. No pallor.   Psychiatric: She has a normal mood and affect. Her behavior is normal. Judgment and thought content normal.       Lab Review            Assessment/Plan       ICD-10-CM ICD-9-CM   1. Type 2 diabetes mellitus with diabetic cardiomyopathy (CMS/[facility]) E11.69 250.80    I43 425.7   2. Hypertension associated with diabetes (CMS/[facility]) E11.59 250.80    I10 401.9   3. Mixed diabetic hyperlipidemia associated with type 2 diabetes mellitus (CMS/[facility]) E11.69 250.80    E78.2 272.2   4. Vitamin D deficiency E55.9 268.9   5. B12 deficiency E53.8 266.2   6. Coronary artery disease involving native coronary artery of native heart, angina presence unspecified I25.10 414.01   7. Meralgia paraesthetica, left G57.12 355.1         Glycemic Management:      Lab Results   Component Value Date    HGBA1C 6.90 (H) 06/17/2019    HGBA1C 7.1 (H) 12/14/2018    HGBA1C 6.9 (H) 09/27/2018     Lab Results   Component Value Date    MICROALBUR <1.2 06/17/2019    CREATININE 0.56 (L) 06/17/2019         Diabetes ----    On synjardy xr  and januvia    ozempic not covered ?     Sanchez januvia to [facility]     Lipid Management  Lab Results   Component Value Date    CHOL 196 06/17/2019    CHOL 152 12/14/2018    CHOL 124 09/18/2018     Lab Results   Component Value Date    TRIG 401 (H) 06/17/2019    TRIG 249 (H) 12/14/2018    TRIG 387 (H) 09/18/2018     Lab Results   Component Value Date    HDL 32 (L) 06/17/2019    HDL 47 (L) 12/14/2018    HDL 38 (L) 09/18/2018       Lab Results   Component Value Date    LDL  06/17/2019      Comment:      Unable to calculate     (H) 06/17/2019    LDL 90 12/14/2018     Component      Latest  Ref Rng & Units 6/17/2019           9:55 AM   LDL Cholesterol      0 - 100 mg/dL 117 (H)       The 10-year ASCVD risk score (Gigi DASILVA Jr., et al., 2013) is: 12.4%    Values used to calculate the score:      Age: 65 years      Sex: Female      Is Non- : No      Diabetic: Yes      Tobacco smoker: No      Systolic Blood Pressure: 112 mmHg      Is BP treated: Yes      HDL Cholesterol: 32 mg/dL      Total Cholesterol: 196 mg/dL      LDL elevated    can't handle tricor    on vascepa ( intolerant to lovaza )    niaspan 500 mg ER  taking - not approved and not needed     Tried  crestor 5 mg but developed myalgias. Not able to tolerate even every other day     has tried lipitor, zocor, livalo, fluvastatin since 2010 and retried in 2017 and 2018  but hasn't been able to tolerate due to myalgias     Presently only on zetia 10 mg daily     Add repatha     Blood Pressure Management:  Target blood pressure less than 130/80 mm/hg, Not on Ace Inhibitor/ARB, Control of blood pressure  on coreg cr 20 mg daily and  losartan 25 mg   was on benicar ,  off ACEi due to cough    edema left > r , use compression stockings    No longer a problem   Microvascular Complication Monitoring:  No Microalbuminuria, Date of last Microalbumin Assessment 10/08/2015, No Diabetic Retinopathy, No Diabetic     +  neuropathy   Meralgia paresthetica  Start neurontin 300 mg tid had to back off to 100 mg po bid - can't tolerate   no nephropathy    mild microalbuminuria  Immunizations:  Last influenza immunization 2015, Patient prefers not to receive pneumococcal immunization  Preventive Care:  Patient is not smoking  Weight Related      Add contrave to lifestyle changes - felt she was going to die    Add belviq, side effects      Bone Health  Vit D 50 th weekly and levels are nl   Other Diabetes Related Aspects  Right Thyroid nodule      R nodule , dimensions in HPI , stable from 2008 to 2013     she has Hashimoto's proven on US and +  TPO ab, euthyroid June 2013  used to be on Lt4 at 25 mcgs but d/c years ago  follow TSH - nl     Lab Results   Component Value Date    TSH 2.370 06/17/2019         --    10-15  nl B12 but low normal, start b12     having some unsteadiness and numbness    Lab Results   Component Value Date    AMREQPSU52 >2,000 (H) 06/17/2019     Please take in addition to MVI    No orders of the defined types were placed in this encounter.

## 2020-08-20 ENCOUNTER — OFFICE VISIT (OUTPATIENT)
Dept: FAMILY MEDICINE CLINIC | Facility: CLINIC | Age: 65
End: 2020-08-20

## 2020-08-20 VITALS
DIASTOLIC BLOOD PRESSURE: 66 MMHG | HEIGHT: 63 IN | WEIGHT: 157.3 LBS | SYSTOLIC BLOOD PRESSURE: 122 MMHG | BODY MASS INDEX: 27.87 KG/M2

## 2020-08-20 DIAGNOSIS — Z23 NEED FOR SHINGLES VACCINE: Primary | ICD-10-CM

## 2020-08-20 DIAGNOSIS — G57.01 SCIATIC NERVE DISEASE, RIGHT: ICD-10-CM

## 2020-08-20 PROCEDURE — 90471 IMMUNIZATION ADMIN: CPT | Performed by: NURSE PRACTITIONER

## 2020-08-20 PROCEDURE — 99213 OFFICE O/P EST LOW 20 MIN: CPT | Performed by: NURSE PRACTITIONER

## 2020-08-20 PROCEDURE — 90750 HZV VACC RECOMBINANT IM: CPT | Performed by: NURSE PRACTITIONER

## 2020-08-20 PROCEDURE — 96372 THER/PROPH/DIAG INJ SC/IM: CPT | Performed by: NURSE PRACTITIONER

## 2020-08-20 RX ORDER — TRIAMCINOLONE ACETONIDE 40 MG/ML
80 INJECTION, SUSPENSION INTRA-ARTICULAR; INTRAMUSCULAR ONCE
Status: COMPLETED | OUTPATIENT
Start: 2020-08-20 | End: 2020-08-20

## 2020-08-20 RX ADMIN — TRIAMCINOLONE ACETONIDE 80 MG: 40 INJECTION, SUSPENSION INTRA-ARTICULAR; INTRAMUSCULAR at 13:34

## 2020-08-20 NOTE — PROGRESS NOTES
"Subjective   Bonnie Garcia is a 65 y.o. female.  This past Sunday began complaining of right hip and upper leg pain that radiated into her right knee.  \"I woke up Sunday and was hurting so bad I was limping.  I have been limping ever since.  My sister has sciatica and she has a brace so she gave me an extra one and it has helped.  I have been wearing it since last night.\"  Onset of pain after \"cleaning up the closets this weekend.\"  Has taken Tylenol with some relief of pain.    Hip Pain    There was no injury mechanism. The pain is present in the right thigh and right hip. The quality of the pain is described as aching, shooting and stabbing. The pain is at a severity of 9/10. The pain is severe. The pain has been constant since onset. Pertinent negatives include no numbness or tingling. The symptoms are aggravated by weight bearing, palpation and movement. She has tried acetaminophen for the symptoms. The treatment provided mild relief.        The following portions of the patient's history were reviewed and updated as appropriate:     Current Outpatient Medications   Medication Sig Dispense Refill   • aspirin 81 MG EC tablet Take 324 mg by mouth every night at bedtime. Pt take 4-81 mg tablet at bedtime.     • carvedilol CR (COREG CR) 20 MG 24 hr capsule Take 1 capsule by mouth Daily. 30 capsule 11   • clindamycin (CLEOCIN) 150 MG capsule Take 1 capsule by mouth 4 (Four) Times a Day until gone. 40 capsule 0   • Cyanocobalamin (B-12) 500 MCG sublingual tablet 500 mcgs under the tongue every other day 15 tablet 11   • Empagliflozin-metFORMIN HCl ER (Synjardy XR) 12.5-1000 MG tablet sustained-release 24 hour Take 2 tablets by mouth Daily With Breakfast. 60 tablet 7   • ezetimibe (ZETIA) 10 MG tablet Take 1 tablet by mouth Daily. 30 tablet 11   • fenofibrate (TRICOR) 145 MG tablet Take 1 tablet by mouth Daily. 90 tablet 12   • glucose blood test strip 1 each by Other route As Needed. Use as instructed     • " icosapent ethyl (Vascepa) 1 g capsule capsule Take 2 capsules by mouth 2 (Two) Times a Day With Meals. 120 capsule 11   • losartan (COZAAR) 25 MG tablet Take 1 tablet by mouth Daily. 90 tablet 3   • Magnesium 400 MG capsule Take 400 mg by mouth Daily.     • meclizine (ANTIVERT) 12.5 MG tablet Take 1 tablet by mouth 3 (Three) Times a Day As Needed for dizziness. 90 tablet 0   • nitroglycerin (NITRODUR) 0.2 MG/HR patch Place 1 patch on the skin as directed by provider Daily at 7AM and remove nightly at 7PM. 30 each 11   • prasugrel (EFFIENT) 10 MG tablet Take 1 tablet by mouth Daily. 90 tablet 2   • ranolazine (RANEXA) 1000 MG 12 hr tablet Take 1 tablet by mouth 2 (Two) Times a Day. 180 tablet 2   • rosuvastatin (CRESTOR) 5 MG tablet Take 1 tablet by mouth Daily. 30 tablet 11   • SITagliptin (Januvia) 100 MG tablet Take 1 tablet by mouth Daily. 30 tablet 11   • vitamin D (ERGOCALCIFEROL) 1.25 MG (30430 UT) capsule capsule Take 1 capsule by mouth Every 7 (Seven) Days. 12 capsule 11   • amoxicillin-clavulanate (AUGMENTIN) 875-125 MG per tablet Take 1 tablet by mouth 2 (two) times a day until gone. 20 tablet 0     Current Facility-Administered Medications   Medication Dose Route Frequency Provider Last Rate Last Dose   • triamcinolone acetonide (KENALOG-40) injection 80 mg  80 mg Intramuscular Once Yanet Robert APRN         Current Outpatient Medications on File Prior to Visit   Medication Sig   • aspirin 81 MG EC tablet Take 324 mg by mouth every night at bedtime. Pt take 4-81 mg tablet at bedtime.   • carvedilol CR (COREG CR) 20 MG 24 hr capsule Take 1 capsule by mouth Daily.   • clindamycin (CLEOCIN) 150 MG capsule Take 1 capsule by mouth 4 (Four) Times a Day until gone.   • Cyanocobalamin (B-12) 500 MCG sublingual tablet 500 mcgs under the tongue every other day   • Empagliflozin-metFORMIN HCl ER (Synjardy XR) 12.5-1000 MG tablet sustained-release 24 hour Take 2 tablets by mouth Daily With Breakfast.   • ezetimibe  "(ZETIA) 10 MG tablet Take 1 tablet by mouth Daily.   • fenofibrate (TRICOR) 145 MG tablet Take 1 tablet by mouth Daily.   • glucose blood test strip 1 each by Other route As Needed. Use as instructed   • icosapent ethyl (Vascepa) 1 g capsule capsule Take 2 capsules by mouth 2 (Two) Times a Day With Meals.   • losartan (COZAAR) 25 MG tablet Take 1 tablet by mouth Daily.   • Magnesium 400 MG capsule Take 400 mg by mouth Daily.   • meclizine (ANTIVERT) 12.5 MG tablet Take 1 tablet by mouth 3 (Three) Times a Day As Needed for dizziness.   • nitroglycerin (NITRODUR) 0.2 MG/HR patch Place 1 patch on the skin as directed by provider Daily at 7AM and remove nightly at 7PM.   • prasugrel (EFFIENT) 10 MG tablet Take 1 tablet by mouth Daily.   • ranolazine (RANEXA) 1000 MG 12 hr tablet Take 1 tablet by mouth 2 (Two) Times a Day.   • rosuvastatin (CRESTOR) 5 MG tablet Take 1 tablet by mouth Daily.   • SITagliptin (Januvia) 100 MG tablet Take 1 tablet by mouth Daily.   • vitamin D (ERGOCALCIFEROL) 1.25 MG (97959 UT) capsule capsule Take 1 capsule by mouth Every 7 (Seven) Days.   • amoxicillin-clavulanate (AUGMENTIN) 875-125 MG per tablet Take 1 tablet by mouth 2 (two) times a day until gone.     No current facility-administered medications on file prior to visit.      She is allergic to chloromycetin [chloramphenicol]; iodinated diagnostic agents; and levaquin [levofloxacin]..    Review of Systems   Constitutional: Negative.  Negative for chills, diaphoresis, fatigue and fever.   HENT: Negative.  Negative for sore throat.    Respiratory: Negative.  Negative for cough and shortness of breath.    Cardiovascular: Negative.    Gastrointestinal: Negative.    Musculoskeletal: Positive for arthralgias.   Skin: Negative.    Neurological: Negative.  Negative for tingling and numbness.   Psychiatric/Behavioral: Negative.  Negative for confusion.       Objective    Visit Vitals  /66   Ht 160 cm (63\")   Wt 71.4 kg (157 lb 4.8 oz) "   BMI 27.86 kg/m²       Physical Exam   Constitutional: She is oriented to person, place, and time. She appears well-developed and well-nourished.   HENT:   Head: Normocephalic.   Cardiovascular: Normal rate, regular rhythm and normal heart sounds.   Pulmonary/Chest: Effort normal and breath sounds normal.   Musculoskeletal: Normal range of motion.   Moderate amount of tenderness to right sciatic notch with light palpation.  Positive straight leg raise test.  Supportive knee brace in place to right leg   Neurological: She is alert and oriented to person, place, and time.   Skin: Skin is warm. Capillary refill takes less than 2 seconds.   Psychiatric: She has a normal mood and affect. Her behavior is normal.   Nursing note and vitals reviewed.      Assessment/Plan   Problems Addressed this Visit     None      Visit Diagnoses     Sciatic nerve disease, right    -  Primary    Relevant Medications    triamcinolone acetonide (KENALOG-40) injection 80 mg        New Medications Ordered This Visit   Medications   • triamcinolone acetonide (KENALOG-40) injection 80 mg     1.  Sciatic nerve disease, right:  Kenalog 80 mg given IM in office  Educated on possible side effects of steroidal medications including but not limited to increased risk for weight changes, irritability and osteoporosis and glaucoma with prolonged use  Continue on Tylenol OTC according to package directions for pain  Encouraged physical therapy exercises to help loosen muscles and improve strength  Encouraged to avoid lifting anything greater than 5 pounds for the next 48 to 72 hours    Continue on current medications as previously prescribed   I spent 15 minutes in direct face to face contact with patient.  Greater than 50% of this time was spent counseling patient and discussing plan of care.  Return if symptoms worsen or fail to improve, for Next scheduled follow up.        This document has been electronically signed by MERT Escobar on August  20, 2020 13:26

## 2020-09-15 RX ORDER — EMPAGLIFLOZIN, METFORMIN HYDROCHLORIDE 12.5; 1 MG/1; MG/1
2 TABLET, EXTENDED RELEASE ORAL
Qty: 60 TABLET | Refills: 7 | Status: SHIPPED | OUTPATIENT
Start: 2020-09-15 | End: 2021-08-11 | Stop reason: SDUPTHER

## 2020-12-07 RX ORDER — FENOFIBRATE 145 MG/1
145 TABLET, COATED ORAL DAILY
Qty: 90 TABLET | Refills: 12 | Status: CANCELLED | OUTPATIENT
Start: 2020-12-07

## 2020-12-22 ENCOUNTER — APPOINTMENT (OUTPATIENT)
Dept: VACCINE CLINIC | Facility: HOSPITAL | Age: 65
End: 2020-12-22

## 2020-12-28 DIAGNOSIS — R07.9 CHEST PAIN, UNSPECIFIED TYPE: Primary | ICD-10-CM

## 2021-01-12 ENCOUNTER — APPOINTMENT (OUTPATIENT)
Dept: VACCINE CLINIC | Facility: HOSPITAL | Age: 66
End: 2021-01-12

## 2021-01-18 NOTE — H&P
Cardiology History and Physical Note        Patient Name: Bonnie Garcia  Age/Sex: 65 y.o. female  : 1955  MRN: [id number]    Date of Admission : 2021    Primary care Physician: Yanet Robert APRN    Reason for Admission: Chest pain Lexiscan Cardiolite stress test    Subjective:       Chief Complaint: Chest pain    History of Present Illness:  Bonnie Garcia is a 65 y.o. female    Height of 5 feet 3 inches weight 1256 pounds body mass index of 27 with a past medical history significant for atherosclerotic coronary artery disease s/p coronary angiogram done in 2016 which had revealed proximal left anterior descending artery stenosis with subsequent FFR evaluation and PTCA and stenting of the proximal left anterior descending artery with deployment of a 2.75 mm x 12 mm Alpine drug-eluting stent with previous history of PTCA and stenting of the left anterior descending artery with deployment of a 3.5 mm x 18 mm Alpine drug-eluting stent and PTCA and stenting of the distal circumflex artery with deployment of a 2.5 mm x 8 mm Alpine drug-eluting stent, nondominant right coronary artery with 90% stenosis less than 1.5 mm in caliber, previous PTCA and stenting of the proximal left anterior descending artery with deployment of a 3.5 mm x 23 mm and a 3.5 mm x 8 mm Cypher stent and PTCA and stenting of the distal circumflex artery with deployment of a 2 mm x 18 mm mini vision stent done at [facility], arterial hypertension, hypertensive heart disease, hyperlipidemia, nonrheumatic mild mitral and nonrheumatic mild tricuspid regurgitation, non-insulin-dependent diabetes, hyperlipidemia, hypothyroidism, and strong family history for coronary artery disease.    Patient presents for further evaluation for symptoms of chest pain.  Patient complains of having symptoms of chest discomfort along with jaw pain.  Patient prior to the last coronary intervention done in  had symptoms of jaw pain.   Patient complains of having symptoms of shortness of breath.  Patient does admit on eating a lot of salt.  Patient complained of having back pain at work.  Patient has been using the Nitropatch.  Patient does complain of having episodes of snoring with no previous sleep study evaluation.    Patient on further questioning denies any fever chill patient denies any hemoptysis hematuria bright red blood per rectum.  Patient denies any nausea vomiting.    Patient resting electrocardiogram done reveals sinus rhythm at the rate of 78 bpm with nonspecific ST-T wave changes.    Patient blood pressure today is 126/70 pulse of 78 respiration of 18 oxygen saturation of 96%.    Patient 10 point review of system except for what stated in the history of present illness and negative.    Concurrent  Medical History:  1.  Chest pain shortness of breath.  2.  Atherosclerotic coronary artery disease.  3.  Last coronary angiogram done in June 2016 which had revealed:  A.  Nondominant right coronary artery with 90% stenosis which was less than 1.5 mm in caliber.  B.  Sustained patency in the circumflex artery site of previous angioplasty and stenting.  C.  Mid left anterior descending artery with 60 to 70% stenosis with positive FFR evaluation with subsequent PTCA and stenting with deployment of a 2.75 mm x 12 mm drug-eluting stent.  4.  Previous PTCA and stenting of the left anterior descending artery with deployment of a 3.5 mm x 18 mm drug-eluting stent after FFR evaluation which had revealed a value of 0.78.  5.  Previous PTCA and stenting of the distal circumflex artery done at [facility] with deployment of a 2 mm x 18 mm mini vision stent done in 2014.  6.  Previous PTCA and stenting of the proximal left anterior descending artery with deployment of a 3.5 mm x 23 mm and a 3.5 mm x 8 mm Cypher drug-eluting stent.  7.  Arterial hypertension.  8.  Hypertensive heart disease.  9.  Concentric left ventricular hypertrophy with an  ejection fraction of 60%.  10.  Nonrheumatic mild pulmonic insufficiency and nonrheumatic mild tricuspid and mitral regurgitation.  11.  Hyperlipidemia.  12.  Poorly controlled diabetes.  13.  Gastroesophageal reflux disease.  14.  Vitamin D deficiency.  15.  Thyroid nodule  16.  Chronic back pain.  17.  Episodes of snoring with no previous sleep study evaluation.  18.  Fibrocystic breast disease.        Past Surgical History:  1.   Umbilical hernia repair in 2007.  2.   Laparoscopy.  3.   Tonsillectomy.  4.   Cholecystectomy.  5.   Previous history of vaginal delivery.  6.  Coronary angiogram and PTCA and stenting.  7.  Colonoscopy.  8.  Kenalog injection.  9.  Tubal ligation.  10.  Esophagoscopy.  11.  Esophagogastroduodenoscopy.      Family History: Family history significant for mother and father who had coronary artery disease.      Social History: Previous history of tobacco abuse no history of alcohol abuse Works as a registered nurse at [employer].       Cardiac Risk factor:   1.  Postmenopausal.  2.  Arterial hypertension.  3.  Hyperlipidemia.  4.  Diabetes.  5.  Family history for coronary artery disease.    Allergies:  Allergies   Allergen Reactions   • Chloromycetin [Chloramphenicol] Unknown (See Comments)     Unknown- young   • Iodinated Diagnostic Agents Hives     sneezing   • Levaquin [Levofloxacin] Rash       Home Medication::  Prior to Admission medications    Medication Sig Start Date End Date Taking? Authorizing Provider   amoxicillin-clavulanate (AUGMENTIN) 875-125 MG per tablet Take 1 tablet by mouth 2 (two) times a day until gone. 7/20/20   Luke Seo, DMD   aspirin 81 MG EC tablet Take 324 mg by mouth every night at bedtime. Pt take 4-81 mg tablet at bedtime.    Provider, MD Mirella   carvedilol CR (COREG CR) 20 MG 24 hr capsule Take 1 capsule by mouth Daily. 4/10/20      clindamycin (CLEOCIN) 150 MG capsule Take 1 capsule by mouth 4 (Four) Times a Day until gone. 7/29/20    Luke Seo, DMD   Cyanocobalamin (B-12) 500 MCG sublingual tablet 500 mcgs under the tongue every other day 12/18/18   Victorino Smith MD   Empagliflozin-metFORMIN HCl ER (Synjardy XR) 12.5-1000 MG tablet sustained-release 24 hour Take 2 tablets by mouth Daily With Breakfast. 9/15/20   Victorino Smith MD   Evolocumab (REPATHA) solution auto-injector SureClick injection Inject 1 mL under the skin into the appropriate area as directed Every 14 (Fourteen) Days for 24 doses. 9/9/20 7/29/21  Victorino Smith MD   ezetimibe (ZETIA) 10 MG tablet Take 1 tablet by mouth Daily. 1/30/20   Victorino Smith MD   fenofibrate (TRICOR) 145 MG tablet Take 1 tablet by mouth Daily. 12/16/20      glucose blood test strip 1 each by Other route As Needed. Use as instructed    Provider, MD Mirella   icosapent ethyl (Vascepa) 1 g capsule capsule Take 2 capsules by mouth 2 (Two) Times a Day With Meals. 7/30/20   Victorino Smith MD   losartan (COZAAR) 25 MG tablet Take 1 tablet by mouth Daily. 4/10/20      Magnesium 400 MG capsule Take 400 mg by mouth Daily.    Emergency, Nurse Epic, RN   meclizine (ANTIVERT) 12.5 MG tablet Take 1 tablet by mouth 3 (Three) Times a Day As Needed for dizziness. 9/19/18   Gomez Senior MD   nitroglycerin (NITRODUR) 0.2 MG/HR patch Place 1 patch on the skin as directed by provider Daily at 7AM and remove nightly at 7PM. 6/26/20      prasugrel (EFFIENT) 10 MG tablet Take 1 tablet by mouth Daily. 10/16/20      ranolazine (RANEXA) 1000 MG 12 hr tablet Take 1 tablet by mouth 2 (two) times a day. 10/29/20      rosuvastatin (CRESTOR) 5 MG tablet Take 1 tablet by mouth Daily. 1/5/19   Victorino Smith MD   SITagliptin (Januvia) 100 MG tablet Take 1 tablet by mouth Daily. 8/4/20   Victorino Smith MD   vitamin D (ERGOCALCIFEROL) 1.25 MG (11943 UT) capsule capsule Take 1 capsule by mouth Every 7 (Seven) Days. 6/26/20   Luci French,  APRN         Review of Systems   Constitutional: Positive for fatigue. Negative for chills, fever and unexpected weight change.   HENT: Negative for hearing loss and nosebleeds.    Eyes: Negative for visual disturbance.   Respiratory: Positive for chest tightness and shortness of breath. Negative for cough and wheezing.    Cardiovascular: Positive for chest pain. Negative for palpitations and leg swelling.   Gastrointestinal: Negative for abdominal pain, blood in stool, constipation, diarrhea, nausea and vomiting.   Endocrine: Negative for cold intolerance, heat intolerance, polydipsia, polyphagia and polyuria.   Genitourinary: Negative for hematuria.   Musculoskeletal: Negative for joint swelling, myalgias and neck pain.   Skin: Negative for color change, rash and wound.   Neurological: Negative for dizziness, seizures, syncope, light-headedness, numbness and headaches.   Hematological: Does not bruise/bleed easily.         Objective:     There were no vitals taken for this visit.  Blood pressure 126/70 pulse of 78 regular respiration of 18 oxygen saturation of 97%.  Constitutional:       Appearance: Well-developed.   Eyes:      General: No scleral icterus.     Conjunctiva/sclera: Conjunctivae normal.      Pupils: Pupils are equal, round, and reactive to light.   HENT:      Head: Normocephalic and atraumatic.      Left Ear: External ear normal.      Nose: Nose normal.   Neck:      Musculoskeletal: Normal range of motion and neck supple.      Thyroid: No thyromegaly.      Vascular: No JVD.      Trachea: No tracheal deviation.      Lymphadenopathy: No cervical adenopathy.   Pulmonary:      Breath sounds: Normal breath sounds. No stridor.   Cardiovascular:      Normal rate. Regular rhythm.   Abdominal:      General: Bowel sounds are normal.   Musculoskeletal: Normal range of motion.   Skin:     General: Skin is warm and dry.   Neurological:      Mental Status: Alert and oriented to person, place, and time.      Deep  Tendon Reflexes: Reflexes are normal and symmetric.   Psychiatric:         Behavior: Behavior normal.         Thought Content: Thought content normal.         Judgment: Judgment normal.         Lab Review:           Invalid input(s): LABALBUMARIA FERNANDA                                    EKG:   ECG/EMG Results (last 24 hours)     ** No results found for the last 24 hours. **          Imaging:  Imaging Results (Last 24 Hours)     ** No results found for the last 24 hours. **          I personally viewed and interpreted the patient's EKG/Telemetry data.    Assessment:   1.  Chest pain.  2.  Atherosclerotic coronary artery disease.  3.  Previous PTCA and stenting of the left anterior descending artery and distal circumflex artery.  4.  Arterial hypertension.  5.  Hypertensive heart disease.  6.  Hyperlipidemia.  7.  Diabetes.  8.  Vitamin D deficiency        Plan:   1.  Chest pain.  Patient has history of documented atherosclerotic coronary artery disease with previous PTCA and stenting of the left anterior descending artery and circumflex artery with the last coronary intervention done in 2016.  Patient resting electrocardiogram done reveals sinus rhythm with nonspecific ST-T wave changes.  Patient at the present time has been recommended to undergo a Lexiscan Cardiolite stress test.  Risk-benefit treatment option for the Lexiscan Cardiolite stress test were discussed with the patient and an informed consent was obtained.  Plan was to proceed with a Lexiscan Cardiolite stress test.  Patient has been recommended to continue with the present dose of the Ranexa 1000 mg twice a day along with the Nitropatch and Effient and aspirin.    2.  Arterial hypertension.  Patient blood pressure is currently 126/70.  Patient denies any symptoms of headache.  Patient would be continued on the present dose of the losartan 25 mg daily.  Patient is to continue with the present dose of the carvedilol 20 mg daily.    3.  Hypertensive heart  disease with nonrheumatic mild mitral and nonrheumatic mild tricuspid regurgitation.  Patient does complain of having symptoms of shortness of breath.  Patient has been recommended to undergo a transthoracic echocardiogram to evaluate the left ventricular systolic function and to rule out regional segmental wall motion abnormality.    4.  Hyperlipidemia.  Patient has been counseled on low-fat low-cholesterol diet.  Patient is currently on Repatha 140 mg every 2 weeks along with Zetia 10 mg daily.  Patient has not been able to tolerate statin.  Patient would be continued on the present dose of the fenofibrate 145 mg daily.    5.  Vitamin D deficiency.  Patient is currently on vitamin D supplement 50,000 units and has been followed by the primary care physician.    6.  Diabetes poorly controlled.  Patient is currently on Janumet.  Patient has been followed by endocrinology.  Patient has been counseled on American diabetic [employer] diet.    7.  Gastroesophageal reflux disease.  Patient is currently on Prilosec 20 mg daily.    8.  Episodes of snoring.  Patient has been recommended to undergo sleep study to rule out obstructive sleep apnea.    The above plan of management were discussed with the patient.    Further recommendation to follow after the Lexiscan Cardiolite stress test      Time: time spent in face-to-face evaluation of greater than 55 minutes and interacting and formulating examining and discussing the plan with the patient with 50% of greater time spent in face-to-face interaction.    Electronically signed by Dk Colon MD, 01/17/21, 8:18 PM CST.    Dictated utilizing Dragon dictation.

## 2021-01-22 ENCOUNTER — HOSPITAL ENCOUNTER (OUTPATIENT)
Dept: CARDIOLOGY | Facility: HOSPITAL | Age: 66
Discharge: HOME | End: 2021-01-22

## 2021-01-22 ENCOUNTER — HOSPITAL ENCOUNTER (OUTPATIENT)
Dept: NUCLEAR MEDICINE | Facility: HOSPITAL | Age: 66
Discharge: HOME | End: 2021-01-22

## 2021-01-22 DIAGNOSIS — I25.10 ATHEROSCLEROSIS OF NATIVE CORONARY ARTERY OF NATIVE HEART, ANGINA PRESENCE UNSPECIFIED: ICD-10-CM

## 2021-01-22 DIAGNOSIS — R07.9 CHEST PAIN, UNSPECIFIED TYPE: ICD-10-CM

## 2021-01-22 DIAGNOSIS — Z98.61 HISTORY OF PTCA: ICD-10-CM

## 2021-01-22 PROCEDURE — 78452 HT MUSCLE IMAGE SPECT MULT: CPT

## 2021-01-22 PROCEDURE — 0 TECHNETIUM SESTAMIBI: Performed by: INTERNAL MEDICINE

## 2021-01-22 PROCEDURE — 25010000002 REGADENOSON 0.4 MG/5ML SOLUTION: Performed by: INTERNAL MEDICINE

## 2021-01-22 PROCEDURE — 93017 CV STRESS TEST TRACING ONLY: CPT

## 2021-01-22 PROCEDURE — A9500 TC99M SESTAMIBI: HCPCS | Performed by: INTERNAL MEDICINE

## 2021-01-22 RX ORDER — SODIUM CHLORIDE 0.9 % (FLUSH) 0.9 %
10 SYRINGE (ML) INJECTION ONCE
Status: COMPLETED | OUTPATIENT
Start: 2021-01-22 | End: 2021-01-22

## 2021-01-22 RX ADMIN — REGADENOSON 0.4 MG: 0.08 INJECTION, SOLUTION INTRAVENOUS at 08:30

## 2021-01-22 RX ADMIN — SODIUM CHLORIDE, PRESERVATIVE FREE 10 ML: 5 INJECTION INTRAVENOUS at 08:30

## 2021-01-22 RX ADMIN — TECHNETIUM TC 99M SESTAMIBI 1 DOSE: 1 INJECTION INTRAVENOUS at 08:30

## 2021-01-22 RX ADMIN — TECHNETIUM TC 99M SESTAMIBI 1 DOSE: 1 INJECTION INTRAVENOUS at 07:28

## 2021-01-29 ENCOUNTER — IMMUNIZATION (OUTPATIENT)
Dept: VACCINE CLINIC | Facility: HOSPITAL | Age: 66
End: 2021-01-29

## 2021-01-29 PROCEDURE — 0001A: CPT | Performed by: THORACIC SURGERY (CARDIOTHORACIC VASCULAR SURGERY)

## 2021-01-29 PROCEDURE — 91300 HC SARSCOV02 VAC 30MCG/0.3ML IM: CPT | Performed by: THORACIC SURGERY (CARDIOTHORACIC VASCULAR SURGERY)

## 2021-02-01 LAB
BH CV STRESS BP STAGE 1: NORMAL
BH CV STRESS COMMENTS STAGE 1: NORMAL
BH CV STRESS DOSE REGADENOSON STAGE 1: 0.4
BH CV STRESS DURATION MIN STAGE 1: 0
BH CV STRESS DURATION SEC STAGE 1: 10
BH CV STRESS HR STAGE 1: 98
BH CV STRESS PROTOCOL 1: NORMAL
BH CV STRESS RECOVERY BP: NORMAL MMHG
BH CV STRESS RECOVERY HR: 95 BPM
BH CV STRESS STAGE 1: 1
LV EF NUC BP: 71 %
MAXIMAL PREDICTED HEART RATE: 156 BPM
PERCENT MAX PREDICTED HR: 65.38 %
STRESS BASELINE BP: NORMAL MMHG
STRESS BASELINE HR: 88 BPM
STRESS PERCENT HR: 77 %
STRESS POST ESTIMATED WORKLOAD: 1 METS
STRESS POST PEAK BP: NORMAL MMHG
STRESS POST PEAK HR: 102 BPM
STRESS TARGET HR: 133 BPM

## 2021-02-03 ENCOUNTER — LAB (OUTPATIENT)
Dept: LAB | Facility: HOSPITAL | Age: 66
End: 2021-02-03

## 2021-02-03 LAB
25(OH)D3 SERPL-MCNC: 47 NG/ML (ref 30–100)
ALBUMIN SERPL-MCNC: 4.5 G/DL (ref 3.5–5.2)
ALBUMIN UR-MCNC: 1.5 MG/DL
ALBUMIN/GLOB SERPL: 2 G/DL
ALP SERPL-CCNC: 87 U/L (ref 39–117)
ALT SERPL W P-5'-P-CCNC: 7 U/L (ref 1–33)
ANION GAP SERPL CALCULATED.3IONS-SCNC: 14.4 MMOL/L (ref 5–15)
AST SERPL-CCNC: 14 U/L (ref 1–32)
BASOPHILS # BLD AUTO: 0.06 10*3/MM3 (ref 0–0.2)
BASOPHILS NFR BLD AUTO: 1.4 % (ref 0–1.5)
BILIRUB SERPL-MCNC: 0.6 MG/DL (ref 0–1.2)
BUN SERPL-MCNC: 12 MG/DL (ref 8–23)
BUN/CREAT SERPL: 21.8 (ref 7–25)
CALCIUM SPEC-SCNC: 9.7 MG/DL (ref 8.6–10.5)
CHLORIDE SERPL-SCNC: 105 MMOL/L (ref 98–107)
CHOLEST SERPL-MCNC: 263 MG/DL (ref 0–200)
CO2 SERPL-SCNC: 23.6 MMOL/L (ref 22–29)
CREAT SERPL-MCNC: 0.55 MG/DL (ref 0.57–1)
CREAT UR-MCNC: 31.8 MG/DL
DEPRECATED RDW RBC AUTO: 40.5 FL (ref 37–54)
EOSINOPHIL # BLD AUTO: 0.11 10*3/MM3 (ref 0–0.4)
EOSINOPHIL NFR BLD AUTO: 2.6 % (ref 0.3–6.2)
ERYTHROCYTE [DISTWIDTH] IN BLOOD BY AUTOMATED COUNT: 12.8 % (ref 12.3–15.4)
GFR SERPL CREATININE-BSD FRML MDRD: 111 ML/MIN/1.73
GLOBULIN UR ELPH-MCNC: 2.2 GM/DL
GLUCOSE SERPL-MCNC: 169 MG/DL (ref 65–99)
HBA1C MFR BLD: 8.43 % (ref 4.8–5.6)
HCT VFR BLD AUTO: 42.7 % (ref 34–46.6)
HDLC SERPL-MCNC: 35 MG/DL (ref 40–60)
HGB BLD-MCNC: 14.5 G/DL (ref 12–15.9)
IMM GRANULOCYTES # BLD AUTO: 0.07 10*3/MM3 (ref 0–0.05)
IMM GRANULOCYTES NFR BLD AUTO: 1.6 % (ref 0–0.5)
LDLC SERPL CALC-MCNC: 183 MG/DL (ref 0–100)
LDLC/HDLC SERPL: 5.19 {RATIO}
LYMPHOCYTES # BLD AUTO: 1.1 10*3/MM3 (ref 0.7–3.1)
LYMPHOCYTES NFR BLD AUTO: 25.9 % (ref 19.6–45.3)
MCH RBC QN AUTO: 29.7 PG (ref 26.6–33)
MCHC RBC AUTO-ENTMCNC: 34 G/DL (ref 31.5–35.7)
MCV RBC AUTO: 87.3 FL (ref 79–97)
MICROALBUMIN/CREAT UR: 47.2 MG/G
MONOCYTES # BLD AUTO: 0.37 10*3/MM3 (ref 0.1–0.9)
MONOCYTES NFR BLD AUTO: 8.7 % (ref 5–12)
NEUTROPHILS NFR BLD AUTO: 2.54 10*3/MM3 (ref 1.7–7)
NEUTROPHILS NFR BLD AUTO: 59.8 % (ref 42.7–76)
NRBC BLD AUTO-RTO: 0 /100 WBC (ref 0–0.2)
PLATELET # BLD AUTO: 288 10*3/MM3 (ref 140–450)
PMV BLD AUTO: 11.1 FL (ref 6–12)
POTASSIUM SERPL-SCNC: 4.2 MMOL/L (ref 3.5–5.2)
PROT SERPL-MCNC: 6.7 G/DL (ref 6–8.5)
RBC # BLD AUTO: 4.89 10*6/MM3 (ref 3.77–5.28)
SODIUM SERPL-SCNC: 143 MMOL/L (ref 136–145)
TRIGL SERPL-MCNC: 232 MG/DL (ref 0–150)
TSH SERPL DL<=0.05 MIU/L-ACNC: 1.68 UIU/ML (ref 0.27–4.2)
VIT B12 BLD-MCNC: 434 PG/ML (ref 211–946)
VLDLC SERPL-MCNC: 45 MG/DL (ref 5–40)
WBC # BLD AUTO: 4.25 10*3/MM3 (ref 3.4–10.8)

## 2021-02-03 PROCEDURE — 82306 VITAMIN D 25 HYDROXY: CPT | Performed by: INTERNAL MEDICINE

## 2021-02-03 PROCEDURE — 36415 COLL VENOUS BLD VENIPUNCTURE: CPT | Performed by: INTERNAL MEDICINE

## 2021-02-03 PROCEDURE — 82607 VITAMIN B-12: CPT | Performed by: INTERNAL MEDICINE

## 2021-02-03 PROCEDURE — 83036 HEMOGLOBIN GLYCOSYLATED A1C: CPT | Performed by: INTERNAL MEDICINE

## 2021-02-03 PROCEDURE — 82570 ASSAY OF URINE CREATININE: CPT | Performed by: INTERNAL MEDICINE

## 2021-02-03 PROCEDURE — 85025 COMPLETE CBC W/AUTO DIFF WBC: CPT | Performed by: INTERNAL MEDICINE

## 2021-02-03 PROCEDURE — 80053 COMPREHEN METABOLIC PANEL: CPT | Performed by: INTERNAL MEDICINE

## 2021-02-03 PROCEDURE — 84443 ASSAY THYROID STIM HORMONE: CPT | Performed by: INTERNAL MEDICINE

## 2021-02-03 PROCEDURE — 82043 UR ALBUMIN QUANTITATIVE: CPT | Performed by: INTERNAL MEDICINE

## 2021-02-03 PROCEDURE — 80061 LIPID PANEL: CPT | Performed by: INTERNAL MEDICINE

## 2021-02-05 ENCOUNTER — TELEMEDICINE (OUTPATIENT)
Dept: ENDOCRINOLOGY | Facility: CLINIC | Age: 66
End: 2021-02-05

## 2021-02-05 DIAGNOSIS — I25.10 CORONARY ARTERY DISEASE INVOLVING NATIVE CORONARY ARTERY OF NATIVE HEART, ANGINA PRESENCE UNSPECIFIED: ICD-10-CM

## 2021-02-05 DIAGNOSIS — E55.9 VITAMIN D DEFICIENCY: ICD-10-CM

## 2021-02-05 DIAGNOSIS — E11.69 TYPE 2 DIABETES MELLITUS WITH DIABETIC CARDIOMYOPATHY (HCC): Primary | ICD-10-CM

## 2021-02-05 DIAGNOSIS — E65 LIPOHYPERTROPHY: ICD-10-CM

## 2021-02-05 DIAGNOSIS — I15.2 HYPERTENSION ASSOCIATED WITH DIABETES (HCC): ICD-10-CM

## 2021-02-05 DIAGNOSIS — E53.8 B12 DEFICIENCY: ICD-10-CM

## 2021-02-05 DIAGNOSIS — E11.59 HYPERTENSION ASSOCIATED WITH DIABETES (HCC): ICD-10-CM

## 2021-02-05 DIAGNOSIS — E78.2 MIXED DIABETIC HYPERLIPIDEMIA ASSOCIATED WITH TYPE 2 DIABETES MELLITUS (HCC): ICD-10-CM

## 2021-02-05 DIAGNOSIS — I43 TYPE 2 DIABETES MELLITUS WITH DIABETIC CARDIOMYOPATHY (HCC): Primary | ICD-10-CM

## 2021-02-05 DIAGNOSIS — E11.69 MIXED DIABETIC HYPERLIPIDEMIA ASSOCIATED WITH TYPE 2 DIABETES MELLITUS (HCC): ICD-10-CM

## 2021-02-05 PROCEDURE — 99214 OFFICE O/P EST MOD 30 MIN: CPT | Performed by: INTERNAL MEDICINE

## 2021-02-05 RX ORDER — INSULIN HUMAN 4 1/1
4 POWDER, METERED RESPIRATORY (INHALATION) 3 TIMES DAILY PRN
Qty: 90 EACH | Refills: 11 | Status: SHIPPED | OUTPATIENT
Start: 2021-02-05 | End: 2021-02-17 | Stop reason: SDUPTHER

## 2021-02-05 NOTE — PROGRESS NOTES
Bonnie Garcia is a 65 y.o. female who presents for  evaluation of type 2 diabetes                                      This was a Telehealth Encounter. Benefits and Disadvantages of a Telehealth Visit were discussed and accepted by patient. .  Patient agreed to receive service through Telehealth visit as patient is being compliant with social distancing recommendations imparted by CDC.     You have chosen to receive care through a telehealth visit.  Do you consent to use a video/audio connection for your medical care today? Yes        Primary Care / Referring Provider  Yanet Robert APRN      Diabetes mellitus type 2     Age at onset of diabetes: 58 years complicated by CAD , no CHF , recent stress test 2021 and normal     No microvascular complication     States sugars in the 150s  Fasting , after fruits and candy , it rises       Review of Systems     Objective:     There were no vitals taken for this visit.    Physical Exam   HENT:   Head: Normocephalic.   Cardiovascular: Normal rate and regular rhythm.   Pulmonary/Chest: Effort normal.   Abdominal: Normal appearance.   Musculoskeletal:      Right lower leg: No edema.      Left lower leg: No edema.   Neurological: She is alert.   Skin:   lipohypertrophy        Lab Review            Assessment/Plan       ICD-10-CM ICD-9-CM   1. Type 2 diabetes mellitus with diabetic cardiomyopathy (CMS/HCC)  E11.69 250.80    I43 425.7   2. Hypertension associated with diabetes (CMS/HCC)  E11.59 250.80    I15.2 401.9   3. Mixed diabetic hyperlipidemia associated with type 2 diabetes mellitus (CMS/HCC)  E11.69 250.80    E78.2 272.2   4. Coronary artery disease involving native coronary artery of native heart, angina presence unspecified  I25.10 414.01   5. Vitamin D deficiency  E55.9 268.9   6. B12 deficiency  E53.8 266.2         Glycemic Management:      Lab Results   Component Value Date    HGBA1C 8.43 (H) 02/03/2021    HGBA1C 8.13 (H) 07/30/2020    HGBA1C 6.90 (H)  06/17/2019     Lab Results   Component Value Date    MICROALBUR 1.5 02/03/2021    CREATININE 0.55 (L) 02/03/2021         Diabetes ----    On synjardy xr  12.5/1000 mg , 2 tabs w bkfast    Januvia 100 mg daily     Intolerant to GLP 1 class     Lipid Management  Lab Results   Component Value Date    CHOL 263 (H) 02/03/2021    CHOL 138 07/30/2020    CHOL 196 06/17/2019     Lab Results   Component Value Date    TRIG 232 (H) 02/03/2021    TRIG 303 (H) 07/30/2020    TRIG 401 (H) 06/17/2019     Lab Results   Component Value Date    HDL 35 (L) 02/03/2021    HDL 40 07/30/2020    HDL 32 (L) 06/17/2019       Lab Results   Component Value Date     (H) 02/03/2021    LDL 37 07/30/2020    LDL  06/17/2019      Comment:      Unable to calculate     (H) 06/17/2019                      The 10-year ASCVD risk score (Gigi DASILVA Jr., et al., 2013) is: 16.3%    Values used to calculate the score:      Age: 65 years      Sex: Female      Is Non- : No      Diabetic: Yes      Tobacco smoker: No      Systolic Blood Pressure: 122 mmHg      Is BP treated: Yes      HDL Cholesterol: 35 mg/dL      Total Cholesterol: 263 mg/dL    I stopped fenofibrate  On Feb 5, 2021    Intolerant to all statins and zetia    On repatha but has missed some doses       Blood Pressure Management:      off ACEi due to cough    On losartan and beta blocker      Microvascular Complication Monitoring:      No Microalbuminuria,  No Diabetic Retinopathy but needs assessment for 2021      +  neuropathy and Meralgia paresthetica, couldn't tolerate neurontin           Bone Health    Lab Results   Component Value Date    GTFP52AL 47.0 02/03/2021    WRWR94MA 42.8 07/30/2020    EUBG89KG 31.0 06/17/2019       Vit D 50 th weekly          Right Thyroid nodule      R nodule , dimensions in HPI , stable from 2008 to 2013     she has Hashimoto's proven on US and + TPO ab, euthyroid June 2013  used to be on Lt4 at 25 mcgs but d/c years ago  follow TSH  - nl     Lab Results   Component Value Date    TSH 1.680 02/03/2021         --    b12 def    On b12     Lab Results   Component Value Date    ORUHAKEW25 434 02/03/2021     Please take in addition to MVI    No orders of the defined types were placed in this encounter.      I spent 15 minutes reviewing patient electronic chart , reviewing medications , past history , active problems.   I provided advice regarding management of medical conditions, refilled prescriptions , ordered labs and arranged for future appointment.   Patient was advised to contact us if there were any unanswered questions or ongoing concerns.

## 2021-02-09 ENCOUNTER — DOCUMENTATION (OUTPATIENT)
Dept: ENDOCRINOLOGY | Facility: CLINIC | Age: 66
End: 2021-02-09

## 2021-02-17 RX ORDER — INSULIN HUMAN 4 1/1
4 POWDER, METERED RESPIRATORY (INHALATION) 3 TIMES DAILY PRN
Qty: 90 EACH | Refills: 11 | Status: SHIPPED | OUTPATIENT
Start: 2021-02-17 | End: 2021-02-25 | Stop reason: SDUPTHER

## 2021-02-19 ENCOUNTER — IMMUNIZATION (OUTPATIENT)
Dept: VACCINE CLINIC | Facility: HOSPITAL | Age: 66
End: 2021-02-19

## 2021-02-19 PROCEDURE — 91300 HC SARSCOV02 VAC 30MCG/0.3ML IM: CPT | Performed by: THORACIC SURGERY (CARDIOTHORACIC VASCULAR SURGERY)

## 2021-02-19 PROCEDURE — 0002A: CPT | Performed by: THORACIC SURGERY (CARDIOTHORACIC VASCULAR SURGERY)

## 2021-02-23 RX ORDER — PROCHLORPERAZINE 25 MG/1
1 SUPPOSITORY RECTAL ONCE
Qty: 1 EACH | Refills: 0 | Status: SHIPPED | OUTPATIENT
Start: 2021-02-23 | End: 2021-02-23

## 2021-02-23 RX ORDER — PROCHLORPERAZINE 25 MG/1
1 SUPPOSITORY RECTAL ONCE
Qty: 1 EACH | Refills: 3 | Status: SHIPPED | OUTPATIENT
Start: 2021-02-23 | End: 2021-02-23

## 2021-02-23 RX ORDER — PROCHLORPERAZINE 25 MG/1
SUPPOSITORY RECTAL AS NEEDED
Qty: 9 EACH | Refills: 3 | Status: SHIPPED | OUTPATIENT
Start: 2021-02-23 | End: 2021-05-17

## 2021-02-23 NOTE — PROGRESS NOTES
JAVON Hsu DENIED - PA FOR DEXCOM G6 SYSTEM APPROVED.    PLEASE SEND SCRIPT TO [facility]

## 2021-02-26 RX ORDER — INSULIN HUMAN 4 1/1
4 POWDER, METERED RESPIRATORY (INHALATION) 3 TIMES DAILY PRN
Qty: 90 EACH | Refills: 11 | Status: SHIPPED | OUTPATIENT
Start: 2021-02-26 | End: 2021-05-17

## 2021-05-17 ENCOUNTER — OFFICE VISIT (OUTPATIENT)
Dept: FAMILY MEDICINE CLINIC | Facility: CLINIC | Age: 66
End: 2021-05-17

## 2021-05-17 VITALS
DIASTOLIC BLOOD PRESSURE: 66 MMHG | WEIGHT: 152.6 LBS | SYSTOLIC BLOOD PRESSURE: 110 MMHG | BODY MASS INDEX: 27.04 KG/M2 | HEIGHT: 63 IN

## 2021-05-17 DIAGNOSIS — Z00.00 ANNUAL PHYSICAL EXAM: Primary | ICD-10-CM

## 2021-05-17 DIAGNOSIS — Z78.0 POST-MENOPAUSAL: ICD-10-CM

## 2021-05-17 DIAGNOSIS — M19.90 ARTHRITIS: ICD-10-CM

## 2021-05-17 DIAGNOSIS — Z12.31 ENCOUNTER FOR SCREENING MAMMOGRAM FOR MALIGNANT NEOPLASM OF BREAST: ICD-10-CM

## 2021-05-17 PROCEDURE — 99397 PER PM REEVAL EST PAT 65+ YR: CPT | Performed by: NURSE PRACTITIONER

## 2021-05-17 RX ORDER — CELECOXIB 200 MG/1
200 CAPSULE ORAL DAILY
Qty: 90 CAPSULE | Refills: 3 | Status: SHIPPED | OUTPATIENT
Start: 2021-05-17 | End: 2021-06-28

## 2021-05-17 NOTE — PROGRESS NOTES
"Chief Complaint  Annual Exam    Subjective          Bonnie Garcia presents to [facility]  HPI:  Annual physical exam       Objective   Vital Signs:   /66   Ht 160 cm (63\")   Wt 69.2 kg (152 lb 9.6 oz)   BMI 27.03 kg/m²     Physical Exam  Vitals and nursing note reviewed.   Constitutional:       Appearance: Normal appearance. She is well-developed. She is obese.   HENT:      Head: Normocephalic and atraumatic.      Right Ear: Tympanic membrane, ear canal and external ear normal.      Left Ear: Tympanic membrane, ear canal and external ear normal.      Nose: Nose normal.      Mouth/Throat:      Mouth: Mucous membranes are moist.      Pharynx: Oropharynx is clear.   Eyes:      Extraocular Movements: Extraocular movements intact.      Conjunctiva/sclera: Conjunctivae normal.      Pupils: Pupils are equal, round, and reactive to light.   Cardiovascular:      Rate and Rhythm: Normal rate and regular rhythm.      Heart sounds: Normal heart sounds.   Pulmonary:      Effort: Pulmonary effort is normal.      Breath sounds: Normal breath sounds.   Abdominal:      General: Bowel sounds are normal.      Palpations: Abdomen is soft.   Musculoskeletal:         General: Normal range of motion.      Cervical back: Normal range of motion and neck supple.   Skin:     General: Skin is warm.      Capillary Refill: Capillary refill takes less than 2 seconds.   Neurological:      Mental Status: She is alert and oriented to person, place, and time.   Psychiatric:         Behavior: Behavior normal.        Result Review :     Common labs    Common Labsle 7/30/20 7/30/20 7/30/20 7/30/20 7/30/20 2/3/21 2/3/21 2/3/21 2/3/21 2/3/21    1059 1059 1059 1059 1059 1217 1217 1217 1217 1217   Glucose    157 (A)     169 (A)    BUN    10     12    Creatinine    0.48 (A)     0.55 (A)    eGFR Non African Am    130     111    Sodium    138     143    Potassium    4.5     4.2    Chloride    104     105    Calcium   "  9.5     9.7    Albumin    4.30     4.50    Total Bilirubin    0.5     0.6    Alkaline Phosphatase    61     87    AST (SGOT)    15     14    ALT (SGPT)    12     7    WBC 4.53      4.25      Hemoglobin 14.2      14.5      Hematocrit 41.3      42.7      Platelets 286      288      Total Cholesterol     138     263 (A)   Triglycerides     303 (A)     232 (A)   HDL Cholesterol     40     35 (A)   LDL Cholesterol      37     183 (A)   Hemoglobin A1C   8.13 (A)   8.43 (A)       Microalbumin, Urine  <1.2      1.5     (A) Abnormal value                      Assessment and Plan    Diagnoses and all orders for this visit:    1. Annual physical exam (Primary)    2. Encounter for screening mammogram for malignant neoplasm of breast  -     Mammo Screening Digital Tomosynthesis Bilateral With CAD; Future    3. Post-menopausal  -     DEXA Bone Density Axial; Future    4. Arthritis  -     celecoxib (CeleBREX) 200 MG capsule; Take 1 capsule by mouth Daily.  Dispense: 90 capsule; Refill: 3      Continue on current medications as previously prescribed   Radiology will call to schedule bilateral mammogram  Encouraged monthly self breast exams at home  Radiology will call to schedule bone density test and will notify of results when available  Begin Celebrex as prescribed  Educated on possible side effects of this medication including but not limited to increased risk for gastrointestinal bleeding  Encouraged not to take any other NSAIDs including but not limited to Advil, ibuprofen, Motrin, Naprosyn or Aleve while on this medication if may increased risk for gastrointestinal bleeding  Encouraged to take this medication with food in order to reduce GI discomfort  Educated to watch for signs of possible GI bleeding such as dark, black or tarry looking stools  Discussed importance of discontinuing medication immediately for any spontaneous bleeding and seek emergency medical attention  I spent 26 minutes caring for Bonnie on this date of  service. This time includes time spent by me in the following activities:preparing for the visit, reviewing tests, obtaining and/or reviewing a separately obtained history, performing a medically appropriate examination and/or evaluation , counseling and educating the patient/family/caregiver, ordering medications, tests, or procedures and documenting information in the medical record  Follow Up   Return in about 6 months (around 11/17/2021) for Recheck.  Patient was given instructions and counseling regarding her condition or for health maintenance advice. Please see specific information pulled into the AVS if appropriate.         This document has been electronically signed by MERT Escobar on May 17, 2021 12:23 CDT

## 2021-06-11 ENCOUNTER — TELEPHONE (OUTPATIENT)
Dept: FAMILY MEDICINE CLINIC | Facility: CLINIC | Age: 66
End: 2021-06-11

## 2021-06-11 DIAGNOSIS — M85.88 OSTEOPENIA OF LUMBAR SPINE: Primary | ICD-10-CM

## 2021-06-11 RX ORDER — CAL/D3/MAG11/ZINC/COP/MANG/BOR 600 MG-800
1 TABLET ORAL 2 TIMES DAILY WITH MEALS
Qty: 180 TABLET | Refills: 3
Start: 2021-06-11 | End: 2023-03-13

## 2021-06-11 NOTE — TELEPHONE ENCOUNTER
Per MERT Holt, Ms. Garcia has been called with recent DEXA Bone Density Scan results & recommendations.  Continue current medications and follow-up as planned or sooner if any problems.         ----- Message from MERT Escobar sent at 6/11/2021 12:43 PM CDT -----  Bone density test showed osteopenia of the lumbar spine as well as the right femoral neck.  Left femoral neck showed osteoporosis.  I have sent in a referral to the bone health clinic to discuss further treatment options.  They will contact her to schedule an appointment.  In the meantime I would like her to begin an over-the-counter calcium supplement with vitamin D such as Citracal or Caltrate.  She will take this twice daily with meals.

## 2021-06-11 NOTE — TELEPHONE ENCOUNTER
Per MERT Holt, Ms. Garcia has been called with recent Bilateral Screening 3-D Mammogram results & recommendations.  Continue current medications and follow-up as planned or sooner if any problems.         ----- Message from MERT Escobar sent at 6/11/2021 12:43 PM CDT -----  Repeat mammogram yearly.  Please call or send letter.

## 2021-06-28 ENCOUNTER — TELEPHONE (OUTPATIENT)
Dept: FAMILY MEDICINE CLINIC | Facility: CLINIC | Age: 66
End: 2021-06-28

## 2021-06-28 DIAGNOSIS — M19.90 ARTHRITIS: Primary | ICD-10-CM

## 2021-06-28 RX ORDER — MELOXICAM 7.5 MG/1
7.5 TABLET ORAL DAILY
Qty: 30 TABLET | Refills: 1 | Status: SHIPPED | OUTPATIENT
Start: 2021-06-28 | End: 2021-09-27 | Stop reason: SDUPTHER

## 2021-06-28 NOTE — TELEPHONE ENCOUNTER
Bonnie called and said Celebrex is causing her to have bleeding, nose bleeds, and she wanted to know if she could get on something else, like Meloxicam.  She said leave a message if she does not answer the phone.

## 2021-07-17 PROCEDURE — 87635 SARS-COV-2 COVID-19 AMP PRB: CPT | Performed by: NURSE PRACTITIONER

## 2021-07-19 ENCOUNTER — HOSPITAL ENCOUNTER (EMERGENCY)
Facility: HOSPITAL | Age: 66
Discharge: HOME | End: 2021-07-19
Attending: STUDENT IN AN ORGANIZED HEALTH CARE EDUCATION/TRAINING PROGRAM | Admitting: STUDENT IN AN ORGANIZED HEALTH CARE EDUCATION/TRAINING PROGRAM

## 2021-07-19 ENCOUNTER — TELEPHONE (OUTPATIENT)
Dept: FAMILY MEDICINE CLINIC | Facility: CLINIC | Age: 66
End: 2021-07-19

## 2021-07-19 ENCOUNTER — APPOINTMENT (OUTPATIENT)
Dept: GENERAL RADIOLOGY | Facility: HOSPITAL | Age: 66
End: 2021-07-19

## 2021-07-19 VITALS
DIASTOLIC BLOOD PRESSURE: 77 MMHG | RESPIRATION RATE: 18 BRPM | HEIGHT: 63 IN | SYSTOLIC BLOOD PRESSURE: 136 MMHG | TEMPERATURE: 98.1 F | HEART RATE: 88 BPM | OXYGEN SATURATION: 95 % | WEIGHT: 153 LBS | BODY MASS INDEX: 27.11 KG/M2

## 2021-07-19 DIAGNOSIS — R05.9 COUGH: Primary | ICD-10-CM

## 2021-07-19 DIAGNOSIS — R06.02 SHORTNESS OF BREATH: Primary | ICD-10-CM

## 2021-07-19 LAB
ALBUMIN SERPL-MCNC: 4 G/DL (ref 3.5–5.2)
ALBUMIN/GLOB SERPL: 1.4 G/DL
ALP SERPL-CCNC: 135 U/L (ref 39–117)
ALT SERPL W P-5'-P-CCNC: 16 U/L (ref 1–33)
ANION GAP SERPL CALCULATED.3IONS-SCNC: 16 MMOL/L (ref 5–15)
AST SERPL-CCNC: 16 U/L (ref 1–32)
BASOPHILS # BLD AUTO: 0.07 10*3/MM3 (ref 0–0.2)
BASOPHILS NFR BLD AUTO: 1.1 % (ref 0–1.5)
BILIRUB SERPL-MCNC: 0.7 MG/DL (ref 0–1.2)
BUN SERPL-MCNC: 10 MG/DL (ref 8–23)
BUN/CREAT SERPL: 19.2 (ref 7–25)
CALCIUM SPEC-SCNC: 9.3 MG/DL (ref 8.6–10.5)
CHLORIDE SERPL-SCNC: 101 MMOL/L (ref 98–107)
CO2 SERPL-SCNC: 22 MMOL/L (ref 22–29)
CREAT SERPL-MCNC: 0.52 MG/DL (ref 0.57–1)
DEPRECATED RDW RBC AUTO: 44.7 FL (ref 37–54)
EOSINOPHIL # BLD AUTO: 0.24 10*3/MM3 (ref 0–0.4)
EOSINOPHIL NFR BLD AUTO: 3.7 % (ref 0.3–6.2)
ERYTHROCYTE [DISTWIDTH] IN BLOOD BY AUTOMATED COUNT: 13.3 % (ref 12.3–15.4)
FLUAV RNA RESP QL NAA+PROBE: NOT DETECTED
FLUBV RNA RESP QL NAA+PROBE: NOT DETECTED
GFR SERPL CREATININE-BSD FRML MDRD: 118 ML/MIN/1.73
GLOBULIN UR ELPH-MCNC: 2.9 GM/DL
GLUCOSE SERPL-MCNC: 219 MG/DL (ref 65–99)
HCT VFR BLD AUTO: 44.8 % (ref 34–46.6)
HGB BLD-MCNC: 14.7 G/DL (ref 12–15.9)
HOLD SPECIMEN: NORMAL
HOLD SPECIMEN: NORMAL
IMM GRANULOCYTES # BLD AUTO: 0.03 10*3/MM3 (ref 0–0.05)
IMM GRANULOCYTES NFR BLD AUTO: 0.5 % (ref 0–0.5)
LYMPHOCYTES # BLD AUTO: 1.27 10*3/MM3 (ref 0.7–3.1)
LYMPHOCYTES NFR BLD AUTO: 19.6 % (ref 19.6–45.3)
MCH RBC QN AUTO: 29.9 PG (ref 26.6–33)
MCHC RBC AUTO-ENTMCNC: 32.8 G/DL (ref 31.5–35.7)
MCV RBC AUTO: 91.2 FL (ref 79–97)
MONOCYTES # BLD AUTO: 0.6 10*3/MM3 (ref 0.1–0.9)
MONOCYTES NFR BLD AUTO: 9.3 % (ref 5–12)
NEUTROPHILS NFR BLD AUTO: 4.27 10*3/MM3 (ref 1.7–7)
NEUTROPHILS NFR BLD AUTO: 65.8 % (ref 42.7–76)
NRBC BLD AUTO-RTO: 0 /100 WBC (ref 0–0.2)
NT-PROBNP SERPL-MCNC: 45.8 PG/ML (ref 0–900)
PLATELET # BLD AUTO: 199 10*3/MM3 (ref 140–450)
PMV BLD AUTO: 10.6 FL (ref 6–12)
POTASSIUM SERPL-SCNC: 4.2 MMOL/L (ref 3.5–5.2)
PROT SERPL-MCNC: 6.9 G/DL (ref 6–8.5)
QT INTERVAL: 366 MS
QTC INTERVAL: 437 MS
RBC # BLD AUTO: 4.91 10*6/MM3 (ref 3.77–5.28)
SARS-COV-2 RNA RESP QL NAA+PROBE: NOT DETECTED
SODIUM SERPL-SCNC: 139 MMOL/L (ref 136–145)
TROPONIN T SERPL-MCNC: <0.01 NG/ML (ref 0–0.03)
WBC # BLD AUTO: 6.48 10*3/MM3 (ref 3.4–10.8)
WHOLE BLOOD HOLD SPECIMEN: NORMAL

## 2021-07-19 PROCEDURE — 84484 ASSAY OF TROPONIN QUANT: CPT | Performed by: STUDENT IN AN ORGANIZED HEALTH CARE EDUCATION/TRAINING PROGRAM

## 2021-07-19 PROCEDURE — 71045 X-RAY EXAM CHEST 1 VIEW: CPT

## 2021-07-19 PROCEDURE — 93005 ELECTROCARDIOGRAM TRACING: CPT | Performed by: STUDENT IN AN ORGANIZED HEALTH CARE EDUCATION/TRAINING PROGRAM

## 2021-07-19 PROCEDURE — 83880 ASSAY OF NATRIURETIC PEPTIDE: CPT | Performed by: STUDENT IN AN ORGANIZED HEALTH CARE EDUCATION/TRAINING PROGRAM

## 2021-07-19 PROCEDURE — 87636 SARSCOV2 & INF A&B AMP PRB: CPT | Performed by: STUDENT IN AN ORGANIZED HEALTH CARE EDUCATION/TRAINING PROGRAM

## 2021-07-19 PROCEDURE — 93010 ELECTROCARDIOGRAM REPORT: CPT | Performed by: INTERNAL MEDICINE

## 2021-07-19 PROCEDURE — 85025 COMPLETE CBC W/AUTO DIFF WBC: CPT | Performed by: STUDENT IN AN ORGANIZED HEALTH CARE EDUCATION/TRAINING PROGRAM

## 2021-07-19 PROCEDURE — 99283 EMERGENCY DEPT VISIT LOW MDM: CPT

## 2021-07-19 PROCEDURE — 80053 COMPREHEN METABOLIC PANEL: CPT | Performed by: STUDENT IN AN ORGANIZED HEALTH CARE EDUCATION/TRAINING PROGRAM

## 2021-07-19 RX ORDER — ALBUTEROL SULFATE 90 UG/1
2 AEROSOL, METERED RESPIRATORY (INHALATION) EVERY 4 HOURS PRN
Qty: 18 G | Refills: 1 | Status: SHIPPED | OUTPATIENT
Start: 2021-07-19 | End: 2022-05-16

## 2021-07-19 RX ORDER — BENZONATATE 100 MG/1
100 CAPSULE ORAL 3 TIMES DAILY PRN
Qty: 12 CAPSULE | Refills: 0 | Status: SHIPPED | OUTPATIENT
Start: 2021-07-19 | End: 2021-11-15

## 2021-07-19 RX ORDER — ASPIRIN 81 MG/1
324 TABLET, CHEWABLE ORAL ONCE
Status: COMPLETED | OUTPATIENT
Start: 2021-07-19 | End: 2021-07-19

## 2021-07-19 RX ORDER — SODIUM CHLORIDE 0.9 % (FLUSH) 0.9 %
10 SYRINGE (ML) INJECTION AS NEEDED
Status: DISCONTINUED | OUTPATIENT
Start: 2021-07-19 | End: 2021-07-19 | Stop reason: HOSPADM

## 2021-07-19 RX ADMIN — SODIUM CHLORIDE, PRESERVATIVE FREE 10 ML: 5 INJECTION INTRAVENOUS at 06:01

## 2021-07-19 RX ADMIN — ASPIRIN 324 MG: 81 TABLET, CHEWABLE ORAL at 06:00

## 2021-07-19 NOTE — TELEPHONE ENCOUNTER
PATIENT WENT TO THE URGENT CARE 7/18/21 AND THEN WENT TO THE ER A TODAY 7/19/2021 AROUND 5:15. PT STATES THAT SHE WAS ONLY GIVEN COUGH MEDICATION AND ISN'T GETTING ANY RELIEF. PT IS  ASKING IF SHE CAN BE CALLED IN A INHALER DUE TO HER COUGHING SO MUCH SHE CANT CATCH HER BREATHE AND IS HARD TO BREATHE. PT USES [facility].     PATIENT WOULD LIKE A CALL BACK WHEN THE RX IS SENT     PAULINO SOTO   [phone]

## 2021-07-19 NOTE — ED NOTES
"Pt states that she has had a cough since Wednesday. She says that she was tested for Covid a few days ago but \"the girl did a really bad job\". She denies fever, body aches, or chills.      Gina Landin, RN  07/19/21 [id number]    "

## 2021-07-19 NOTE — ED PROVIDER NOTES
Subjective   66-year-old female comes to the ER chief complaint of cough for the last 4 to 5 days.  No chest pain, shortness of breath, wheezing, belly pain, nausea, vomiting.  She has been drinking plenty, but does not have much of an appetite.  Patient denies lightheadedness, weakness, numbness does not smoke, and denies have any history of COPD.  Patient does endorse having an MI multiple years ago and had stents placed.      History provided by:  Patient   used: No        Review of Systems   Constitutional: Positive for appetite change. Negative for activity change, chills, fatigue and fever.   HENT: Negative for congestion and rhinorrhea.    Respiratory: Positive for cough. Negative for chest tightness, shortness of breath and wheezing.    Cardiovascular: Negative for chest pain and palpitations.   Gastrointestinal: Negative for abdominal pain and nausea.   Genitourinary: Negative for dysuria and flank pain.   Skin: Negative for color change and rash.   Neurological: Negative for dizziness and headaches.   Psychiatric/Behavioral: Negative for agitation. The patient is not nervous/anxious.        Past Medical History:   Diagnosis Date   • Chicken pox    • Coronary arteriosclerosis    • Diabetes mellitus (CMS/HCC)     TYPE 2   • Diastasis of muscle    • Fibrocystic breast    • GERD (gastroesophageal reflux disease)    • H/O Hashimoto thyroiditis    • History of echocardiogram 01/01/2007    ef 35-40% diastolic dysfunction   • History of mammogram 04/13/2015   • History of myocardial infarction 2006   • History of Papanicolaou smear of cervix 08/04/2004    negative   • Hyperlipidemia    • Hypertension    • Measles    • Mild concentric left ventricular hypertrophy (LVH)    • Thyroid nodule    • Vitamin D deficiency        Allergies   Allergen Reactions   • Chloromycetin [Chloramphenicol] Unknown (See Comments)     Unknown- young   • Iodinated Diagnostic Agents Hives     sneezing   • Levaquin  [Levofloxacin] Rash       Past Surgical History:   Procedure Laterality Date   • CARDIAC CATHETERIZATION  2016    Successful PTCA and stenting of the mid LAD done with deployment of a drug eluting stent with reduciton of stenosis from 60% to less thna 0% stenosis.FFR evaluation.   • CHOLECYSTECTOMY  10/01/1994   • COLONOSCOPY  2012    Internal grade 1 hemorrhoids in anus. Moderate amount of adhesions that are present in sigmoid colon region. 1 sessile polyp in rectum; removed by cold biopsy polypectomy   • COLONOSCOPY N/A 2018    Procedure: COLONOSCOPY;  Surgeon: Juan Pierre DO;  Location: [facility] ENDOSCOPY;  Service: Gastroenterology   • DIAGNOSTIC LAPAROSCOPY  2001    Right upper quadrant abdominal pain she has had a total of 3   • ENDOSCOPY N/A 2018    Procedure: ESOPHAGOGASTRODUODENOSCOPY;  Surgeon: Juan Pierre DO;  Location: [facility] ENDOSCOPY;  Service: Gastroenterology   • ESOPHAGOSCOPY / EGD  08/10/1994    Esophageal nodule(biopsied) R/O Biliary Microlithiasis   • HERNIA REPAIR  2007     (with parietex mesh.)   • INJECTION OF MEDICATION      Kenalog (2)     • TONSILLECTOMY AND ADENOIDECTOMY  1963   • TUBAL ABDOMINAL LIGATION  2007   • VAGINAL DELIVERY         Family History   Problem Relation Age of Onset   • Diabetes Mother    • Coronary artery disease Mother    • Heart attack Father          at 53 multiple mi's   • Coronary artery disease Other    • Diabetes Other    • Thyroid disease Other    • Hypothyroidism Daughter        Social History     Socioeconomic History   • Marital status: Single     Spouse name: Not on file   • Number of children: 1   • Years of education: Not on file   • Highest education level: Not on file   Tobacco Use   • Smoking status: Former Smoker     Types: Cigarettes   • Smokeless tobacco: Never Used   • Tobacco comment: smoked for 5 years   Substance and Sexual Activity   • Alcohol use: No   • Drug use: No   • Sexual  "activity: Not Currently     Partners: Male     Birth control/protection: Post-menopausal           Objective    Vitals:    07/19/21 0520 07/19/21 0527 07/19/21 0621 07/19/21 0623   BP: 157/83  136/77    BP Location: Right arm      Patient Position: Sitting      Pulse: 93   88   Resp: 18      Temp: 98.2 °F (36.8 °C) 98.1 °F (36.7 °C)     TempSrc: Temporal Oral     SpO2: 99%   95%   Weight:  69.4 kg (153 lb)     Height:  160 cm (63\")         Physical Exam  Vitals and nursing note reviewed.   Constitutional:       General: She is not in acute distress.     Appearance: She is well-developed. She is obese. She is not ill-appearing, toxic-appearing or diaphoretic.   HENT:      Head: Normocephalic.      Right Ear: External ear normal.      Left Ear: External ear normal.   Pulmonary:      Effort: Pulmonary effort is normal. No accessory muscle usage or respiratory distress.      Breath sounds: No decreased breath sounds or wheezing.   Chest:      Chest wall: No tenderness.   Abdominal:      General: Bowel sounds are normal.      Palpations: Abdomen is soft.      Tenderness: There is no abdominal tenderness (deep palpation).   Skin:     General: Skin is warm and dry.      Capillary Refill: Capillary refill takes less than 2 seconds.   Neurological:      Mental Status: She is alert and oriented to person, place, and time. Mental status is at baseline. She is not disoriented.   Psychiatric:         Behavior: Behavior normal.         ECG 12 Lead      Date/Time: 7/19/2021 5:58 AM  Performed by: Tiburcio Coker MD  Authorized by: Tiburcio Coker MD   Interpreted by physician  Rhythm: sinus rhythm  Rate: normal  QRS axis: normal  ST Segments: ST segments normal  T Waves: T waves normal  Clinical impression: normal ECG                 ED Course      Results for orders placed or performed during the hospital encounter of 07/19/21   COVID-19 and FLU A/B PCR - Swab, Nasopharynx    Specimen: Nasopharynx; Swab   Result Value Ref " Range    COVID19 Not Detected Not Detected - Ref. Range    Influenza A PCR Not Detected Not Detected    Influenza B PCR Not Detected Not Detected   Troponin    Specimen: Blood   Result Value Ref Range    Troponin T <0.010 0.000 - 0.030 ng/mL   Comprehensive Metabolic Panel    Specimen: Blood   Result Value Ref Range    Glucose 219 (H) 65 - 99 mg/dL    BUN 10 8 - 23 mg/dL    Creatinine 0.52 (L) 0.57 - 1.00 mg/dL    Sodium 139 136 - 145 mmol/L    Potassium 4.2 3.5 - 5.2 mmol/L    Chloride 101 98 - 107 mmol/L    CO2 22.0 22.0 - 29.0 mmol/L    Calcium 9.3 8.6 - 10.5 mg/dL    Total Protein 6.9 6.0 - 8.5 g/dL    Albumin 4.00 3.50 - 5.20 g/dL    ALT (SGPT) 16 1 - 33 U/L    AST (SGOT) 16 1 - 32 U/L    Alkaline Phosphatase 135 (H) 39 - 117 U/L    Total Bilirubin 0.7 0.0 - 1.2 mg/dL    eGFR Non African Amer 118 >60 mL/min/1.73    Globulin 2.9 gm/dL    A/G Ratio 1.4 g/dL    BUN/Creatinine Ratio 19.2 7.0 - 25.0    Anion Gap 16.0 (H) 5.0 - 15.0 mmol/L   BNP    Specimen: Blood   Result Value Ref Range    proBNP 45.8 0.0 - 900.0 pg/mL   CBC Auto Differential    Specimen: Blood   Result Value Ref Range    WBC 6.48 3.40 - 10.80 10*3/mm3    RBC 4.91 3.77 - 5.28 10*6/mm3    Hemoglobin 14.7 12.0 - 15.9 g/dL    Hematocrit 44.8 34.0 - 46.6 %    MCV 91.2 79.0 - 97.0 fL    MCH 29.9 26.6 - 33.0 pg    MCHC 32.8 31.5 - 35.7 g/dL    RDW 13.3 12.3 - 15.4 %    RDW-SD 44.7 37.0 - 54.0 fl    MPV 10.6 6.0 - 12.0 fL    Platelets 199 140 - 450 10*3/mm3    Neutrophil % 65.8 42.7 - 76.0 %    Lymphocyte % 19.6 19.6 - 45.3 %    Monocyte % 9.3 5.0 - 12.0 %    Eosinophil % 3.7 0.3 - 6.2 %    Basophil % 1.1 0.0 - 1.5 %    Immature Grans % 0.5 0.0 - 0.5 %    Neutrophils, Absolute 4.27 1.70 - 7.00 10*3/mm3    Lymphocytes, Absolute 1.27 0.70 - 3.10 10*3/mm3    Monocytes, Absolute 0.60 0.10 - 0.90 10*3/mm3    Eosinophils, Absolute 0.24 0.00 - 0.40 10*3/mm3    Basophils, Absolute 0.07 0.00 - 0.20 10*3/mm3    Immature Grans, Absolute 0.03 0.00 - 0.05 10*3/mm3     nRBC 0.0 0.0 - 0.2 /100 WBC   Green Top (Gel)   Result Value Ref Range    Extra Tube Hold for add-ons.    Lavender Top   Result Value Ref Range    Extra Tube hold for add-on    Gold Top - SST   Result Value Ref Range    Extra Tube Hold for add-ons.      XR Chest 1 View   Final Result     No acute cardiopulmonary process.      Electronically signed by:  Aliyah Pablo MD  7/19/2021 6:25 AM   CDT Workstation: [id number]              [facility]  Number of Diagnoses or Management Options  Cough: new and requires workup  Diagnosis management comments: Vital signs are stable, afebrile.  Labs are unremarkable.  Chest x-ray shows no acute cardiopulmonary process.  EKG is normal sinus.  Patient able to ambulate and setting well on room air.  On reevaluation, patient is alert and resting comfortably. I discussed the results of the emergency department evaluation.  I recommended primary care follow-up.  Return precautions discussed.      Final diagnoses:   Cough       ED Disposition  ED Disposition     ED Disposition Condition Comment    Discharge Stable           Yanet Robert M, APRN  [address]  [address] [facility] [address]  [place] [address]  [phone]    Schedule an appointment as soon as possible for a visit in 2 days  ER follow up         Medication List      Changed    * benzonatate 200 MG capsule  Commonly known as: TESSALON  Take 1 capsule by mouth 3 (Three) Times a Day As Needed for Cough for up to 10 days.  What changed: Another medication with the same name was added. Make sure you understand how and when to take each.     * benzonatate 100 MG capsule  Commonly known as: TESSALON  Take 1 capsule by mouth 3 (Three) Times a Day As Needed for Cough.  What changed: You were already taking a medication with the same name, and this prescription was added. Make sure you understand how and when to take each.         * This list has 2 medication(s) that are the same as other medications prescribed for you. Read the directions  carefully, and ask your doctor or other care provider to review them with you.               Where to Get Your Medications      These medications were sent to [facility] - [place] [address]    Hours: Monday through Friday 7:00am to 5:00pm Phone: [phone]   · benzonatate 100 MG capsule          Tiburcio Coker MD  07/19/21 [id number]

## 2021-07-21 ENCOUNTER — TELEPHONE (OUTPATIENT)
Dept: ENDOCRINOLOGY | Facility: CLINIC | Age: 66
End: 2021-07-21

## 2021-07-21 NOTE — TELEPHONE ENCOUNTER
Pt was diagnosed with arthritis and will be starting a medication that is going to cause her to have nose bleeds, she is on Repatha, and she is concerned about taking Repatha and Meloxicam, because she is already bruising from the Repatha, and then she will be bleeding as well. She is wanting to discuss.

## 2021-08-12 RX ORDER — EMPAGLIFLOZIN, METFORMIN HYDROCHLORIDE 12.5; 1 MG/1; MG/1
2 TABLET, EXTENDED RELEASE ORAL
Qty: 60 TABLET | Refills: 7 | Status: SHIPPED | OUTPATIENT
Start: 2021-08-12 | End: 2022-03-30 | Stop reason: HOSPADM

## 2021-08-12 RX ORDER — ERGOCALCIFEROL 1.25 MG/1
50000 CAPSULE ORAL
Qty: 12 CAPSULE | Refills: 11 | Status: SHIPPED | OUTPATIENT
Start: 2021-08-12 | End: 2022-09-13 | Stop reason: SDUPTHER

## 2021-09-27 DIAGNOSIS — M19.90 ARTHRITIS: ICD-10-CM

## 2021-09-27 RX ORDER — MELOXICAM 7.5 MG/1
7.5 TABLET ORAL DAILY
Qty: 30 TABLET | Refills: 1 | Status: SHIPPED | OUTPATIENT
Start: 2021-09-27 | End: 2022-03-10 | Stop reason: SDUPTHER

## 2021-10-22 ENCOUNTER — IMMUNIZATION (OUTPATIENT)
Dept: VACCINE CLINIC | Facility: HOSPITAL | Age: 66
End: 2021-10-22

## 2021-10-22 PROCEDURE — 91300 HC SARSCOV02 VAC 30MCG/0.3ML IM: CPT | Performed by: SURGERY

## 2021-10-22 PROCEDURE — 0004A ADM SARSCOV2 30MCG/0.3ML BOOSTER: CPT | Performed by: SURGERY

## 2021-11-08 RX ORDER — EVOLOCUMAB 140 MG/ML
140 INJECTION, SOLUTION SUBCUTANEOUS
Qty: 2 ML | Refills: 10 | Status: SHIPPED | OUTPATIENT
Start: 2021-11-08 | End: 2022-09-27

## 2021-11-15 ENCOUNTER — LAB (OUTPATIENT)
Dept: LAB | Facility: HOSPITAL | Age: 66
End: 2021-11-15

## 2021-11-15 ENCOUNTER — OFFICE VISIT (OUTPATIENT)
Dept: FAMILY MEDICINE CLINIC | Facility: CLINIC | Age: 66
End: 2021-11-15

## 2021-11-15 VITALS
HEART RATE: 75 BPM | WEIGHT: 154 LBS | DIASTOLIC BLOOD PRESSURE: 66 MMHG | OXYGEN SATURATION: 98 % | SYSTOLIC BLOOD PRESSURE: 122 MMHG | BODY MASS INDEX: 27.29 KG/M2 | HEIGHT: 63 IN

## 2021-11-15 DIAGNOSIS — E78.00 PURE HYPERCHOLESTEROLEMIA: ICD-10-CM

## 2021-11-15 DIAGNOSIS — E11.69 TYPE 2 DIABETES MELLITUS WITH DIABETIC CARDIOMYOPATHY (HCC): ICD-10-CM

## 2021-11-15 DIAGNOSIS — I43 TYPE 2 DIABETES MELLITUS WITH DIABETIC CARDIOMYOPATHY (HCC): ICD-10-CM

## 2021-11-15 DIAGNOSIS — I10 PRIMARY HYPERTENSION: Primary | ICD-10-CM

## 2021-11-15 DIAGNOSIS — R26.89 LOSS OF BALANCE: ICD-10-CM

## 2021-11-15 LAB
25(OH)D3 SERPL-MCNC: 40.9 NG/ML (ref 30–100)
ALBUMIN SERPL-MCNC: 4.4 G/DL (ref 3.5–5.2)
ALBUMIN UR-MCNC: 1.2 MG/DL
ALBUMIN/GLOB SERPL: 1.7 G/DL
ALP SERPL-CCNC: 84 U/L (ref 39–117)
ALT SERPL W P-5'-P-CCNC: 16 U/L (ref 1–33)
ANION GAP SERPL CALCULATED.3IONS-SCNC: 8.7 MMOL/L (ref 5–15)
AST SERPL-CCNC: 15 U/L (ref 1–32)
BASOPHILS # BLD AUTO: 0.05 10*3/MM3 (ref 0–0.2)
BASOPHILS NFR BLD AUTO: 1 % (ref 0–1.5)
BILIRUB SERPL-MCNC: 0.6 MG/DL (ref 0–1.2)
BUN SERPL-MCNC: 7 MG/DL (ref 8–23)
BUN/CREAT SERPL: 14.9 (ref 7–25)
CALCIUM SPEC-SCNC: 9.5 MG/DL (ref 8.6–10.5)
CHLORIDE SERPL-SCNC: 104 MMOL/L (ref 98–107)
CHOLEST SERPL-MCNC: 253 MG/DL (ref 0–200)
CO2 SERPL-SCNC: 27.3 MMOL/L (ref 22–29)
CREAT SERPL-MCNC: 0.47 MG/DL (ref 0.57–1)
CREAT UR-MCNC: 36.9 MG/DL
DEPRECATED RDW RBC AUTO: 44.4 FL (ref 37–54)
EOSINOPHIL # BLD AUTO: 0.14 10*3/MM3 (ref 0–0.4)
EOSINOPHIL NFR BLD AUTO: 2.9 % (ref 0.3–6.2)
ERYTHROCYTE [DISTWIDTH] IN BLOOD BY AUTOMATED COUNT: 13.2 % (ref 12.3–15.4)
GFR SERPL CREATININE-BSD FRML MDRD: 133 ML/MIN/1.73
GLOBULIN UR ELPH-MCNC: 2.6 GM/DL
GLUCOSE SERPL-MCNC: 256 MG/DL (ref 65–99)
HBA1C MFR BLD: 8.3 % (ref 4.8–5.6)
HCT VFR BLD AUTO: 44.3 % (ref 34–46.6)
HDLC SERPL-MCNC: 43 MG/DL (ref 40–60)
HGB BLD-MCNC: 15 G/DL (ref 12–15.9)
IMM GRANULOCYTES # BLD AUTO: 0.07 10*3/MM3 (ref 0–0.05)
IMM GRANULOCYTES NFR BLD AUTO: 1.4 % (ref 0–0.5)
LDLC SERPL CALC-MCNC: 157 MG/DL (ref 0–100)
LDLC/HDLC SERPL: 3.55 {RATIO}
LYMPHOCYTES # BLD AUTO: 1.24 10*3/MM3 (ref 0.7–3.1)
LYMPHOCYTES NFR BLD AUTO: 25.6 % (ref 19.6–45.3)
MCH RBC QN AUTO: 31.2 PG (ref 26.6–33)
MCHC RBC AUTO-ENTMCNC: 33.9 G/DL (ref 31.5–35.7)
MCV RBC AUTO: 92.1 FL (ref 79–97)
MICROALBUMIN/CREAT UR: 32.5 MG/G
MONOCYTES # BLD AUTO: 0.39 10*3/MM3 (ref 0.1–0.9)
MONOCYTES NFR BLD AUTO: 8 % (ref 5–12)
NEUTROPHILS NFR BLD AUTO: 2.96 10*3/MM3 (ref 1.7–7)
NEUTROPHILS NFR BLD AUTO: 61.1 % (ref 42.7–76)
NRBC BLD AUTO-RTO: 0 /100 WBC (ref 0–0.2)
PLATELET # BLD AUTO: 278 10*3/MM3 (ref 140–450)
PMV BLD AUTO: 10.9 FL (ref 6–12)
POTASSIUM SERPL-SCNC: 4 MMOL/L (ref 3.5–5.2)
PROT SERPL-MCNC: 7 G/DL (ref 6–8.5)
RBC # BLD AUTO: 4.81 10*6/MM3 (ref 3.77–5.28)
SODIUM SERPL-SCNC: 140 MMOL/L (ref 136–145)
TRIGL SERPL-MCNC: 286 MG/DL (ref 0–150)
TSH SERPL DL<=0.05 MIU/L-ACNC: 1.7 UIU/ML (ref 0.27–4.2)
VIT B12 BLD-MCNC: >2000 PG/ML (ref 211–946)
VLDLC SERPL-MCNC: 53 MG/DL (ref 5–40)
WBC # BLD AUTO: 4.85 10*3/MM3 (ref 3.4–10.8)

## 2021-11-15 PROCEDURE — 83036 HEMOGLOBIN GLYCOSYLATED A1C: CPT | Performed by: INTERNAL MEDICINE

## 2021-11-15 PROCEDURE — 80053 COMPREHEN METABOLIC PANEL: CPT | Performed by: INTERNAL MEDICINE

## 2021-11-15 PROCEDURE — 82043 UR ALBUMIN QUANTITATIVE: CPT | Performed by: INTERNAL MEDICINE

## 2021-11-15 PROCEDURE — 82570 ASSAY OF URINE CREATININE: CPT | Performed by: INTERNAL MEDICINE

## 2021-11-15 PROCEDURE — 36415 COLL VENOUS BLD VENIPUNCTURE: CPT | Performed by: INTERNAL MEDICINE

## 2021-11-15 PROCEDURE — 85025 COMPLETE CBC W/AUTO DIFF WBC: CPT | Performed by: INTERNAL MEDICINE

## 2021-11-15 PROCEDURE — 82607 VITAMIN B-12: CPT | Performed by: INTERNAL MEDICINE

## 2021-11-15 PROCEDURE — 84443 ASSAY THYROID STIM HORMONE: CPT | Performed by: INTERNAL MEDICINE

## 2021-11-15 PROCEDURE — 80061 LIPID PANEL: CPT | Performed by: INTERNAL MEDICINE

## 2021-11-15 PROCEDURE — 99213 OFFICE O/P EST LOW 20 MIN: CPT | Performed by: NURSE PRACTITIONER

## 2021-11-15 PROCEDURE — 82306 VITAMIN D 25 HYDROXY: CPT | Performed by: INTERNAL MEDICINE

## 2021-11-15 RX ORDER — MECLIZINE HCL 12.5 MG/1
12.5 TABLET ORAL 3 TIMES DAILY PRN
Qty: 90 TABLET | Refills: 3 | Status: SHIPPED | OUTPATIENT
Start: 2021-11-15

## 2021-11-15 NOTE — PROGRESS NOTES
"Chief Complaint  Diabetes ( 6 MONTH CHECK UP) and Hypertension    Subjective  Follow-up for high blood pressure, high cholesterol diabetes.  Over the past several years has been having noticeable bouts of loss of balance.  \"Not really dizzy but I will just lose my balance.  I can be walking in a straight line and then I just veer off.\"        Bonnie Garcia presents to [facility] - [place]  HPI: annual physical exam    Hypertension  This is a chronic problem. The current episode started more than 1 year ago. The problem is unchanged. The problem is controlled. Pertinent negatives include no chest pain, orthopnea, peripheral edema or shortness of breath. There are no associated agents to hypertension. Risk factors for coronary artery disease include diabetes mellitus, dyslipidemia, obesity and post-menopausal state. Past treatments include angiotensin blockers and beta blockers. The current treatment provides significant improvement. There are no compliance problems.    Hyperlipidemia  This is a chronic problem. The current episode started more than 1 year ago. The problem is controlled. Recent lipid tests were reviewed and are normal. Exacerbating diseases include obesity. Factors aggravating her hyperlipidemia include fatty foods. Associated symptoms include myalgias. Pertinent negatives include no chest pain or shortness of breath. Current antihyperlipidemic treatment includes statins, ezetimibe and fibric acid derivatives (Repatha). The current treatment provides significant improvement of lipids. There are no compliance problems.  Risk factors for coronary artery disease include diabetes mellitus, dyslipidemia, hypertension, obesity and post-menopausal.   Diabetes  She presents for her follow-up diabetic visit. She has type 2 diabetes mellitus. Her disease course has been stable. There are no hypoglycemic associated symptoms. Pertinent negatives for hypoglycemia " "include no confusion. Pertinent negatives for diabetes include no chest pain. There are no hypoglycemic complications. Symptoms are stable. Diabetic complications include heart disease. Risk factors for coronary artery disease include diabetes mellitus, dyslipidemia, hypertension, family history, obesity and post-menopausal. Current diabetic treatment includes oral agent (triple therapy). She is compliant with treatment all of the time. Her weight is stable. She is following a generally healthy diet. Meal planning includes avoidance of concentrated sweets. Her home blood glucose trend is fluctuating minimally. An ACE inhibitor/angiotensin II receptor blocker is being taken.       Objective   Vital Signs:   /66   Pulse 75   Ht 160 cm (63\")   Wt 69.9 kg (154 lb)   SpO2 98%   BMI 27.28 kg/m²     Physical Exam  Vitals and nursing note reviewed.   Constitutional:       Appearance: Normal appearance. She is well-developed. She is obese.   HENT:      Head: Normocephalic.      Right Ear: External ear normal.      Left Ear: External ear normal.      Mouth/Throat:      Mouth: Mucous membranes are moist.   Eyes:      Pupils: Pupils are equal, round, and reactive to light.   Cardiovascular:      Rate and Rhythm: Normal rate and regular rhythm.      Heart sounds: Normal heart sounds.   Pulmonary:      Effort: Pulmonary effort is normal.      Breath sounds: Normal breath sounds.   Abdominal:      General: Bowel sounds are normal.      Palpations: Abdomen is soft.   Musculoskeletal:         General: Normal range of motion.      Cervical back: Normal range of motion and neck supple.   Feet:      Right foot:      Protective Sensation: 10 sites tested. 10 sites sensed.      Skin integrity: Dry skin present.      Toenail Condition: Right toenails are long. Fungal disease present.     Left foot:      Protective Sensation: 10 sites tested. 10 sites sensed.      Skin integrity: Dry skin present.      Toenail Condition: Left " toenails are long. Fungal disease present.     Comments: Diabetic foot exam:   Left: Filament test present   Pulses Dorsalis Pedis:  present   Reflexes 2+    Vibratory sensation normal   Proprioception normal   Sharp/dull discrimination normal       Right: Filament test present   Pulses Dorsalis Pedis:  present   Reflexes 2+    Vibratory sensation normal   Proprioception normal   Sharp/dull discrimination normal    Skin:     General: Skin is warm.      Capillary Refill: Capillary refill takes less than 2 seconds.   Neurological:      Mental Status: She is alert and oriented to person, place, and time.   Psychiatric:         Behavior: Behavior normal.        Result Review :     Common labs    Common Labsle 2/3/21 2/3/21 2/3/21 2/3/21 2/3/21 7/19/21 7/19/21    1217 1217 1217 1217 1217 0541 0541   Glucose    169 (A)   219 (A)   BUN    12   10   Creatinine    0.55 (A)   0.52 (A)   eGFR Non African Am    111   118   Sodium    143   139   Potassium    4.2   4.2   Chloride    105   101   Calcium    9.7   9.3   Albumin    4.50   4.00   Total Bilirubin    0.6   0.7   Alkaline Phosphatase    87   135 (A)   AST (SGOT)    14   16   ALT (SGPT)    7   16   WBC  4.25    6.48    Hemoglobin  14.5    14.7    Hematocrit  42.7    44.8    Platelets  288    199    Total Cholesterol     263 (A)     Triglycerides     232 (A)     HDL Cholesterol     35 (A)     LDL Cholesterol      183 (A)     Hemoglobin A1C 8.43 (A)         Microalbumin, Urine   1.5       (A) Abnormal value                      Assessment and Plan    Diagnoses and all orders for this visit:    1. Primary hypertension (Primary)    2. Pure hypercholesterolemia    3. Type 2 diabetes mellitus with diabetic cardiomyopathy (HCC)    4. Loss of balance  -     meclizine (ANTIVERT) 12.5 MG tablet; Take 1 tablet by mouth 3 (Three) Times a Day As Needed for Dizziness.  Dispense: 90 tablet; Refill: 3  -     CT Head Without Contrast; Future    1.  Primary hypertension:  Continue  olmesartan as previously prescribed  Reduce sodium intake to no more than 1500 mg/day  Avoid adding extra salt to food when cooking    2.  Pure hypercholesterolemia:  Continue on fenofibrate as previously prescribed  Continue on Vascepa as previously prescribed  Continue to adhere to a diet low in saturated fats    3.  Type 2 diabetes mellitus with diabetic cardiomyopathy:  Continue on Januvia as previously prescribed  Reduce concentrated sweets and carbohydrates in diet    4.  Loss of balance:  Continue on meclizine as previously prescribed and refill prescription sent to pharmacy  Radiology will call to schedule CT of the head and will notify of results when available  Emergency medical treatment for any new or worsening headache, thoughts of    I spent 21 minutes caring for Bonnie on this date of service. This time includes time spent by me in the following activities:preparing for the visit, reviewing tests, obtaining and/or reviewing a separately obtained history, performing a medically appropriate examination and/or evaluation , counseling and educating the patient/family/caregiver, ordering medications, tests, or procedures and documenting information in the medical record  Follow Up   Return in about 6 months (around 5/15/2022) for Annual physical.  Patient was given instructions and counseling regarding her condition or for health maintenance advice. Please see specific information pulled into the AVS if appropriate.           This document has been electronically signed by MERT Escobar on November 15, 2021 11:08 CST

## 2021-11-16 ENCOUNTER — OFFICE VISIT (OUTPATIENT)
Dept: ENDOCRINOLOGY | Facility: CLINIC | Age: 66
End: 2021-11-16

## 2021-11-16 ENCOUNTER — SPECIALTY PHARMACY (OUTPATIENT)
Dept: ENDOCRINOLOGY | Facility: CLINIC | Age: 66
End: 2021-11-16

## 2021-11-16 VITALS
DIASTOLIC BLOOD PRESSURE: 60 MMHG | SYSTOLIC BLOOD PRESSURE: 130 MMHG | HEART RATE: 74 BPM | OXYGEN SATURATION: 98 % | HEIGHT: 63 IN | BODY MASS INDEX: 27.29 KG/M2 | WEIGHT: 154 LBS

## 2021-11-16 DIAGNOSIS — E11.59 HYPERTENSION ASSOCIATED WITH DIABETES (HCC): ICD-10-CM

## 2021-11-16 DIAGNOSIS — E53.8 B12 DEFICIENCY: ICD-10-CM

## 2021-11-16 DIAGNOSIS — R80.9 GRADE A2 ALBUMINURIA: ICD-10-CM

## 2021-11-16 DIAGNOSIS — E11.69 TYPE 2 DIABETES MELLITUS WITH DIABETIC CARDIOMYOPATHY (HCC): Primary | ICD-10-CM

## 2021-11-16 DIAGNOSIS — E11.69 MIXED DIABETIC HYPERLIPIDEMIA ASSOCIATED WITH TYPE 2 DIABETES MELLITUS (HCC): ICD-10-CM

## 2021-11-16 DIAGNOSIS — I43 TYPE 2 DIABETES MELLITUS WITH DIABETIC CARDIOMYOPATHY (HCC): Primary | ICD-10-CM

## 2021-11-16 DIAGNOSIS — E78.2 MIXED DIABETIC HYPERLIPIDEMIA ASSOCIATED WITH TYPE 2 DIABETES MELLITUS (HCC): ICD-10-CM

## 2021-11-16 DIAGNOSIS — I15.2 HYPERTENSION ASSOCIATED WITH DIABETES (HCC): ICD-10-CM

## 2021-11-16 DIAGNOSIS — E55.9 VITAMIN D DEFICIENCY: ICD-10-CM

## 2021-11-16 PROCEDURE — 99214 OFFICE O/P EST MOD 30 MIN: CPT | Performed by: INTERNAL MEDICINE

## 2021-11-16 NOTE — PROGRESS NOTES
" Bonnie Garcia is a 66 y.o. female who presents for  evaluation of   Chief Complaint   Patient presents with   • Diabetes     t2       HPI      Duration/Timing:    Age at onset of diabetes: 58 years      Severity (Complications/Hospitalizations)  Secondary Macrovascular Complications:  CAD, No CVA, No PAD  has 50 to 50% LAD     PTCI , stent x 5    2 in proximal LAD    1 in circumflex    may need heart surgery   Secondary Microvascular Complications:  No Diabetic Nephropathy, No Diabetic Retinopathy, No Diabetic Neuropathy    Context  Diabetes Regimen:  Oral Medications     Associated Signs/Symptoms  Imbalance     =============      Also h o thyroid nodule  location - right  timing constant  not progressive  severity - low -  bx , Sept 2008 - benign   biochemically euthyroid , off synthroid but used to be on 25 mcgs daily     thyroid US personally reviewed    Sept 2008    1.8 x 1.7 x 1.4 , right , solid , heterogeneous, hypoechoic.     Repeat US June 2013    r solid nodule stable in size, echogenicity consistent w Hashimoto's     PE    /60   Pulse 74   Ht 160 cm (63\")   Wt 69.9 kg (154 lb)   SpO2 98%   BMI 27.28 kg/m²   AOx3  No visible goiter  Normal Respiratory Effort , Lung CTA  RRR  No Edema    Labs    Lab Results   Component Value Date    WBC 4.85 11/15/2021    HGB 15.0 11/15/2021    HCT 44.3 11/15/2021    MCV 92.1 11/15/2021     11/15/2021     Lab Results   Component Value Date    GLUCOSE 256 (H) 11/15/2021    BUN 7 (L) 11/15/2021    CREATININE 0.47 (L) 11/15/2021    EGFRIFNONA 133 11/15/2021    BCR 14.9 11/15/2021    K 4.0 11/15/2021    CO2 27.3 11/15/2021    CALCIUM 9.5 11/15/2021    ALBUMIN 4.40 11/15/2021    AST 15 11/15/2021    ALT 16 11/15/2021           Assessment/Plan       ICD-10-CM ICD-9-CM   1. Type 2 diabetes mellitus with diabetic cardiomyopathy (HCC)  E11.69 250.80    I43 425.7   2. Hypertension associated with diabetes (HCC)  E11.59 250.80    I15.2 401.9   3. Mixed diabetic " "hyperlipidemia associated with type 2 diabetes mellitus (HCC)  E11.69 250.80    E78.2 272.2   4. Vitamin D deficiency  E55.9 268.9   5. B12 deficiency  E53.8 266.2         Glycemic Management:      Lab Results   Component Value Date    HGBA1C 8.30 (H) 11/15/2021    HGBA1C 8.43 (H) 02/03/2021    HGBA1C 8.13 (H) 07/30/2020     Lab Results   Component Value Date    MICROALBUR 1.2 11/15/2021    CREATININE 0.47 (L) 11/15/2021         Diabetes ----    On synjardy xr  and januvia       [facility] jan[facility]ia to [facility]      Lipid Management  Lab Results   Component Value Date    CHOL 253 (H) 11/15/2021    CHOL 263 (H) 02/03/2021    CHOL 138 07/30/2020     Lab Results   Component Value Date    TRIG 286 (H) 11/15/2021    TRIG 232 (H) 02/03/2021    TRIG 303 (H) 07/30/2020     Lab Results   Component Value Date    HDL 43 11/15/2021    HDL 35 (L) 02/03/2021    HDL 40 07/30/2020       Lab Results   Component Value Date     (H) 11/15/2021     (H) 02/03/2021    LDL 37 07/30/2020     The 10-year ASCVD risk score (Oakhurstvanessa DASILVA Jr., et al., 2013) is: 17.8%    Values used to calculate the score:      Age: 66 years      Sex: Female      Is Non- : No      Diabetic: Yes      Tobacco smoker: No      Systolic Blood Pressure: 130 mmHg      Is BP treated: Yes      HDL Cholesterol: 43 mg/dL      Total Cholesterol: 253 mg/dL      LDL elevated    On fenofibrate   on vascepa ( intolerant to lovaza )    Tried  crestor 5 mg but developed myalgias. Not able to tolerate even every other day   has tried lipitor, zocor, livalo, fluvastatin since 2010 and retried in 2017 and 2018  but hasn't been able to tolerate due to myalgias     Presently only on zetia 10 mg daily - no longer needed     On repatha   Late refill     Blood Pressure Management:    /60   Pulse 74   Ht 160 cm (63\")   Wt 69.9 kg (154 lb)   SpO2 98%   BMI 27.28 kg/m²     on coreg cr 20 mg daily and  losartan 25 mg   was on benicar ,  off ACEi due to " cough    edema left > r , use compression stockings    No longer a problem   Microvascular Complication Monitoring:  No Microalbuminuria, Date of last Microalbumin Assessment 10/08/2015, No Diabetic Retinopathy, No Diabetic     +  neuropathy   Meralgia paresthetica  Start neurontin 300 mg tid had to back off to 100 mg po bid - can't tolerate , doing better    Lab Results   Component Value Date    MALBCRERATIO 32.5 11/15/2021    MALBCRERATIO 47.2 02/03/2021     Moderate albuminuria   Prefers no more meds  Adding ozempic         Weight Changes     Add contrave to lifestyle changes - felt she was going to die    Add belviq, side effects      Bone Health  Vit D 50 th weekly and levels are nl   Other Diabetes Related Aspects  Right Thyroid nodule      R nodule , dimensions in HPI , stable from 2008 to 2013     she has Hashimoto's proven on US and + TPO ab, euthyroid June 2013  used to be on Lt4 at 25 mcgs but d/c years ago  follow TSH - nl     Lab Results   Component Value Date    TSH 1.700 11/15/2021         --    10-15  nl B12 but low normal, start b12     having some unsteadiness and numbness    Lab Results   Component Value Date    [id number] >2,000 (H) 11/15/2021     Please take in addition to MVI    No orders of the defined types were placed in this encounter.

## 2021-11-18 NOTE — PROGRESS NOTES
Specialty Pharmacy Patient Management Program  Endocrinology Initial Assessment       Bonnie Garcia is a 66 y.o. female with Type 2 Diabetes seen by an Endocrinology provider and enrolled in the Endocrinology Patient Management program offered by [facility].  An initial outreach was conducted, including assessment of therapy appropriateness and specialty medication education for Repatha, Trulicity, Vascepa, and Synjardy. The patient was introduced to services offered by [facility], including: regular assessments, refill coordination, curbside pick-up or mail order delivery options, prior authorization maintenance, and financial assistance programs as applicable. The patient was also provided with contact information for the pharmacy team.     Insurance Coverage & Financial Support  BC, we have uploaded coupon cards for all of the medications listed above    Relevant Past Medical History and Comorbidities  Relevant medical history and concomitant health conditions were discussed with the patient. The patient's chart has been reviewed for relevant past medical history and comorbid health conditions and updated as necessary.   Past Medical History:   Diagnosis Date   • Chicken pox    • Coronary arteriosclerosis    • Diabetes mellitus (HCC)     TYPE 2   • Diastasis of muscle    • Fibrocystic breast    • GERD (gastroesophageal reflux disease)    • H/O Hashimoto thyroiditis    • History of echocardiogram 01/01/2007    ef 35-40% diastolic dysfunction   • History of mammogram 04/13/2015   • History of myocardial infarction 2006   • History of Papanicolaou smear of cervix 08/04/2004    negative   • Hyperlipidemia    • Hypertension    • Measles    • Mild concentric left ventricular hypertrophy (LVH)    • Thyroid nodule    • Vitamin D deficiency      Social History     Socioeconomic History   • Marital status: Single   • Number of children: 1   Tobacco Use   • Smoking  status: Former Smoker     Types: Cigarettes   • Smokeless tobacco: Never Used   • Tobacco comment: smoked for 5 years   Substance and Sexual Activity   • Alcohol use: No   • Drug use: No   • Sexual activity: Not Currently     Partners: Male     Birth control/protection: Post-menopausal       Allergies  Known allergies and reactions were discussed with the patient. The patient's chart has been reviewed for  allergy information and updated as necessary.   Chloromycetin [chloramphenicol], Iodinated diagnostic agents, and Levaquin [levofloxacin]    Current Medication List  This medication list has been reviewed with the patient and evaluated for any interactions or necessary modifications/recommendations, and updated to include all prescription medications, OTC medications, and supplements the patient is currently taking.  This list reflects what is contained in the patient's profile, which has also been marked as reviewed to communicate to other providers it is the most up to date version of the patient's current medication therapy.     Current Outpatient Medications:   •  albuterol sulfate  (90 Base) MCG/ACT inhaler, Inhale 2 puffs Every 4 (Four) Hours As Needed for Wheezing or Shortness of Air., Disp: 18 g, Rfl: 1  •  aspirin 81 MG EC tablet, Take 324 mg by mouth every night at bedtime. Pt take 4-81 mg tablet at bedtime., Disp: , Rfl:   •  Calcium Carbonate-Vit D-Min (Caltrate 600+D Plus Minerals) 600-800 MG-UNIT tablet, Take 1 capsule by mouth 2 (Two) Times a Day With Meals., Disp: 180 tablet, Rfl: 3  •  carvedilol CR (COREG CR) 20 MG 24 hr capsule, Take 1 capsule by mouth Daily., Disp: 30 capsule, Rfl: 11  •  Cyanocobalamin (B-12) 500 MCG sublingual tablet, 500 mcgs under the tongue every other day (Patient taking differently: Daily. 500 mcgs under the tongue daily), Disp: 15 tablet, Rfl: 11  •  Dulaglutide 0.75 MG/0.5ML solution pen-injector, Inject 0.75 mg (1 pen) under the skin into the appropriate area  as directed 1 (One) Time Per Week., Disp: 2 mL, Rfl: 11  •  Empagliflozin-metFORMIN HCl ER (Synjardy XR) 12.5-1000 MG tablet sustained-release 24 hour, Take 2 tablets by mouth Daily With Breakfast., Disp: 60 tablet, Rfl: 7  •  Evolocumab (Repatha SureClick) solution auto-injector SureClick injection, Inject 1 mL under the skin into the appropriate area as directed Every 14 (Fourteen) Days., Disp: 2 mL, Rfl: 10  •  fenofibrate (TRICOR) 145 MG tablet, Take 1 tablet by mouth Daily., Disp: 90 tablet, Rfl: 3  •  glucose blood test strip, 1 each by Other route As Needed. Use as instructed, Disp: , Rfl:   •  icosapent ethyl (Vascepa) 1 g capsule capsule, Take 2 capsules by mouth 2 (Two) Times a Day With Meals., Disp: 120 capsule, Rfl: 11  •  losartan (COZAAR) 25 MG tablet, Take 1 tablet by mouth Daily., Disp: 90 tablet, Rfl: 3  •  meclizine (ANTIVERT) 12.5 MG tablet, Take 1 tablet by mouth 3 (Three) Times a Day As Needed for Dizziness., Disp: 90 tablet, Rfl: 3  •  meloxicam (MOBIC) 7.5 MG tablet, Take 1 tablet by mouth Daily **Take with food**, Disp: 30 tablet, Rfl: 1  •  nitroglycerin (NITRODUR) 0.2 MG/HR patch, Place 1 patch on the skin as directed by provider Daily at 7AM and remove nightly at 7PM., Disp: 30 each, Rfl: 11  •  prasugrel (EFFIENT) 10 MG tablet, Take 1 tablet by mouth Daily., Disp: 90 tablet, Rfl: 2  •  ranolazine (RANEXA) 1000 MG 12 hr tablet, Take 1 tablet by mouth 2 (two) times a day., Disp: 180 tablet, Rfl: 2  •  spironolactone (ALDACTONE) 25 MG tablet, Take 1 tablet by mouth once daily, Disp: 30 tablet, Rfl: 12  •  vitamin D (ERGOCALCIFEROL) 1.25 MG (47418 UT) capsule capsule, Take 1 capsule by mouth Every 7 (Seven) Days., Disp: 12 capsule, Rfl: 11    Drug Interactions  None    Recommended Medications Assessment  • Aspirin - Currently Taking  • Statin - Repatha  • ACEi/ARB - Currently Taking     Relevant Laboratory Values  A1C Last 3 Results    HGBA1C Last 3 Results 2/3/21 11/15/21   Hemoglobin A1C  8.43 (A) 8.30 (A)   (A) Abnormal value            Lab Results   Component Value Date    HGBA1C 8.30 (H) 11/15/2021     Lab Results   Component Value Date    GLUCOSE 256 (H) 11/15/2021    CALCIUM 9.5 11/15/2021     11/15/2021    K 4.0 11/15/2021    CO2 27.3 11/15/2021     11/15/2021    BUN 7 (L) 11/15/2021    CREATININE 0.47 (L) 11/15/2021    EGFRIFNONA 133 11/15/2021    BCR 14.9 11/15/2021    ANIONGAP 8.7 11/15/2021     Lab Results   Component Value Date    CHOL 253 (H) 11/15/2021    CHLPL 196 06/27/2016    TRIG 286 (H) 11/15/2021    HDL 43 11/15/2021     (H) 11/15/2021     Microalbumin    Microalbumin 2/3/21 11/15/21   Microalbumin, Urine 1.5 1.2             Initial Education Provided for Specialty Medication  The patient has been provided with the following education and any applicable administration techniques (i.e. self-injection) have been demonstrated for the therapies indicated. All questions and concerns have been addressed prior to the patient receiving the medication, and the patient has verbalized understanding of the education and any materials provided.  Additional patient education shall be provided and documented upon request by the patient, provider or payer.      Vascepa (Icosapent Ethyl):   • This medication is used to lower triglycerides. It may also be used in some patients to reduce the risk of heart attack, stroke, or other heart problems  • Administer with meals. Swallow whole; do not break, crush, dissolve, or chew capsules  • Notify your doctor if you are allergic to fish or shellfish  • Certain types of abnormal heartbeat (atrial fibrillation or atrial flutter) have happened. The risk is increased in patients who have these abnormal heartbeats in the past.   • Other side effects may include bleeding or bruising, muscle pain, and constipation  Trulicity (dulaglutide)   · Discussed the medication's MOA and that it helps you feel full, helps your body release more insulin  and helps your body make less sugar after meals.  · Also discussed that N/V/D tend to be the most common adverse effects, especially if larger meals are eaten.  Encouraged smaller meals throughout the day.  · Do not start the medication if you have h/o pancreatitis or thyroid cancer.  · Administer once weekly with or without food. Take missed dose as soon as remember and if it is less than 3 days until the next dose, skip the missed dose and get back on track; do not take 2 doses or extra doses.  · Each pen contains one dose. Store pens in the refrigerator until use, pens are stable at room temperature for 14 days.   · Administration: Make sure the pen is locked, pull the base cap straight off and throw it away in household trash. Place the clear base flat and firmly against your skin at the injection site and unlock by turning the lock ring. Press and hold the green inject button, you will hear a loud click. Continue holding firmly against skin until second click in about 5-10 seconds then  remove pen from skin and discard in sharps container.   Synjardy XR (metformin/empagliflozin)   • Discussed the medication's MOA and that it may cause more frequent urination particularly upon starting the medication  • Take one tablet in the morning with or without food    • Encouraged to drink plenty of water as to not get dehydrated especially in warmer weather or with activity  • Also discussed there may be an increased incidence of UTIs or yeast infections and to monitor for signs/symptoms  • Encouraged to take with food as it may cause N/V/D if taken on empty stomach  Repatha (evolocumab)  · Administer the dose subcutaneously into the abdomen, thighs, or upper arms. Use a new autoinjector for each injection.   · Store the medication in the refrigerator at home. Allow the medication to warm to room temperature for 30-45 minutes before administration. Do not heat or shake the medication.   · Side effects may include  nasopharyngitis, upper respiratory infection, influenza, back pain, and injection site reactions.   · If you miss a dose, administer as soon as possible if there are more than 7 days before the next scheduled dose. If 7 days or less remain, skip the missed dose.     Adherence and Self-Administration  • Barriers to Patient Adherence and/or Self-Administration: Patient is very good at remembering her medication    • Methods for Supporting Patient Adherence and/or Self-Administration: Copay cards help ensure she gets them    Goals of Therapy  • Patient Goals of Therapy: take medication on time  • Clinical Goals or Therapeutic Targets, If Applicable: reduce A1c below 8    Reassessment Plan & Follow-Up  1. Pharmacist to perform regular reassessments no more than (6) months from the previous assessment.  2. Welcome information and patient satisfaction survey to be sent by retail team with patient's initial fill.  3. Care Coordinator to set up future refill outreaches, coordinate prescription delivery, and escalate clinical questions to pharmacist.      Attestation  I attest that the initiated specialty medication(s) are appropriate for the patient based on my assessment.  If the prescribed therapy is at any point deemed not appropriate based on the current or future assessments, a consultation will be initiated with the patient's specialty care provider to determine the best course of action. The revised plan of therapy will be documented along with any additional patient education provided.     Lucius Vargas, EtienneD, MPH, BCPS    11/18/2021  08:30 CST

## 2021-11-24 ENCOUNTER — HOSPITAL ENCOUNTER (OUTPATIENT)
Dept: CT IMAGING | Facility: HOSPITAL | Age: 66
Discharge: HOME | End: 2021-11-24
Admitting: NURSE PRACTITIONER

## 2021-11-24 DIAGNOSIS — R26.89 LOSS OF BALANCE: ICD-10-CM

## 2021-11-24 PROCEDURE — 70450 CT HEAD/BRAIN W/O DYE: CPT

## 2021-12-02 ENCOUNTER — TELEPHONE (OUTPATIENT)
Dept: FAMILY MEDICINE CLINIC | Facility: CLINIC | Age: 66
End: 2021-12-02

## 2021-12-02 NOTE — TELEPHONE ENCOUNTER
Per MERT Holt, Ms. Garcia has been called with recent Head CT results & recommendations.  Continue current medications and follow-up as planned or sooner if any problems.       ----- Message from MERT Escobar sent at 12/2/2021  6:51 AM CST -----  No acute process on her CT of the head but did show some cerebellar and cerebral atrophy as well as minimal small vessel disease.  Seizure consistent changes with aging.  CT did show an old lacunar infarct.  Can you please verify whether or not she is still taking an aspirin on a daily basis?

## 2022-01-05 ENCOUNTER — SPECIALTY PHARMACY (OUTPATIENT)
Dept: ENDOCRINOLOGY | Facility: CLINIC | Age: 67
End: 2022-01-05

## 2022-01-05 NOTE — PROGRESS NOTES
Specialty Pharmacy Refill Coordination Note     Bonnie is a 66 y.o. female contacted today regarding refills of  Synjardy due for refill specialty medication(s).    Reviewed and verified with patient: Medications and allergies and discussed Repatha as well.     Specialty medication(s) and dose(s) confirmed: yes    Refill Questions      Most Recent Value   Changes to allergies? No   Changes to medications? Yes   [Unable to use repatha because she bruised since she is on Mobic]   New conditions since last clinic visit No   Unplanned office visit, urgent care, ED, or hospital admission in the last 4 weeks  No   How does patient/caregiver feel medication is working? Very good   Financial problems or insurance changes  No   How many doses of your specialty medications were missed in the last 4 weeks? 2   Why were doses missed? Repatha caused extreme bruising   Does this patient require a clinical escalation to a pharmacist? Yes          Delivery Questions      Most Recent Value   Delivery method  at Pharmacy   Number of medications in delivery 2   Medication being filled and delivered Mobic and Synjardy   Is there any medication that is due not being filled? No   Supplies needed? No supplies needed   Copay form of payment Pay at pickup   Questions or concerns for the pharmacist? Yes   Explain any questions or concerns for the pharmacist Can't take the repatha   Are any medications first time fills? No        Patient had some bruising since using the Repatha and thinks it maybe related to the Mobic.  She has stopped using the Repatha and would like Lucius to follow up with her to discuss with Dr Sanchez.            Follow-up: 1 month      Deondre L. Cassia  Specialty Pharmacy Technician

## 2022-01-11 RX ORDER — ORAL SEMAGLUTIDE 7 MG/1
7 TABLET ORAL DAILY
Qty: 30 TABLET | Refills: 11 | Status: ON HOLD | OUTPATIENT
Start: 2022-01-11 | End: 2022-03-30

## 2022-02-08 ENCOUNTER — SPECIALTY PHARMACY (OUTPATIENT)
Dept: ENDOCRINOLOGY | Facility: CLINIC | Age: 67
End: 2022-02-08

## 2022-03-10 DIAGNOSIS — M19.90 ARTHRITIS: ICD-10-CM

## 2022-03-10 RX ORDER — MELOXICAM 7.5 MG/1
7.5 TABLET ORAL DAILY
Qty: 30 TABLET | Refills: 1 | Status: SHIPPED | OUTPATIENT
Start: 2022-03-10 | End: 2022-09-26 | Stop reason: SDUPTHER

## 2022-03-11 ENCOUNTER — SPECIALTY PHARMACY (OUTPATIENT)
Dept: ENDOCRINOLOGY | Facility: CLINIC | Age: 67
End: 2022-03-11

## 2022-03-28 ENCOUNTER — HOSPITAL ENCOUNTER (INPATIENT)
Facility: HOSPITAL | Age: 67
LOS: 2 days | Discharge: HOME | End: 2022-03-30
Attending: FAMILY MEDICINE | Admitting: HOSPITALIST

## 2022-03-28 ENCOUNTER — APPOINTMENT (OUTPATIENT)
Dept: CT IMAGING | Facility: HOSPITAL | Age: 67
End: 2022-03-28

## 2022-03-28 ENCOUNTER — APPOINTMENT (OUTPATIENT)
Dept: GENERAL RADIOLOGY | Facility: HOSPITAL | Age: 67
End: 2022-03-28

## 2022-03-28 DIAGNOSIS — E11.10 DIABETIC KETOACIDOSIS WITHOUT COMA ASSOCIATED WITH TYPE 2 DIABETES MELLITUS: Primary | ICD-10-CM

## 2022-03-28 PROBLEM — R10.9 ABDOMINAL PAIN: Status: ACTIVE | Noted: 2022-03-28

## 2022-03-28 PROBLEM — R11.2 NAUSEA AND VOMITING: Status: ACTIVE | Noted: 2022-03-28

## 2022-03-28 LAB
ACETONE BLD QL: ABNORMAL
ALBUMIN SERPL-MCNC: 4.8 G/DL (ref 3.5–5.2)
ALBUMIN/GLOB SERPL: 1.7 G/DL
ALP SERPL-CCNC: 120 U/L (ref 39–117)
ALT SERPL W P-5'-P-CCNC: 15 U/L (ref 1–33)
ANION GAP SERPL CALCULATED.3IONS-SCNC: 16 MMOL/L (ref 5–15)
ANION GAP SERPL CALCULATED.3IONS-SCNC: 25 MMOL/L (ref 5–15)
ANION GAP SERPL CALCULATED.3IONS-SCNC: 33 MMOL/L (ref 5–15)
ARTERIAL PATENCY WRIST A: ABNORMAL
AST SERPL-CCNC: 19 U/L (ref 1–32)
ATMOSPHERIC PRESS: 754 MMHG
BACTERIA UR QL AUTO: NORMAL /HPF
BASE EXCESS BLDA CALC-SCNC: -21.9 MMOL/L (ref 0–2)
BASOPHILS # BLD AUTO: 0.19 10*3/MM3 (ref 0–0.2)
BASOPHILS NFR BLD AUTO: 1 % (ref 0–1.5)
BDY SITE: ABNORMAL
BILIRUB SERPL-MCNC: 0.5 MG/DL (ref 0–1.2)
BILIRUB UR QL STRIP: NEGATIVE
BUN SERPL-MCNC: 20 MG/DL (ref 8–23)
BUN SERPL-MCNC: 23 MG/DL (ref 8–23)
BUN SERPL-MCNC: 24 MG/DL (ref 8–23)
BUN/CREAT SERPL: 18.2 (ref 7–25)
BUN/CREAT SERPL: 22.8 (ref 7–25)
BUN/CREAT SERPL: 24.4 (ref 7–25)
CALCIUM SPEC-SCNC: 8.8 MG/DL (ref 8.6–10.5)
CALCIUM SPEC-SCNC: 9.2 MG/DL (ref 8.6–10.5)
CALCIUM SPEC-SCNC: 9.9 MG/DL (ref 8.6–10.5)
CHLORIDE SERPL-SCNC: 100 MMOL/L (ref 98–107)
CHLORIDE SERPL-SCNC: 112 MMOL/L (ref 98–107)
CHLORIDE SERPL-SCNC: 94 MMOL/L (ref 98–107)
CLARITY UR: CLEAR
CO2 SERPL-SCNC: 12 MMOL/L (ref 22–29)
CO2 SERPL-SCNC: 5 MMOL/L (ref 22–29)
CO2 SERPL-SCNC: 6 MMOL/L (ref 22–29)
COLOR UR: YELLOW
CREAT SERPL-MCNC: 0.82 MG/DL (ref 0.57–1)
CREAT SERPL-MCNC: 1.01 MG/DL (ref 0.57–1)
CREAT SERPL-MCNC: 1.32 MG/DL (ref 0.57–1)
D-LACTATE SERPL-SCNC: 2.6 MMOL/L (ref 0.5–2)
D-LACTATE SERPL-SCNC: 5.6 MMOL/L (ref 0.5–2)
DEPRECATED RDW RBC AUTO: 49.8 FL (ref 37–54)
EGFRCR SERPLBLD CKD-EPI 2021: 44.6 ML/MIN/1.73
EGFRCR SERPLBLD CKD-EPI 2021: 61.5 ML/MIN/1.73
EGFRCR SERPLBLD CKD-EPI 2021: 79 ML/MIN/1.73
EOSINOPHIL # BLD AUTO: 4.21 10*3/MM3 (ref 0–0.4)
EOSINOPHIL NFR BLD AUTO: 22.3 % (ref 0.3–6.2)
ERYTHROCYTE [DISTWIDTH] IN BLOOD BY AUTOMATED COUNT: 14.4 % (ref 12.3–15.4)
GLOBULIN UR ELPH-MCNC: 2.9 GM/DL
GLUCOSE BLDC GLUCOMTR-MCNC: 145 MG/DL (ref 70–130)
GLUCOSE BLDC GLUCOMTR-MCNC: 191 MG/DL (ref 70–130)
GLUCOSE BLDC GLUCOMTR-MCNC: 214 MG/DL (ref 70–130)
GLUCOSE BLDC GLUCOMTR-MCNC: 299 MG/DL (ref 70–130)
GLUCOSE BLDC GLUCOMTR-MCNC: 399 MG/DL (ref 70–130)
GLUCOSE SERPL-MCNC: 157 MG/DL (ref 65–99)
GLUCOSE SERPL-MCNC: 431 MG/DL (ref 65–99)
GLUCOSE SERPL-MCNC: 600 MG/DL (ref 65–99)
GLUCOSE UR STRIP-MCNC: ABNORMAL MG/DL
HBA1C MFR BLD: 9.2 % (ref 4.8–5.6)
HCO3 BLDA-SCNC: 5.4 MMOL/L (ref 20–26)
HCT VFR BLD AUTO: 48.6 % (ref 34–46.6)
HGB BLD-MCNC: 16.1 G/DL (ref 12–15.9)
HGB UR QL STRIP.AUTO: NEGATIVE
HOLD SPECIMEN: NORMAL
HYALINE CASTS UR QL AUTO: NORMAL /LPF
IMM GRANULOCYTES # BLD AUTO: 0.71 10*3/MM3 (ref 0–0.05)
IMM GRANULOCYTES NFR BLD AUTO: 3.8 % (ref 0–0.5)
KETONES UR QL STRIP: ABNORMAL
LEUKOCYTE ESTERASE UR QL STRIP.AUTO: NEGATIVE
LIPASE SERPL-CCNC: 29 U/L (ref 13–60)
LYMPHOCYTES # BLD AUTO: 1.43 10*3/MM3 (ref 0.7–3.1)
LYMPHOCYTES NFR BLD AUTO: 7.6 % (ref 19.6–45.3)
Lab: ABNORMAL
MAGNESIUM SERPL-MCNC: 2.3 MG/DL (ref 1.6–2.4)
MCH RBC QN AUTO: 31.6 PG (ref 26.6–33)
MCHC RBC AUTO-ENTMCNC: 33.1 G/DL (ref 31.5–35.7)
MCV RBC AUTO: 95.5 FL (ref 79–97)
MODALITY: ABNORMAL
MONOCYTES # BLD AUTO: 1.36 10*3/MM3 (ref 0.1–0.9)
MONOCYTES NFR BLD AUTO: 7.2 % (ref 5–12)
NEUTROPHILS NFR BLD AUTO: 10.98 10*3/MM3 (ref 1.7–7)
NEUTROPHILS NFR BLD AUTO: 58.1 % (ref 42.7–76)
NITRITE UR QL STRIP: NEGATIVE
NRBC BLD AUTO-RTO: 0 /100 WBC (ref 0–0.2)
NT-PROBNP SERPL-MCNC: 97.3 PG/ML (ref 0–900)
OSMOLALITY SERPL: 226 MOSM/KG (ref 280–301)
PCO2 BLDA: 16.6 MM HG (ref 35–45)
PH BLDA: 7.12 PH UNITS (ref 7.35–7.45)
PH UR STRIP.AUTO: <=5 [PH] (ref 5–9)
PHOSPHATE SERPL-MCNC: 1.6 MG/DL (ref 2.5–4.5)
PHOSPHATE SERPL-MCNC: 3.3 MG/DL (ref 2.5–4.5)
PHOSPHATE SERPL-MCNC: 3.7 MG/DL (ref 2.5–4.5)
PLATELET # BLD AUTO: 394 10*3/MM3 (ref 140–450)
PMV BLD AUTO: 10.5 FL (ref 6–12)
PO2 BLDA: 122 MM HG (ref 83–108)
POTASSIUM SERPL-SCNC: 4.3 MMOL/L (ref 3.5–5.2)
POTASSIUM SERPL-SCNC: 4.5 MMOL/L (ref 3.5–5.2)
POTASSIUM SERPL-SCNC: 5 MMOL/L (ref 3.5–5.2)
PROT SERPL-MCNC: 7.7 G/DL (ref 6–8.5)
PROT UR QL STRIP: ABNORMAL
RBC # BLD AUTO: 5.09 10*6/MM3 (ref 3.77–5.28)
RBC # UR STRIP: NORMAL /HPF
RBC MORPH BLD: NORMAL
REF LAB TEST METHOD: NORMAL
SAO2 % BLDCOA: 96.8 % (ref 94–99)
SARS-COV-2 RNA RESP QL NAA+PROBE: NOT DETECTED
SMALL PLATELETS BLD QL SMEAR: ADEQUATE
SODIUM SERPL-SCNC: 130 MMOL/L (ref 136–145)
SODIUM SERPL-SCNC: 133 MMOL/L (ref 136–145)
SODIUM SERPL-SCNC: 140 MMOL/L (ref 136–145)
SP GR UR STRIP: 1.03 (ref 1–1.03)
SQUAMOUS #/AREA URNS HPF: NORMAL /HPF
TROPONIN T SERPL-MCNC: <0.01 NG/ML (ref 0–0.03)
TROPONIN T SERPL-MCNC: <0.01 NG/ML (ref 0–0.03)
UROBILINOGEN UR QL STRIP: ABNORMAL
VENTILATOR MODE: ABNORMAL
WBC # UR STRIP: NORMAL /HPF
WBC MORPH BLD: NORMAL
WBC NRBC COR # BLD: 18.88 10*3/MM3 (ref 3.4–10.8)
WHOLE BLOOD HOLD SPECIMEN: NORMAL

## 2022-03-28 PROCEDURE — 82803 BLOOD GASES ANY COMBINATION: CPT

## 2022-03-28 PROCEDURE — 36600 WITHDRAWAL OF ARTERIAL BLOOD: CPT

## 2022-03-28 PROCEDURE — 25010000002 PIPERACILLIN SOD-TAZOBACTAM PER 1 G: Performed by: HOSPITALIST

## 2022-03-28 PROCEDURE — 25010000002 VANCOMYCIN: Performed by: FAMILY MEDICINE

## 2022-03-28 PROCEDURE — 71045 X-RAY EXAM CHEST 1 VIEW: CPT

## 2022-03-28 PROCEDURE — 82962 GLUCOSE BLOOD TEST: CPT

## 2022-03-28 PROCEDURE — 82009 KETONE BODYS QUAL: CPT | Performed by: STUDENT IN AN ORGANIZED HEALTH CARE EDUCATION/TRAINING PROGRAM

## 2022-03-28 PROCEDURE — 74176 CT ABD & PELVIS W/O CONTRAST: CPT

## 2022-03-28 PROCEDURE — 93005 ELECTROCARDIOGRAM TRACING: CPT

## 2022-03-28 PROCEDURE — 83735 ASSAY OF MAGNESIUM: CPT | Performed by: STUDENT IN AN ORGANIZED HEALTH CARE EDUCATION/TRAINING PROGRAM

## 2022-03-28 PROCEDURE — 93010 ELECTROCARDIOGRAM REPORT: CPT | Performed by: INTERNAL MEDICINE

## 2022-03-28 PROCEDURE — 99285 EMERGENCY DEPT VISIT HI MDM: CPT

## 2022-03-28 PROCEDURE — 25010000002 ONDANSETRON PER 1 MG: Performed by: STUDENT IN AN ORGANIZED HEALTH CARE EDUCATION/TRAINING PROGRAM

## 2022-03-28 PROCEDURE — 80053 COMPREHEN METABOLIC PANEL: CPT | Performed by: STUDENT IN AN ORGANIZED HEALTH CARE EDUCATION/TRAINING PROGRAM

## 2022-03-28 PROCEDURE — 83605 ASSAY OF LACTIC ACID: CPT | Performed by: STUDENT IN AN ORGANIZED HEALTH CARE EDUCATION/TRAINING PROGRAM

## 2022-03-28 PROCEDURE — 85025 COMPLETE CBC W/AUTO DIFF WBC: CPT | Performed by: STUDENT IN AN ORGANIZED HEALTH CARE EDUCATION/TRAINING PROGRAM

## 2022-03-28 PROCEDURE — 25010000002 VANCOMYCIN 10 G RECONSTITUTED SOLUTION: Performed by: FAMILY MEDICINE

## 2022-03-28 PROCEDURE — 84100 ASSAY OF PHOSPHORUS: CPT | Performed by: FAMILY MEDICINE

## 2022-03-28 PROCEDURE — 87040 BLOOD CULTURE FOR BACTERIA: CPT | Performed by: STUDENT IN AN ORGANIZED HEALTH CARE EDUCATION/TRAINING PROGRAM

## 2022-03-28 PROCEDURE — 36415 COLL VENOUS BLD VENIPUNCTURE: CPT | Performed by: STUDENT IN AN ORGANIZED HEALTH CARE EDUCATION/TRAINING PROGRAM

## 2022-03-28 PROCEDURE — 83690 ASSAY OF LIPASE: CPT | Performed by: STUDENT IN AN ORGANIZED HEALTH CARE EDUCATION/TRAINING PROGRAM

## 2022-03-28 PROCEDURE — 25010000002 SODIUM CHLORIDE 0.9 % WITH KCL 20 MEQ 20-0.9 MEQ/L-% SOLUTION: Performed by: FAMILY MEDICINE

## 2022-03-28 PROCEDURE — 87635 SARS-COV-2 COVID-19 AMP PRB: CPT | Performed by: STUDENT IN AN ORGANIZED HEALTH CARE EDUCATION/TRAINING PROGRAM

## 2022-03-28 PROCEDURE — 83036 HEMOGLOBIN GLYCOSYLATED A1C: CPT | Performed by: FAMILY MEDICINE

## 2022-03-28 PROCEDURE — 83930 ASSAY OF BLOOD OSMOLALITY: CPT | Performed by: FAMILY MEDICINE

## 2022-03-28 PROCEDURE — 51702 INSERT TEMP BLADDER CATH: CPT

## 2022-03-28 PROCEDURE — 84484 ASSAY OF TROPONIN QUANT: CPT | Performed by: STUDENT IN AN ORGANIZED HEALTH CARE EDUCATION/TRAINING PROGRAM

## 2022-03-28 PROCEDURE — 25010000002 PIPERACILLIN SOD-TAZOBACTAM PER 1 G: Performed by: FAMILY MEDICINE

## 2022-03-28 PROCEDURE — 81001 URINALYSIS AUTO W/SCOPE: CPT | Performed by: STUDENT IN AN ORGANIZED HEALTH CARE EDUCATION/TRAINING PROGRAM

## 2022-03-28 PROCEDURE — 25010000002 ENOXAPARIN PER 10 MG: Performed by: HOSPITALIST

## 2022-03-28 PROCEDURE — 25010000002 LORAZEPAM PER 2 MG: Performed by: STUDENT IN AN ORGANIZED HEALTH CARE EDUCATION/TRAINING PROGRAM

## 2022-03-28 PROCEDURE — 83880 ASSAY OF NATRIURETIC PEPTIDE: CPT | Performed by: STUDENT IN AN ORGANIZED HEALTH CARE EDUCATION/TRAINING PROGRAM

## 2022-03-28 PROCEDURE — 85007 BL SMEAR W/DIFF WBC COUNT: CPT | Performed by: STUDENT IN AN ORGANIZED HEALTH CARE EDUCATION/TRAINING PROGRAM

## 2022-03-28 PROCEDURE — 0 INSULIN REGULAR HUMAN PER 5 UNITS: Performed by: FAMILY MEDICINE

## 2022-03-28 RX ORDER — DEXTROSE MONOHYDRATE 25 G/50ML
12.5 INJECTION, SOLUTION INTRAVENOUS AS NEEDED
Status: DISCONTINUED | OUTPATIENT
Start: 2022-03-28 | End: 2022-03-29

## 2022-03-28 RX ORDER — CARVEDILOL 6.25 MG/1
6.25 TABLET ORAL EVERY 12 HOURS SCHEDULED
Status: DISCONTINUED | OUTPATIENT
Start: 2022-03-28 | End: 2022-03-30 | Stop reason: HOSPADM

## 2022-03-28 RX ORDER — SODIUM CHLORIDE 450 MG/100ML
250 INJECTION, SOLUTION INTRAVENOUS CONTINUOUS PRN
Status: DISCONTINUED | OUTPATIENT
Start: 2022-03-28 | End: 2022-03-30

## 2022-03-28 RX ORDER — PRASUGREL 10 MG/1
10 TABLET, FILM COATED ORAL DAILY
Status: DISCONTINUED | OUTPATIENT
Start: 2022-03-29 | End: 2022-03-30 | Stop reason: HOSPADM

## 2022-03-28 RX ORDER — DEXTROSE, SODIUM CHLORIDE, AND POTASSIUM CHLORIDE 5; .45; .15 G/100ML; G/100ML; G/100ML
150 INJECTION INTRAVENOUS CONTINUOUS PRN
Status: DISCONTINUED | OUTPATIENT
Start: 2022-03-28 | End: 2022-03-30

## 2022-03-28 RX ORDER — SODIUM CHLORIDE 0.9 % (FLUSH) 0.9 %
10 SYRINGE (ML) INJECTION EVERY 12 HOURS SCHEDULED
Status: DISCONTINUED | OUTPATIENT
Start: 2022-03-28 | End: 2022-03-30 | Stop reason: HOSPADM

## 2022-03-28 RX ORDER — SODIUM CHLORIDE AND POTASSIUM CHLORIDE 300; 900 MG/100ML; MG/100ML
250 INJECTION, SOLUTION INTRAVENOUS CONTINUOUS PRN
Status: DISCONTINUED | OUTPATIENT
Start: 2022-03-28 | End: 2022-03-30

## 2022-03-28 RX ORDER — DEXTROSE, SODIUM CHLORIDE, AND POTASSIUM CHLORIDE 5; .9; .15 G/100ML; G/100ML; G/100ML
150 INJECTION INTRAVENOUS CONTINUOUS PRN
Status: DISCONTINUED | OUTPATIENT
Start: 2022-03-28 | End: 2022-03-30

## 2022-03-28 RX ORDER — SODIUM CHLORIDE 9 MG/ML
10 INJECTION, SOLUTION INTRAVENOUS CONTINUOUS PRN
Status: DISCONTINUED | OUTPATIENT
Start: 2022-03-28 | End: 2022-03-30

## 2022-03-28 RX ORDER — POTASSIUM CHLORIDE, DEXTROSE MONOHYDRATE AND SODIUM CHLORIDE 300; 5; 900 MG/100ML; G/100ML; MG/100ML
150 INJECTION, SOLUTION INTRAVENOUS CONTINUOUS PRN
Status: DISCONTINUED | OUTPATIENT
Start: 2022-03-28 | End: 2022-03-30

## 2022-03-28 RX ORDER — DEXTROSE, SODIUM CHLORIDE, AND POTASSIUM CHLORIDE 5; .45; .3 G/100ML; G/100ML; G/100ML
150 INJECTION INTRAVENOUS CONTINUOUS PRN
Status: DISCONTINUED | OUTPATIENT
Start: 2022-03-28 | End: 2022-03-30

## 2022-03-28 RX ORDER — ONDANSETRON 4 MG/1
4 TABLET, FILM COATED ORAL EVERY 6 HOURS PRN
Status: DISCONTINUED | OUTPATIENT
Start: 2022-03-28 | End: 2022-03-30 | Stop reason: HOSPADM

## 2022-03-28 RX ORDER — LORAZEPAM 2 MG/ML
1 INJECTION INTRAMUSCULAR ONCE
Status: COMPLETED | OUTPATIENT
Start: 2022-03-28 | End: 2022-03-28

## 2022-03-28 RX ORDER — SODIUM CHLORIDE AND POTASSIUM CHLORIDE 150; 450 MG/100ML; MG/100ML
250 INJECTION, SOLUTION INTRAVENOUS CONTINUOUS PRN
Status: DISCONTINUED | OUTPATIENT
Start: 2022-03-28 | End: 2022-03-30

## 2022-03-28 RX ORDER — SODIUM CHLORIDE 0.9 % (FLUSH) 0.9 %
10 SYRINGE (ML) INJECTION AS NEEDED
Status: DISCONTINUED | OUTPATIENT
Start: 2022-03-28 | End: 2022-03-30 | Stop reason: HOSPADM

## 2022-03-28 RX ORDER — ONDANSETRON 2 MG/ML
4 INJECTION INTRAMUSCULAR; INTRAVENOUS ONCE
Status: COMPLETED | OUTPATIENT
Start: 2022-03-28 | End: 2022-03-28

## 2022-03-28 RX ORDER — DEXTROSE AND SODIUM CHLORIDE 5; .45 G/100ML; G/100ML
150 INJECTION, SOLUTION INTRAVENOUS CONTINUOUS PRN
Status: DISCONTINUED | OUTPATIENT
Start: 2022-03-28 | End: 2022-03-30

## 2022-03-28 RX ORDER — ACETAMINOPHEN 325 MG/1
650 TABLET ORAL EVERY 4 HOURS PRN
Status: DISCONTINUED | OUTPATIENT
Start: 2022-03-28 | End: 2022-03-30 | Stop reason: HOSPADM

## 2022-03-28 RX ORDER — SODIUM CHLORIDE AND POTASSIUM CHLORIDE 150; 900 MG/100ML; MG/100ML
250 INJECTION, SOLUTION INTRAVENOUS CONTINUOUS PRN
Status: DISCONTINUED | OUTPATIENT
Start: 2022-03-28 | End: 2022-03-30

## 2022-03-28 RX ORDER — RANOLAZINE 500 MG/1
1000 TABLET, EXTENDED RELEASE ORAL EVERY 12 HOURS SCHEDULED
Status: DISCONTINUED | OUTPATIENT
Start: 2022-03-28 | End: 2022-03-30 | Stop reason: HOSPADM

## 2022-03-28 RX ORDER — SODIUM CHLORIDE 9 MG/ML
250 INJECTION, SOLUTION INTRAVENOUS CONTINUOUS PRN
Status: DISCONTINUED | OUTPATIENT
Start: 2022-03-28 | End: 2022-03-30

## 2022-03-28 RX ORDER — SODIUM CHLORIDE 0.9 % (FLUSH) 0.9 %
10 SYRINGE (ML) INJECTION ONCE AS NEEDED
Status: DISCONTINUED | OUTPATIENT
Start: 2022-03-28 | End: 2022-03-30 | Stop reason: HOSPADM

## 2022-03-28 RX ORDER — DEXTROSE AND SODIUM CHLORIDE 5; .9 G/100ML; G/100ML
150 INJECTION, SOLUTION INTRAVENOUS CONTINUOUS PRN
Status: DISCONTINUED | OUTPATIENT
Start: 2022-03-28 | End: 2022-03-30

## 2022-03-28 RX ORDER — ONDANSETRON 2 MG/ML
4 INJECTION INTRAMUSCULAR; INTRAVENOUS EVERY 6 HOURS PRN
Status: DISCONTINUED | OUTPATIENT
Start: 2022-03-28 | End: 2022-03-30 | Stop reason: HOSPADM

## 2022-03-28 RX ORDER — NITROGLYCERIN 0.1MG/HR
1 PATCH, TRANSDERMAL 24 HOURS TRANSDERMAL DAILY
Status: DISCONTINUED | OUTPATIENT
Start: 2022-03-28 | End: 2022-03-30 | Stop reason: HOSPADM

## 2022-03-28 RX ORDER — ASPIRIN 81 MG/1
324 TABLET ORAL NIGHTLY
Status: DISCONTINUED | OUTPATIENT
Start: 2022-03-28 | End: 2022-03-30 | Stop reason: HOSPADM

## 2022-03-28 RX ADMIN — PIPERACILLIN AND TAZOBACTAM 3.38 G: 3; .375 INJECTION, POWDER, FOR SOLUTION INTRAVENOUS at 21:11

## 2022-03-28 RX ADMIN — SODIUM CHLORIDE 1000 ML: 9 INJECTION, SOLUTION INTRAVENOUS at 13:38

## 2022-03-28 RX ADMIN — POTASSIUM CHLORIDE, DEXTROSE MONOHYDRATE AND SODIUM CHLORIDE 150 ML/HR: 150; 5; 450 INJECTION, SOLUTION INTRAVENOUS at 21:30

## 2022-03-28 RX ADMIN — ONDANSETRON 4 MG: 2 INJECTION INTRAMUSCULAR; INTRAVENOUS at 13:23

## 2022-03-28 RX ADMIN — LORAZEPAM 1 MG: 2 INJECTION INTRAMUSCULAR; INTRAVENOUS at 14:23

## 2022-03-28 RX ADMIN — CARVEDILOL 6.25 MG: 6.25 TABLET, FILM COATED ORAL at 20:15

## 2022-03-28 RX ADMIN — POTASSIUM CHLORIDE AND SODIUM CHLORIDE 250 ML/HR: 900; 150 INJECTION, SOLUTION INTRAVENOUS at 17:17

## 2022-03-28 RX ADMIN — VANCOMYCIN HYDROCHLORIDE 1500 MG: 10 INJECTION, POWDER, LYOPHILIZED, FOR SOLUTION INTRAVENOUS at 16:11

## 2022-03-28 RX ADMIN — SODIUM CHLORIDE 7 UNITS/HR: 9 INJECTION, SOLUTION INTRAVENOUS at 14:37

## 2022-03-28 RX ADMIN — ENOXAPARIN SODIUM 40 MG: 40 INJECTION SUBCUTANEOUS at 20:15

## 2022-03-28 RX ADMIN — PIPERACILLIN AND TAZOBACTAM 3.38 G: 3; .375 INJECTION, POWDER, FOR SOLUTION INTRAVENOUS at 14:24

## 2022-03-28 RX ADMIN — RANOLAZINE 1000 MG: 500 TABLET, FILM COATED, EXTENDED RELEASE ORAL at 20:16

## 2022-03-28 RX ADMIN — ASPIRIN 324 MG: 81 TABLET, FILM COATED ORAL at 20:15

## 2022-03-28 RX ADMIN — SODIUM CHLORIDE, POTASSIUM CHLORIDE, SODIUM LACTATE AND CALCIUM CHLORIDE 1000 ML: 600; 310; 30; 20 INJECTION, SOLUTION INTRAVENOUS at 13:25

## 2022-03-28 RX ADMIN — Medication 10 ML: at 14:56

## 2022-03-28 RX ADMIN — SODIUM CHLORIDE 1000 ML: 9 INJECTION, SOLUTION INTRAVENOUS at 13:37

## 2022-03-29 LAB
ANION GAP SERPL CALCULATED.3IONS-SCNC: 11 MMOL/L (ref 5–15)
ANION GAP SERPL CALCULATED.3IONS-SCNC: 14 MMOL/L (ref 5–15)
ANION GAP SERPL CALCULATED.3IONS-SCNC: 8 MMOL/L (ref 5–15)
BASOPHILS # BLD AUTO: 0.04 10*3/MM3 (ref 0–0.2)
BASOPHILS NFR BLD AUTO: 0.6 % (ref 0–1.5)
BUN SERPL-MCNC: 11 MG/DL (ref 8–23)
BUN SERPL-MCNC: 12 MG/DL (ref 8–23)
BUN SERPL-MCNC: 12 MG/DL (ref 8–23)
BUN SERPL-MCNC: 13 MG/DL (ref 8–23)
BUN SERPL-MCNC: 15 MG/DL (ref 8–23)
BUN SERPL-MCNC: 17 MG/DL (ref 8–23)
BUN/CREAT SERPL: 15.2 (ref 7–25)
BUN/CREAT SERPL: 15.7 (ref 7–25)
BUN/CREAT SERPL: 18.2 (ref 7–25)
BUN/CREAT SERPL: 20.3 (ref 7–25)
BUN/CREAT SERPL: 20.6 (ref 7–25)
BUN/CREAT SERPL: 22.1 (ref 7–25)
CALCIUM SPEC-SCNC: 7.9 MG/DL (ref 8.6–10.5)
CALCIUM SPEC-SCNC: 8.1 MG/DL (ref 8.6–10.5)
CALCIUM SPEC-SCNC: 8.1 MG/DL (ref 8.6–10.5)
CALCIUM SPEC-SCNC: 8.3 MG/DL (ref 8.6–10.5)
CALCIUM SPEC-SCNC: 8.5 MG/DL (ref 8.6–10.5)
CALCIUM SPEC-SCNC: 8.8 MG/DL (ref 8.6–10.5)
CHLORIDE SERPL-SCNC: 110 MMOL/L (ref 98–107)
CHLORIDE SERPL-SCNC: 112 MMOL/L (ref 98–107)
CHLORIDE SERPL-SCNC: 112 MMOL/L (ref 98–107)
CHLORIDE SERPL-SCNC: 113 MMOL/L (ref 98–107)
CO2 SERPL-SCNC: 15 MMOL/L (ref 22–29)
CO2 SERPL-SCNC: 16 MMOL/L (ref 22–29)
CO2 SERPL-SCNC: 16 MMOL/L (ref 22–29)
CO2 SERPL-SCNC: 17 MMOL/L (ref 22–29)
CO2 SERPL-SCNC: 18 MMOL/L (ref 22–29)
CO2 SERPL-SCNC: 20 MMOL/L (ref 22–29)
CREAT SERPL-MCNC: 0.63 MG/DL (ref 0.57–1)
CREAT SERPL-MCNC: 0.66 MG/DL (ref 0.57–1)
CREAT SERPL-MCNC: 0.7 MG/DL (ref 0.57–1)
CREAT SERPL-MCNC: 0.74 MG/DL (ref 0.57–1)
CREAT SERPL-MCNC: 0.77 MG/DL (ref 0.57–1)
CREAT SERPL-MCNC: 0.79 MG/DL (ref 0.57–1)
DEPRECATED RDW RBC AUTO: 48.7 FL (ref 37–54)
EGFRCR SERPLBLD CKD-EPI 2021: 82.6 ML/MIN/1.73
EGFRCR SERPLBLD CKD-EPI 2021: 85.2 ML/MIN/1.73
EGFRCR SERPLBLD CKD-EPI 2021: 89.4 ML/MIN/1.73
EGFRCR SERPLBLD CKD-EPI 2021: 95.5 ML/MIN/1.73
EGFRCR SERPLBLD CKD-EPI 2021: 96.9 ML/MIN/1.73
EGFRCR SERPLBLD CKD-EPI 2021: 98 ML/MIN/1.73
EOSINOPHIL # BLD AUTO: 0.14 10*3/MM3 (ref 0–0.4)
EOSINOPHIL NFR BLD AUTO: 2.2 % (ref 0.3–6.2)
ERYTHROCYTE [DISTWIDTH] IN BLOOD BY AUTOMATED COUNT: 14.6 % (ref 12.3–15.4)
GLUCOSE BLDC GLUCOMTR-MCNC: 111 MG/DL (ref 70–130)
GLUCOSE BLDC GLUCOMTR-MCNC: 116 MG/DL (ref 70–130)
GLUCOSE BLDC GLUCOMTR-MCNC: 132 MG/DL (ref 70–130)
GLUCOSE BLDC GLUCOMTR-MCNC: 138 MG/DL (ref 70–130)
GLUCOSE BLDC GLUCOMTR-MCNC: 140 MG/DL (ref 70–130)
GLUCOSE BLDC GLUCOMTR-MCNC: 151 MG/DL (ref 70–130)
GLUCOSE BLDC GLUCOMTR-MCNC: 154 MG/DL (ref 70–130)
GLUCOSE BLDC GLUCOMTR-MCNC: 157 MG/DL (ref 70–130)
GLUCOSE BLDC GLUCOMTR-MCNC: 168 MG/DL (ref 70–130)
GLUCOSE BLDC GLUCOMTR-MCNC: 177 MG/DL (ref 70–130)
GLUCOSE BLDC GLUCOMTR-MCNC: 192 MG/DL (ref 70–130)
GLUCOSE BLDC GLUCOMTR-MCNC: 197 MG/DL (ref 70–130)
GLUCOSE BLDC GLUCOMTR-MCNC: 202 MG/DL (ref 70–130)
GLUCOSE BLDC GLUCOMTR-MCNC: 218 MG/DL (ref 70–130)
GLUCOSE BLDC GLUCOMTR-MCNC: 226 MG/DL (ref 70–130)
GLUCOSE BLDC GLUCOMTR-MCNC: 237 MG/DL (ref 70–130)
GLUCOSE BLDC GLUCOMTR-MCNC: 558 MG/DL (ref 70–130)
GLUCOSE SERPL-MCNC: 159 MG/DL (ref 65–99)
GLUCOSE SERPL-MCNC: 168 MG/DL (ref 65–99)
GLUCOSE SERPL-MCNC: 182 MG/DL (ref 65–99)
GLUCOSE SERPL-MCNC: 189 MG/DL (ref 65–99)
GLUCOSE SERPL-MCNC: 212 MG/DL (ref 65–99)
GLUCOSE SERPL-MCNC: 218 MG/DL (ref 65–99)
HCT VFR BLD AUTO: 33.7 % (ref 34–46.6)
HGB BLD-MCNC: 11.4 G/DL (ref 12–15.9)
IMM GRANULOCYTES # BLD AUTO: 0.09 10*3/MM3 (ref 0–0.05)
IMM GRANULOCYTES NFR BLD AUTO: 1.4 % (ref 0–0.5)
LYMPHOCYTES # BLD AUTO: 1.49 10*3/MM3 (ref 0.7–3.1)
LYMPHOCYTES NFR BLD AUTO: 23.7 % (ref 19.6–45.3)
MCH RBC QN AUTO: 31.2 PG (ref 26.6–33)
MCHC RBC AUTO-ENTMCNC: 33.8 G/DL (ref 31.5–35.7)
MCV RBC AUTO: 92.3 FL (ref 79–97)
MONOCYTES # BLD AUTO: 0.51 10*3/MM3 (ref 0.1–0.9)
MONOCYTES NFR BLD AUTO: 8.1 % (ref 5–12)
NEUTROPHILS NFR BLD AUTO: 4.02 10*3/MM3 (ref 1.7–7)
NEUTROPHILS NFR BLD AUTO: 64 % (ref 42.7–76)
NRBC BLD AUTO-RTO: 0 /100 WBC (ref 0–0.2)
PHOSPHATE SERPL-MCNC: 1.7 MG/DL (ref 2.5–4.5)
PHOSPHATE SERPL-MCNC: 1.7 MG/DL (ref 2.5–4.5)
PHOSPHATE SERPL-MCNC: 2 MG/DL (ref 2.5–4.5)
PHOSPHATE SERPL-MCNC: 2.1 MG/DL (ref 2.5–4.5)
PHOSPHATE SERPL-MCNC: 2.3 MG/DL (ref 2.5–4.5)
PHOSPHATE SERPL-MCNC: 2.5 MG/DL (ref 2.5–4.5)
PLATELET # BLD AUTO: 212 10*3/MM3 (ref 140–450)
PMV BLD AUTO: 10 FL (ref 6–12)
POTASSIUM SERPL-SCNC: 3.7 MMOL/L (ref 3.5–5.2)
POTASSIUM SERPL-SCNC: 3.7 MMOL/L (ref 3.5–5.2)
POTASSIUM SERPL-SCNC: 3.8 MMOL/L (ref 3.5–5.2)
POTASSIUM SERPL-SCNC: 3.9 MMOL/L (ref 3.5–5.2)
POTASSIUM SERPL-SCNC: 3.9 MMOL/L (ref 3.5–5.2)
POTASSIUM SERPL-SCNC: 4.1 MMOL/L (ref 3.5–5.2)
RBC # BLD AUTO: 3.65 10*6/MM3 (ref 3.77–5.28)
SODIUM SERPL-SCNC: 137 MMOL/L (ref 136–145)
SODIUM SERPL-SCNC: 138 MMOL/L (ref 136–145)
SODIUM SERPL-SCNC: 141 MMOL/L (ref 136–145)
SODIUM SERPL-SCNC: 142 MMOL/L (ref 136–145)
TROPONIN T SERPL-MCNC: <0.01 NG/ML (ref 0–0.03)
WBC NRBC COR # BLD: 6.29 10*3/MM3 (ref 3.4–10.8)

## 2022-03-29 PROCEDURE — 82962 GLUCOSE BLOOD TEST: CPT

## 2022-03-29 PROCEDURE — 84100 ASSAY OF PHOSPHORUS: CPT | Performed by: FAMILY MEDICINE

## 2022-03-29 PROCEDURE — 63710000001 INSULIN DETEMIR PER 5 UNITS: Performed by: HOSPITALIST

## 2022-03-29 PROCEDURE — 0 POTASSIUM CHLORIDE PER 2 MEQ: Performed by: FAMILY MEDICINE

## 2022-03-29 PROCEDURE — 80048 BASIC METABOLIC PNL TOTAL CA: CPT | Performed by: FAMILY MEDICINE

## 2022-03-29 PROCEDURE — 63710000001 INSULIN ASPART PER 5 UNITS: Performed by: HOSPITALIST

## 2022-03-29 PROCEDURE — 25010000002 PIPERACILLIN SOD-TAZOBACTAM PER 1 G: Performed by: HOSPITALIST

## 2022-03-29 PROCEDURE — 36415 COLL VENOUS BLD VENIPUNCTURE: CPT | Performed by: FAMILY MEDICINE

## 2022-03-29 PROCEDURE — 25010000002 ENOXAPARIN PER 10 MG: Performed by: HOSPITALIST

## 2022-03-29 PROCEDURE — 85025 COMPLETE CBC W/AUTO DIFF WBC: CPT | Performed by: HOSPITALIST

## 2022-03-29 PROCEDURE — 0 INSULIN REGULAR HUMAN PER 5 UNITS: Performed by: FAMILY MEDICINE

## 2022-03-29 PROCEDURE — 84484 ASSAY OF TROPONIN QUANT: CPT | Performed by: HOSPITALIST

## 2022-03-29 RX ORDER — DEXTROSE MONOHYDRATE 25 G/50ML
25 INJECTION, SOLUTION INTRAVENOUS
Status: DISCONTINUED | OUTPATIENT
Start: 2022-03-29 | End: 2022-03-30 | Stop reason: HOSPADM

## 2022-03-29 RX ORDER — NICOTINE POLACRILEX 4 MG
15 LOZENGE BUCCAL
Status: DISCONTINUED | OUTPATIENT
Start: 2022-03-29 | End: 2022-03-30 | Stop reason: HOSPADM

## 2022-03-29 RX ORDER — LANOLIN ALCOHOL/MO/W.PET/CERES
6 CREAM (GRAM) TOPICAL NIGHTLY PRN
Status: DISCONTINUED | OUTPATIENT
Start: 2022-03-29 | End: 2022-03-30 | Stop reason: HOSPADM

## 2022-03-29 RX ADMIN — ENOXAPARIN SODIUM 40 MG: 40 INJECTION SUBCUTANEOUS at 20:00

## 2022-03-29 RX ADMIN — INSULIN ASPART 6 UNITS: 100 INJECTION, SOLUTION INTRAVENOUS; SUBCUTANEOUS at 17:54

## 2022-03-29 RX ADMIN — INSULIN ASPART 2 UNITS: 100 INJECTION, SOLUTION INTRAVENOUS; SUBCUTANEOUS at 10:33

## 2022-03-29 RX ADMIN — INSULIN DETEMIR 20 UNITS: 100 INJECTION, SOLUTION SUBCUTANEOUS at 20:25

## 2022-03-29 RX ADMIN — CARVEDILOL 6.25 MG: 6.25 TABLET, FILM COATED ORAL at 08:11

## 2022-03-29 RX ADMIN — NITROGLYCERIN 1 PATCH: 0.1 PATCH TRANSDERMAL at 17:56

## 2022-03-29 RX ADMIN — POTASSIUM CHLORIDE AND SODIUM CHLORIDE 250 ML/HR: 450; 150 INJECTION, SOLUTION INTRAVENOUS at 05:47

## 2022-03-29 RX ADMIN — INSULIN DETEMIR 20 UNITS: 100 INJECTION, SOLUTION SUBCUTANEOUS at 10:04

## 2022-03-29 RX ADMIN — RANOLAZINE 1000 MG: 500 TABLET, FILM COATED, EXTENDED RELEASE ORAL at 20:00

## 2022-03-29 RX ADMIN — CARVEDILOL 6.25 MG: 6.25 TABLET, FILM COATED ORAL at 20:01

## 2022-03-29 RX ADMIN — ASPIRIN 324 MG: 81 TABLET, FILM COATED ORAL at 20:00

## 2022-03-29 RX ADMIN — RANOLAZINE 1000 MG: 500 TABLET, FILM COATED, EXTENDED RELEASE ORAL at 08:07

## 2022-03-29 RX ADMIN — PIPERACILLIN AND TAZOBACTAM 3.38 G: 3; .375 INJECTION, POWDER, FOR SOLUTION INTRAVENOUS at 04:08

## 2022-03-29 RX ADMIN — SODIUM CHLORIDE 3 UNITS/HR: 9 INJECTION, SOLUTION INTRAVENOUS at 07:15

## 2022-03-29 RX ADMIN — MELATONIN 6 MG: 3 TAB ORAL at 00:51

## 2022-03-29 RX ADMIN — POTASSIUM CHLORIDE, DEXTROSE MONOHYDRATE AND SODIUM CHLORIDE 150 ML/HR: 150; 5; 450 INJECTION, SOLUTION INTRAVENOUS at 03:35

## 2022-03-29 RX ADMIN — POTASSIUM PHOSPHATE, MONOBASIC AND POTASSIUM PHOSPHATE, DIBASIC 15 MMOL: 224; 236 INJECTION, SOLUTION, CONCENTRATE INTRAVENOUS at 08:58

## 2022-03-29 RX ADMIN — PIPERACILLIN AND TAZOBACTAM 3.38 G: 3; .375 INJECTION, POWDER, FOR SOLUTION INTRAVENOUS at 20:01

## 2022-03-29 RX ADMIN — POTASSIUM CHLORIDE, DEXTROSE MONOHYDRATE AND SODIUM CHLORIDE 150 ML/HR: 150; 5; 450 INJECTION, SOLUTION INTRAVENOUS at 08:30

## 2022-03-29 RX ADMIN — PRASUGREL 10 MG: 10 TABLET, FILM COATED ORAL at 08:07

## 2022-03-29 RX ADMIN — PIPERACILLIN AND TAZOBACTAM 3.38 G: 3; .375 INJECTION, POWDER, FOR SOLUTION INTRAVENOUS at 13:25

## 2022-03-29 RX ADMIN — INSULIN ASPART 6 UNITS: 100 INJECTION, SOLUTION INTRAVENOUS; SUBCUTANEOUS at 12:46

## 2022-03-29 RX ADMIN — POTASSIUM PHOSPHATE, MONOBASIC AND POTASSIUM PHOSPHATE, DIBASIC 30 MMOL: 224; 236 INJECTION, SOLUTION, CONCENTRATE INTRAVENOUS at 18:28

## 2022-03-29 RX ADMIN — Medication 10 ML: at 20:07

## 2022-03-30 ENCOUNTER — READMISSION MANAGEMENT (OUTPATIENT)
Dept: CALL CENTER | Facility: HOSPITAL | Age: 67
End: 2022-03-30

## 2022-03-30 ENCOUNTER — SPECIALTY PHARMACY (OUTPATIENT)
Dept: ENDOCRINOLOGY | Facility: CLINIC | Age: 67
End: 2022-03-30

## 2022-03-30 VITALS
HEIGHT: 63 IN | DIASTOLIC BLOOD PRESSURE: 75 MMHG | BODY MASS INDEX: 27.62 KG/M2 | OXYGEN SATURATION: 99 % | SYSTOLIC BLOOD PRESSURE: 139 MMHG | WEIGHT: 155.9 LBS | HEART RATE: 80 BPM | RESPIRATION RATE: 18 BRPM | TEMPERATURE: 97.6 F

## 2022-03-30 LAB
ANION GAP SERPL CALCULATED.3IONS-SCNC: 9 MMOL/L (ref 5–15)
ANION GAP SERPL CALCULATED.3IONS-SCNC: 9 MMOL/L (ref 5–15)
BACTERIA UR QL AUTO: ABNORMAL /HPF
BASOPHILS # BLD AUTO: 0.04 10*3/MM3 (ref 0–0.2)
BASOPHILS NFR BLD AUTO: 1 % (ref 0–1.5)
BILIRUB UR QL STRIP: NEGATIVE
BUN SERPL-MCNC: 10 MG/DL (ref 8–23)
BUN SERPL-MCNC: 11 MG/DL (ref 8–23)
BUN/CREAT SERPL: 19 (ref 7–25)
BUN/CREAT SERPL: 20.8 (ref 7–25)
CALCIUM SPEC-SCNC: 8 MG/DL (ref 8.6–10.5)
CALCIUM SPEC-SCNC: 8.3 MG/DL (ref 8.6–10.5)
CHLORIDE SERPL-SCNC: 109 MMOL/L (ref 98–107)
CHLORIDE SERPL-SCNC: 112 MMOL/L (ref 98–107)
CLARITY UR: ABNORMAL
CO2 SERPL-SCNC: 18 MMOL/L (ref 22–29)
CO2 SERPL-SCNC: 20 MMOL/L (ref 22–29)
COLOR UR: ABNORMAL
CREAT SERPL-MCNC: 0.48 MG/DL (ref 0.57–1)
CREAT SERPL-MCNC: 0.58 MG/DL (ref 0.57–1)
DEPRECATED RDW RBC AUTO: 46.9 FL (ref 37–54)
EGFRCR SERPLBLD CKD-EPI 2021: 104.6 ML/MIN/1.73
EGFRCR SERPLBLD CKD-EPI 2021: 99.9 ML/MIN/1.73
EOSINOPHIL # BLD AUTO: 0.15 10*3/MM3 (ref 0–0.4)
EOSINOPHIL NFR BLD AUTO: 3.9 % (ref 0.3–6.2)
ERYTHROCYTE [DISTWIDTH] IN BLOOD BY AUTOMATED COUNT: 14 % (ref 12.3–15.4)
GLUCOSE SERPL-MCNC: 132 MG/DL (ref 65–99)
GLUCOSE SERPL-MCNC: 151 MG/DL (ref 65–99)
GLUCOSE UR STRIP-MCNC: ABNORMAL MG/DL
HCT VFR BLD AUTO: 32.1 % (ref 34–46.6)
HGB BLD-MCNC: 11 G/DL (ref 12–15.9)
HGB UR QL STRIP.AUTO: ABNORMAL
HYALINE CASTS UR QL AUTO: ABNORMAL /LPF
IMM GRANULOCYTES # BLD AUTO: 0.03 10*3/MM3 (ref 0–0.05)
IMM GRANULOCYTES NFR BLD AUTO: 0.8 % (ref 0–0.5)
KETONES UR QL STRIP: ABNORMAL
LEUKOCYTE ESTERASE UR QL STRIP.AUTO: ABNORMAL
LYMPHOCYTES # BLD AUTO: 1.47 10*3/MM3 (ref 0.7–3.1)
LYMPHOCYTES NFR BLD AUTO: 38.2 % (ref 19.6–45.3)
MCH RBC QN AUTO: 31.4 PG (ref 26.6–33)
MCHC RBC AUTO-ENTMCNC: 34.3 G/DL (ref 31.5–35.7)
MCV RBC AUTO: 91.7 FL (ref 79–97)
MONOCYTES # BLD AUTO: 0.36 10*3/MM3 (ref 0.1–0.9)
MONOCYTES NFR BLD AUTO: 9.4 % (ref 5–12)
NEUTROPHILS NFR BLD AUTO: 1.8 10*3/MM3 (ref 1.7–7)
NEUTROPHILS NFR BLD AUTO: 46.7 % (ref 42.7–76)
NITRITE UR QL STRIP: NEGATIVE
NRBC BLD AUTO-RTO: 0 /100 WBC (ref 0–0.2)
PH UR STRIP.AUTO: 5.5 [PH] (ref 5–9)
PHOSPHATE SERPL-MCNC: 2.5 MG/DL (ref 2.5–4.5)
PHOSPHATE SERPL-MCNC: 2.9 MG/DL (ref 2.5–4.5)
PLATELET # BLD AUTO: 177 10*3/MM3 (ref 140–450)
PMV BLD AUTO: 10.2 FL (ref 6–12)
POTASSIUM SERPL-SCNC: 3.7 MMOL/L (ref 3.5–5.2)
POTASSIUM SERPL-SCNC: 3.9 MMOL/L (ref 3.5–5.2)
PROT UR QL STRIP: ABNORMAL
RBC # BLD AUTO: 3.5 10*6/MM3 (ref 3.77–5.28)
RBC # UR STRIP: ABNORMAL /HPF
REF LAB TEST METHOD: ABNORMAL
SODIUM SERPL-SCNC: 136 MMOL/L (ref 136–145)
SODIUM SERPL-SCNC: 141 MMOL/L (ref 136–145)
SP GR UR STRIP: 1.03 (ref 1–1.03)
SQUAMOUS #/AREA URNS HPF: ABNORMAL /HPF
UROBILINOGEN UR QL STRIP: ABNORMAL
WBC # UR STRIP: ABNORMAL /HPF
WBC NRBC COR # BLD: 3.85 10*3/MM3 (ref 3.4–10.8)

## 2022-03-30 PROCEDURE — 25010000002 PIPERACILLIN SOD-TAZOBACTAM PER 1 G: Performed by: HOSPITALIST

## 2022-03-30 PROCEDURE — 84681 ASSAY OF C-PEPTIDE: CPT | Performed by: INTERNAL MEDICINE

## 2022-03-30 PROCEDURE — 63710000001 INSULIN ASPART PER 5 UNITS: Performed by: HOSPITALIST

## 2022-03-30 PROCEDURE — 82962 GLUCOSE BLOOD TEST: CPT

## 2022-03-30 PROCEDURE — 81001 URINALYSIS AUTO W/SCOPE: CPT | Performed by: HOSPITALIST

## 2022-03-30 PROCEDURE — 85025 COMPLETE CBC W/AUTO DIFF WBC: CPT | Performed by: HOSPITALIST

## 2022-03-30 PROCEDURE — 84100 ASSAY OF PHOSPHORUS: CPT | Performed by: FAMILY MEDICINE

## 2022-03-30 PROCEDURE — 86341 ISLET CELL ANTIBODY: CPT | Performed by: INTERNAL MEDICINE

## 2022-03-30 PROCEDURE — 80048 BASIC METABOLIC PNL TOTAL CA: CPT | Performed by: FAMILY MEDICINE

## 2022-03-30 RX ORDER — INSULIN LISPRO-AABC 100 [IU]/ML
INJECTION, SOLUTION SUBCUTANEOUS
Qty: 15 ML | Refills: 11 | Status: SHIPPED | OUTPATIENT
Start: 2022-03-30 | End: 2022-03-30 | Stop reason: ALTCHOICE

## 2022-03-30 RX ORDER — INSULIN ASPART INJECTION 100 [IU]/ML
INJECTION, SOLUTION SUBCUTANEOUS
Qty: 15 ML | Refills: 11 | Status: SHIPPED | OUTPATIENT
Start: 2022-03-30 | End: 2022-05-16 | Stop reason: SDUPTHER

## 2022-03-30 RX ADMIN — PRASUGREL 10 MG: 10 TABLET, FILM COATED ORAL at 08:31

## 2022-03-30 RX ADMIN — Medication 10 ML: at 08:32

## 2022-03-30 RX ADMIN — PIPERACILLIN AND TAZOBACTAM 3.38 G: 3; .375 INJECTION, POWDER, FOR SOLUTION INTRAVENOUS at 05:32

## 2022-03-30 RX ADMIN — CARVEDILOL 6.25 MG: 6.25 TABLET, FILM COATED ORAL at 08:31

## 2022-03-30 RX ADMIN — RANOLAZINE 1000 MG: 500 TABLET, FILM COATED, EXTENDED RELEASE ORAL at 08:31

## 2022-03-30 RX ADMIN — POTASSIUM PHOSPHATE, MONOBASIC AND POTASSIUM PHOSPHATE, DIBASIC 15 MMOL: 224; 236 INJECTION, SOLUTION, CONCENTRATE INTRAVENOUS at 06:36

## 2022-03-30 RX ADMIN — INSULIN ASPART 6 UNITS: 100 INJECTION, SOLUTION INTRAVENOUS; SUBCUTANEOUS at 08:35

## 2022-03-30 NOTE — PROGRESS NOTES
Stopping rybelsus due to not tolerating well  Starting SS with lymjev  Starting basal 10 with tresiba  Adding back januvia  Encouraged to stop synjardy during times of vomiting/dehydration

## 2022-03-30 NOTE — OUTREACH NOTE
Prep Survey    Flowsheet Row Responses   [facility] patient discharged from? [place]   Is LACE score < 7 ? No   Emergency Room discharge w/ pulse ox? No   Eligibility [facility]   Date of Admission 03/28/22   Date of Discharge 03/30/22   Discharge Disposition Home or Self Care   Discharge diagnosis Diabetic ketoacidosis without coma associated with type 2 diabetes    Does the patient have one of the following disease processes/diagnoses(primary or secondary)? Other   Does the patient have Home health ordered? No   Is there a DME ordered? No   Prep survey completed? Yes          ELIDIA FORTE - Registered Nurse

## 2022-03-31 ENCOUNTER — TRANSITIONAL CARE MANAGEMENT TELEPHONE ENCOUNTER (OUTPATIENT)
Dept: CALL CENTER | Facility: HOSPITAL | Age: 67
End: 2022-03-31

## 2022-03-31 LAB
C PEPTIDE SERPL-MCNC: 1.8 NG/ML (ref 1.1–4.4)
GLUCOSE BLDC GLUCOMTR-MCNC: 103 MG/DL (ref 70–130)

## 2022-03-31 NOTE — OUTREACH NOTE
Call Center TCM Note    Flowsheet Row Responses   [facility] [place] [facility] from? [place]   Does the patient have one of the following disease processes/diagnoses(primary or secondary)? Other   TCM attempt successful? No   Unsuccessful attempts Attempt 2          Deb Ireland MA    3/31/2022, 15:18 CDT

## 2022-03-31 NOTE — OUTREACH NOTE
Call Center TCM Note    Flowsheet Row Responses   [facility] patient discharged from? [place]   Does the patient have one of the following disease processes/diagnoses(primary or secondary)? Other   TCM attempt successful? No   Unsuccessful attempts Attempt 1          Deb Ireland MA    3/31/2022, 12:44 CDT

## 2022-04-01 ENCOUNTER — TRANSITIONAL CARE MANAGEMENT TELEPHONE ENCOUNTER (OUTPATIENT)
Dept: CALL CENTER | Facility: HOSPITAL | Age: 67
End: 2022-04-01

## 2022-04-01 LAB — GAD65 AB SER IA-ACNC: <5 U/ML (ref 0–5)

## 2022-04-02 LAB
BACTERIA SPEC AEROBE CULT: NORMAL
BACTERIA SPEC AEROBE CULT: NORMAL
QT INTERVAL: 316 MS
QTC INTERVAL: 435 MS

## 2022-04-11 ENCOUNTER — READMISSION MANAGEMENT (OUTPATIENT)
Dept: CALL CENTER | Facility: HOSPITAL | Age: 67
End: 2022-04-11

## 2022-04-11 NOTE — OUTREACH NOTE
Medical Week 2 Survey    Flowsheet Row Responses   [facility] patient discharged from? [place]   Does the patient have one of the following disease processes/diagnoses(primary or secondary)? Other   Week 2 attempt successful? No   Unsuccessful attempts Attempt 1   Discharge diagnosis Diabetic ketoacidosis without coma associated with type 2 diabetes    Rescheduled Revoked   Revoke Decline to participate   Is the patient interested in additional calls from an ambulatory ?  NOTE:  applies to high risk patients requiring additional follow-up. No   Revoked No further contact(revokes)-requires comment   Graduated/Revoked comments UTR x 4 attempts          NOVA FARNSWORTH - Registered Nurse

## 2022-04-12 RX ORDER — ICOSAPENT ETHYL 1000 MG/1
2 CAPSULE ORAL 2 TIMES DAILY WITH MEALS
Qty: 120 CAPSULE | Refills: 11 | Status: SHIPPED | OUTPATIENT
Start: 2022-04-12 | End: 2023-03-13

## 2022-05-04 ENCOUNTER — SPECIALTY PHARMACY (OUTPATIENT)
Dept: ENDOCRINOLOGY | Facility: CLINIC | Age: 67
End: 2022-05-04

## 2022-05-04 NOTE — PROGRESS NOTES
Specialty Pharmacy Refill Coordination Note     Bonnie is a 67 y.o. female contacted today regarding refills of  Januvia specialty medication(s).      Specialty medication(s) and dose(s) confirmed: yes    Refill Questions    Flowsheet Row Most Recent Value   Changes to allergies? No   Changes to medications? No   New conditions since last clinic visit No   Unplanned office visit, urgent care, ED, or hospital admission in the last 4 weeks  No   How does patient/caregiver feel medication is working? Very good   Financial problems or insurance changes  No   If yes, describe changes in insurance or financial issues. na   Does this patient require a clinical escalation to a pharmacist? No                     Follow-up: 1 month or as needed per RX.      Deondre L. Hai  Specialty Pharmacy Technician

## 2022-05-05 NOTE — PROGRESS NOTES
Spoke with ms. Garcia about her insulin which she feels is leaving her glucose uncontrolled. We also spoke of future insurance plans when moving to medicare. I believe she appreciated the info, will begin adjust her insulin dose based on her need, and was going to  an info packet about medicare part D from the pharmacy. Will follow up soon.

## 2022-05-12 ENCOUNTER — LAB (OUTPATIENT)
Dept: LAB | Facility: HOSPITAL | Age: 67
End: 2022-05-12

## 2022-05-12 LAB
25(OH)D3 SERPL-MCNC: 46.3 NG/ML (ref 30–100)
ALBUMIN SERPL-MCNC: 4.2 G/DL (ref 3.5–5.2)
ALBUMIN UR-MCNC: 1.3 MG/DL
ALBUMIN/GLOB SERPL: 1.8 G/DL
ALP SERPL-CCNC: 102 U/L (ref 39–117)
ALT SERPL W P-5'-P-CCNC: 14 U/L (ref 1–33)
ANION GAP SERPL CALCULATED.3IONS-SCNC: 9 MMOL/L (ref 5–15)
AST SERPL-CCNC: 13 U/L (ref 1–32)
BASOPHILS # BLD AUTO: 0.05 10*3/MM3 (ref 0–0.2)
BASOPHILS NFR BLD AUTO: 1 % (ref 0–1.5)
BILIRUB SERPL-MCNC: 0.6 MG/DL (ref 0–1.2)
BUN SERPL-MCNC: 20 MG/DL (ref 8–23)
BUN/CREAT SERPL: 34.5 (ref 7–25)
CALCIUM SPEC-SCNC: 9.7 MG/DL (ref 8.6–10.5)
CHLORIDE SERPL-SCNC: 104 MMOL/L (ref 98–107)
CHOLEST SERPL-MCNC: 273 MG/DL (ref 0–200)
CO2 SERPL-SCNC: 28 MMOL/L (ref 22–29)
CREAT SERPL-MCNC: 0.58 MG/DL (ref 0.57–1)
CREAT UR-MCNC: 54.7 MG/DL
DEPRECATED RDW RBC AUTO: 43.9 FL (ref 37–54)
EGFRCR SERPLBLD CKD-EPI 2021: 99.9 ML/MIN/1.73
EOSINOPHIL # BLD AUTO: 0.15 10*3/MM3 (ref 0–0.4)
EOSINOPHIL NFR BLD AUTO: 3 % (ref 0.3–6.2)
ERYTHROCYTE [DISTWIDTH] IN BLOOD BY AUTOMATED COUNT: 13.1 % (ref 12.3–15.4)
GLOBULIN UR ELPH-MCNC: 2.4 GM/DL
GLUCOSE SERPL-MCNC: 247 MG/DL (ref 65–99)
HBA1C MFR BLD: 7.9 % (ref 4.8–5.6)
HCT VFR BLD AUTO: 42.3 % (ref 34–46.6)
HDLC SERPL-MCNC: 44 MG/DL (ref 40–60)
HGB BLD-MCNC: 14.3 G/DL (ref 12–15.9)
IMM GRANULOCYTES # BLD AUTO: 0.02 10*3/MM3 (ref 0–0.05)
IMM GRANULOCYTES NFR BLD AUTO: 0.4 % (ref 0–0.5)
LDLC SERPL CALC-MCNC: 194 MG/DL (ref 0–100)
LDLC/HDLC SERPL: 4.36 {RATIO}
LYMPHOCYTES # BLD AUTO: 1.48 10*3/MM3 (ref 0.7–3.1)
LYMPHOCYTES NFR BLD AUTO: 29.7 % (ref 19.6–45.3)
MCH RBC QN AUTO: 31 PG (ref 26.6–33)
MCHC RBC AUTO-ENTMCNC: 33.8 G/DL (ref 31.5–35.7)
MCV RBC AUTO: 91.8 FL (ref 79–97)
MICROALBUMIN/CREAT UR: 23.8 MG/G
MONOCYTES # BLD AUTO: 0.48 10*3/MM3 (ref 0.1–0.9)
MONOCYTES NFR BLD AUTO: 9.6 % (ref 5–12)
NEUTROPHILS NFR BLD AUTO: 2.8 10*3/MM3 (ref 1.7–7)
NEUTROPHILS NFR BLD AUTO: 56.3 % (ref 42.7–76)
NRBC BLD AUTO-RTO: 0 /100 WBC (ref 0–0.2)
PLATELET # BLD AUTO: 263 10*3/MM3 (ref 140–450)
PMV BLD AUTO: 10.6 FL (ref 6–12)
POTASSIUM SERPL-SCNC: 4.9 MMOL/L (ref 3.5–5.2)
PROT SERPL-MCNC: 6.6 G/DL (ref 6–8.5)
RBC # BLD AUTO: 4.61 10*6/MM3 (ref 3.77–5.28)
SODIUM SERPL-SCNC: 141 MMOL/L (ref 136–145)
TRIGL SERPL-MCNC: 185 MG/DL (ref 0–150)
TSH SERPL DL<=0.05 MIU/L-ACNC: 2.1 UIU/ML (ref 0.27–4.2)
VIT B12 BLD-MCNC: 1534 PG/ML (ref 211–946)
VLDLC SERPL-MCNC: 35 MG/DL (ref 5–40)
WBC NRBC COR # BLD: 4.98 10*3/MM3 (ref 3.4–10.8)

## 2022-05-12 PROCEDURE — 82306 VITAMIN D 25 HYDROXY: CPT | Performed by: INTERNAL MEDICINE

## 2022-05-12 PROCEDURE — 82043 UR ALBUMIN QUANTITATIVE: CPT | Performed by: INTERNAL MEDICINE

## 2022-05-12 PROCEDURE — 80050 GENERAL HEALTH PANEL: CPT | Performed by: INTERNAL MEDICINE

## 2022-05-12 PROCEDURE — 82607 VITAMIN B-12: CPT | Performed by: INTERNAL MEDICINE

## 2022-05-12 PROCEDURE — 36415 COLL VENOUS BLD VENIPUNCTURE: CPT | Performed by: INTERNAL MEDICINE

## 2022-05-12 PROCEDURE — 82570 ASSAY OF URINE CREATININE: CPT | Performed by: INTERNAL MEDICINE

## 2022-05-12 PROCEDURE — 80061 LIPID PANEL: CPT | Performed by: INTERNAL MEDICINE

## 2022-05-12 PROCEDURE — 83036 HEMOGLOBIN GLYCOSYLATED A1C: CPT | Performed by: INTERNAL MEDICINE

## 2022-05-16 ENCOUNTER — SPECIALTY PHARMACY (OUTPATIENT)
Dept: ENDOCRINOLOGY | Facility: CLINIC | Age: 67
End: 2022-05-16

## 2022-05-16 ENCOUNTER — OFFICE VISIT (OUTPATIENT)
Dept: ENDOCRINOLOGY | Facility: CLINIC | Age: 67
End: 2022-05-16

## 2022-05-16 VITALS
BODY MASS INDEX: 28.17 KG/M2 | HEART RATE: 77 BPM | DIASTOLIC BLOOD PRESSURE: 60 MMHG | SYSTOLIC BLOOD PRESSURE: 120 MMHG | WEIGHT: 159 LBS | HEIGHT: 63 IN | OXYGEN SATURATION: 97 %

## 2022-05-16 DIAGNOSIS — I15.2 HYPERTENSION ASSOCIATED WITH DIABETES: ICD-10-CM

## 2022-05-16 DIAGNOSIS — R80.9 GRADE A2 ALBUMINURIA: ICD-10-CM

## 2022-05-16 DIAGNOSIS — E11.69 MIXED DIABETIC HYPERLIPIDEMIA ASSOCIATED WITH TYPE 2 DIABETES MELLITUS: ICD-10-CM

## 2022-05-16 DIAGNOSIS — E78.2 MIXED DIABETIC HYPERLIPIDEMIA ASSOCIATED WITH TYPE 2 DIABETES MELLITUS: ICD-10-CM

## 2022-05-16 DIAGNOSIS — E65 LIPOHYPERTROPHY: ICD-10-CM

## 2022-05-16 DIAGNOSIS — E53.8 B12 DEFICIENCY: ICD-10-CM

## 2022-05-16 DIAGNOSIS — I43 TYPE 2 DIABETES MELLITUS WITH DIABETIC CARDIOMYOPATHY: Primary | ICD-10-CM

## 2022-05-16 DIAGNOSIS — G57.12 MERALGIA PARAESTHETICA, LEFT: ICD-10-CM

## 2022-05-16 DIAGNOSIS — E11.59 HYPERTENSION ASSOCIATED WITH DIABETES: ICD-10-CM

## 2022-05-16 DIAGNOSIS — E55.9 VITAMIN D DEFICIENCY: ICD-10-CM

## 2022-05-16 DIAGNOSIS — E11.69 TYPE 2 DIABETES MELLITUS WITH DIABETIC CARDIOMYOPATHY: Primary | ICD-10-CM

## 2022-05-16 PROCEDURE — 99214 OFFICE O/P EST MOD 30 MIN: CPT | Performed by: INTERNAL MEDICINE

## 2022-05-16 RX ORDER — EMPAGLIFLOZIN, METFORMIN HYDROCHLORIDE 5; 1000 MG/1; MG/1
2 TABLET, EXTENDED RELEASE ORAL
Qty: 60 TABLET | Refills: 11 | Status: SHIPPED | OUTPATIENT
Start: 2022-05-16

## 2022-05-16 NOTE — PROGRESS NOTES
Went to 22 units of basal  Has adjusted ss some  A1c of 7.9     Will continue to adjust basal and ss  Will restart synjardy  Picking up medicine wendesday  We will make sure there are coupons loaded   And continue to follow for insulin adjustments

## 2022-05-16 NOTE — PROGRESS NOTES
" Bonnie Garcia is a 67 y.o. female who presents for  evaluation of   Chief Complaint   Patient presents with   • Diabetes     T2       HPI      Duration/Timing:    Age at onset of diabetes: 58 years      Severity (Complications/Hospitalizations)  Secondary Macrovascular Complications:  CAD, No CVA, No PAD  has 50 to 50% LAD     PTCI , stent x 5    2 in proximal LAD    1 in circumflex    may need heart surgery   Secondary Microvascular Complications:  No Diabetic Nephropathy, No Diabetic Retinopathy, No Diabetic Neuropathy    Context  Diabetes Regimen:  Oral Medications     Associated Signs/Symptoms  None at this time     Developed DKA in the setting of UTI, dehydration and ongoing sglt2 I     =============      Also h o thyroid nodule  location - right  timing constant  not progressive  severity - low -  bx , Sept 2008 - benign   biochemically euthyroid , off synthroid but used to be on 25 mcgs daily     thyroid US personally reviewed    Sept 2008    1.8 x 1.7 x 1.4 , right , solid , heterogeneous, hypoechoic.     Repeat US June 2013    r solid nodule stable in size, echogenicity consistent w Hashimoto's     PE    /60   Pulse 77   Ht 160 cm (63\")   Wt 72.1 kg (159 lb)   SpO2 97%   BMI 28.17 kg/m²   AOx3  No visible goiter  Normal Respiratory Effort , Lung CTA  RRR  No Edema    Labs    Lab Results   Component Value Date    WBC 4.98 05/12/2022    HGB 14.3 05/12/2022    HCT 42.3 05/12/2022    MCV 91.8 05/12/2022     05/12/2022     Lab Results   Component Value Date    GLUCOSE 247 (H) 05/12/2022    BUN 20 05/12/2022    CREATININE 0.58 05/12/2022    EGFRIFNONA 133 11/15/2021    BCR 34.5 (H) 05/12/2022    K 4.9 05/12/2022    CO2 28.0 05/12/2022    CALCIUM 9.7 05/12/2022    ALBUMIN 4.20 05/12/2022    AST 13 05/12/2022    ALT 14 05/12/2022           Assessment & Plan       ICD-10-CM ICD-9-CM   1. Type 2 diabetes mellitus with diabetic cardiomyopathy (HCC)  E11.69 250.80    I43 425.7   2. Hypertension " associated with diabetes (HCC)  E11.59 250.80    I15.2 401.9   3. Mixed diabetic hyperlipidemia associated with type 2 diabetes mellitus (HCC)  E11.69 250.80    E78.2 272.2   4. Vitamin D deficiency  E55.9 268.9   5. B12 deficiency  E53.8 266.2   6. Grade A2 albuminuria  R80.9 791.0   7. Lipohypertrophy  E65 278.1   8. Meralgia paraesthetica, left  G57.12 355.1         Glycemic Management:      Lab Results   Component Value Date    HGBA1C 7.90 (H) 05/12/2022    HGBA1C 9.20 (H) 03/28/2022    HGBA1C 9.9 (H) 03/08/2022     Lab Results   Component Value Date    MICROALBUR 1.3 05/12/2022    CREATININE 0.58 05/12/2022       Januvia 100 mg daily  Stop   Vomited on 3 mg of rybelsus     --    Was on synjardy that was stopped during viral illness - can stay off of it   Restart synjardy     --    She will sign up for the senior saving program    --    Tresiba 20 units -22   Self adjust for target of fasting 80 to 130 , increase by 2 units every 4 days but don't exceed 34 units     Fiasp   Sliding scale of 2 per 50     Need to add 1 unit per 10 grams , target less than 180       Lipid Management  Lab Results   Component Value Date    CHOL 273 (H) 05/12/2022    CHOL 253 (H) 11/15/2021    CHOL 263 (H) 02/03/2021     Lab Results   Component Value Date    TRIG 185 (H) 05/12/2022    TRIG 981 (H) 03/08/2022    TRIG 286 (H) 11/15/2021     Lab Results   Component Value Date    HDL 44 05/12/2022    HDL 32 (L) 03/08/2022    HDL 43 11/15/2021       Lab Results   Component Value Date     (H) 05/12/2022    LDL UNABLE TO CALCULATE 03/08/2022     (H) 11/15/2021     The 10-year ASCVD risk score (Ggii DASILVA Jr., et al., 2013) is: 17%    Values used to calculate the score:      Age: 67 years      Sex: Female      Is Non- : No      Diabetic: Yes      Tobacco smoker: No      Systolic Blood Pressure: 120 mmHg      Is BP treated: Yes      HDL Cholesterol: 44 mg/dL      Total Cholesterol: 273 mg/dL      LDL  "elevated    On fenofibrate   on vascepa ( intolerant to lovaza )    Tried  crestor 5 mg but developed myalgias. Not able to tolerate even every other day   has tried lipitor, zocor, livalo, fluvastatin since 2010 and retried in 2017 and 2018  but hasn't been able to tolerate due to myalgias     Presently only on zetia 10 mg daily - no longer needed     On repatha   Late refill     Blood Pressure Management:    /60   Pulse 77   Ht 160 cm (63\")   Wt 72.1 kg (159 lb)   SpO2 97%   BMI 28.17 kg/m²     on coreg cr 20 mg daily and  losartan 25 mg   was on benicar ,  off ACEi due to cough    edema left > r , use compression stockings    No longer a problem   Microvascular Complication Monitoring:  No Microalbuminuria, Date of last Microalbumin Assessment 10/08/2015, No Diabetic Retinopathy, No Diabetic     +  neuropathy   Meralgia paresthetica  Start neurontin 300 mg tid had to back off to 100 mg po bid - can't tolerate , doing better    Lab Results   Component Value Date    MALBCRERATIO 23.8 05/12/2022    MALBCRERATIO 32.5 11/15/2021     Moderate albuminuria   Prefers no more meds        Weight Changes     Add contrave to lifestyle changes - felt she was going to die    Add belviq, side effects      Bone Health  Vit D 50 th weekly and levels are nl   Other Diabetes Related Aspects  Right Thyroid nodule      R nodule , dimensions in HPI , stable from 2008 to 2013     she has Hashimoto's proven on US and + TPO ab, euthyroid June 2013  used to be on Lt4 at 25 mcgs but d/c years ago  follow TSH - nl     Lab Results   Component Value Date    TSH 2.100 05/12/2022         --    10-15  nl B12 but low normal, start b12     having some unsteadiness and numbness    Lab Results   Component Value Date    DPHRYNTY91 1,534 (H) 05/12/2022     Please take in addition to MVI    No orders of the defined types were placed in this encounter.      "

## 2022-05-16 NOTE — PATIENT INSTRUCTIONS
Senior Saving Model     [phone]    Call above number and make certain you get advised on the plan that will provide insulin for no more than 35 dollars per month

## 2022-05-24 ENCOUNTER — SPECIALTY PHARMACY (OUTPATIENT)
Dept: ENDOCRINOLOGY | Facility: CLINIC | Age: 67
End: 2022-05-24

## 2022-05-24 RX ORDER — PROCHLORPERAZINE 25 MG/1
1 SUPPOSITORY RECTAL ONCE
Qty: 1 EACH | Refills: 0 | Status: SHIPPED | OUTPATIENT
Start: 2022-05-24 | End: 2022-05-25

## 2022-05-24 RX ORDER — PROCHLORPERAZINE 25 MG/1
SUPPOSITORY RECTAL
Qty: 3 EACH | Refills: 11 | Status: SHIPPED | OUTPATIENT
Start: 2022-05-24

## 2022-05-24 RX ORDER — PROCHLORPERAZINE 25 MG/1
1 SUPPOSITORY RECTAL
Qty: 1 EACH | Refills: 4 | Status: SHIPPED | OUTPATIENT
Start: 2022-05-24

## 2022-06-06 ENCOUNTER — SPECIALTY PHARMACY (OUTPATIENT)
Dept: ENDOCRINOLOGY | Facility: CLINIC | Age: 67
End: 2022-06-06

## 2022-06-06 NOTE — PROGRESS NOTES
Recently filled her tresiba so I completed the refill call to help them line up better  Was ready for a refill on repatha.   Since a $0 copay on everything right now, I went ahead and ran out 90 days of synjardy XR to help her med sync as well.   No issues and we communicate often to assist MsJudy Jose

## 2022-06-17 RX ORDER — CARVEDILOL PHOSPHATE 20 MG/1
20 CAPSULE, EXTENDED RELEASE ORAL DAILY
Qty: 30 CAPSULE | Refills: 11 | Status: SHIPPED | OUTPATIENT
Start: 2022-06-17 | End: 2023-03-13

## 2022-08-11 ENCOUNTER — SPECIALTY PHARMACY (OUTPATIENT)
Dept: PHARMACY | Facility: HOSPITAL | Age: 67
End: 2022-08-11

## 2022-08-22 ENCOUNTER — LAB (OUTPATIENT)
Dept: LAB | Facility: HOSPITAL | Age: 67
End: 2022-08-22

## 2022-08-22 LAB
25(OH)D3 SERPL-MCNC: 51.2 NG/ML (ref 30–100)
ALBUMIN SERPL-MCNC: 4.5 G/DL (ref 3.5–5.2)
ALBUMIN UR-MCNC: 1.4 MG/DL
ALBUMIN/GLOB SERPL: 2.1 G/DL
ALP SERPL-CCNC: 86 U/L (ref 39–117)
ALT SERPL W P-5'-P-CCNC: 19 U/L (ref 1–33)
ANION GAP SERPL CALCULATED.3IONS-SCNC: 9 MMOL/L (ref 5–15)
AST SERPL-CCNC: 20 U/L (ref 1–32)
BASOPHILS # BLD AUTO: 0.05 10*3/MM3 (ref 0–0.2)
BASOPHILS NFR BLD AUTO: 0.9 % (ref 0–1.5)
BILIRUB SERPL-MCNC: 0.6 MG/DL (ref 0–1.2)
BUN SERPL-MCNC: 11 MG/DL (ref 8–23)
BUN/CREAT SERPL: 18.6 (ref 7–25)
CALCIUM SPEC-SCNC: 9.1 MG/DL (ref 8.6–10.5)
CHLORIDE SERPL-SCNC: 105 MMOL/L (ref 98–107)
CHOLEST SERPL-MCNC: 114 MG/DL (ref 0–200)
CO2 SERPL-SCNC: 28 MMOL/L (ref 22–29)
CREAT SERPL-MCNC: 0.59 MG/DL (ref 0.57–1)
CREAT UR-MCNC: 40.2 MG/DL
DEPRECATED RDW RBC AUTO: 42.5 FL (ref 37–54)
EGFRCR SERPLBLD CKD-EPI 2021: 99.5 ML/MIN/1.73
EOSINOPHIL # BLD AUTO: 0.12 10*3/MM3 (ref 0–0.4)
EOSINOPHIL NFR BLD AUTO: 2.2 % (ref 0.3–6.2)
ERYTHROCYTE [DISTWIDTH] IN BLOOD BY AUTOMATED COUNT: 13.1 % (ref 12.3–15.4)
GLOBULIN UR ELPH-MCNC: 2.1 GM/DL
GLUCOSE SERPL-MCNC: 185 MG/DL (ref 65–99)
HBA1C MFR BLD: 8.4 % (ref 4.8–5.6)
HCT VFR BLD AUTO: 40.9 % (ref 34–46.6)
HDLC SERPL-MCNC: 39 MG/DL (ref 40–60)
HGB BLD-MCNC: 13.9 G/DL (ref 12–15.9)
IMM GRANULOCYTES # BLD AUTO: 0.07 10*3/MM3 (ref 0–0.05)
IMM GRANULOCYTES NFR BLD AUTO: 1.3 % (ref 0–0.5)
LDLC SERPL CALC-MCNC: 36 MG/DL (ref 0–100)
LDLC/HDLC SERPL: 0.63 {RATIO}
LYMPHOCYTES # BLD AUTO: 1.4 10*3/MM3 (ref 0.7–3.1)
LYMPHOCYTES NFR BLD AUTO: 26.2 % (ref 19.6–45.3)
MCH RBC QN AUTO: 30.5 PG (ref 26.6–33)
MCHC RBC AUTO-ENTMCNC: 34 G/DL (ref 31.5–35.7)
MCV RBC AUTO: 89.9 FL (ref 79–97)
MICROALBUMIN/CREAT UR: 34.8 MG/G
MONOCYTES # BLD AUTO: 0.38 10*3/MM3 (ref 0.1–0.9)
MONOCYTES NFR BLD AUTO: 7.1 % (ref 5–12)
NEUTROPHILS NFR BLD AUTO: 3.33 10*3/MM3 (ref 1.7–7)
NEUTROPHILS NFR BLD AUTO: 62.3 % (ref 42.7–76)
NRBC BLD AUTO-RTO: 0 /100 WBC (ref 0–0.2)
PLATELET # BLD AUTO: 236 10*3/MM3 (ref 140–450)
PMV BLD AUTO: 10.4 FL (ref 6–12)
POTASSIUM SERPL-SCNC: 4.8 MMOL/L (ref 3.5–5.2)
PROT SERPL-MCNC: 6.6 G/DL (ref 6–8.5)
RBC # BLD AUTO: 4.55 10*6/MM3 (ref 3.77–5.28)
SODIUM SERPL-SCNC: 142 MMOL/L (ref 136–145)
TRIGL SERPL-MCNC: 252 MG/DL (ref 0–150)
TSH SERPL DL<=0.05 MIU/L-ACNC: 2.01 UIU/ML (ref 0.27–4.2)
VIT B12 BLD-MCNC: 1601 PG/ML (ref 211–946)
VLDLC SERPL-MCNC: 39 MG/DL (ref 5–40)
WBC NRBC COR # BLD: 5.35 10*3/MM3 (ref 3.4–10.8)

## 2022-08-22 PROCEDURE — 80061 LIPID PANEL: CPT | Performed by: INTERNAL MEDICINE

## 2022-08-22 PROCEDURE — 82306 VITAMIN D 25 HYDROXY: CPT | Performed by: INTERNAL MEDICINE

## 2022-08-22 PROCEDURE — 82570 ASSAY OF URINE CREATININE: CPT | Performed by: INTERNAL MEDICINE

## 2022-08-22 PROCEDURE — 82043 UR ALBUMIN QUANTITATIVE: CPT | Performed by: INTERNAL MEDICINE

## 2022-08-22 PROCEDURE — 83036 HEMOGLOBIN GLYCOSYLATED A1C: CPT | Performed by: INTERNAL MEDICINE

## 2022-08-22 PROCEDURE — 80050 GENERAL HEALTH PANEL: CPT | Performed by: INTERNAL MEDICINE

## 2022-08-22 PROCEDURE — 36415 COLL VENOUS BLD VENIPUNCTURE: CPT | Performed by: INTERNAL MEDICINE

## 2022-08-22 PROCEDURE — 82607 VITAMIN B-12: CPT | Performed by: INTERNAL MEDICINE

## 2022-08-26 ENCOUNTER — OFFICE VISIT (OUTPATIENT)
Dept: ENDOCRINOLOGY | Facility: CLINIC | Age: 67
End: 2022-08-26

## 2022-08-26 VITALS
BODY MASS INDEX: 29.06 KG/M2 | DIASTOLIC BLOOD PRESSURE: 58 MMHG | HEIGHT: 63 IN | OXYGEN SATURATION: 95 % | SYSTOLIC BLOOD PRESSURE: 124 MMHG | WEIGHT: 164 LBS | HEART RATE: 79 BPM

## 2022-08-26 DIAGNOSIS — E11.69 MIXED DIABETIC HYPERLIPIDEMIA ASSOCIATED WITH TYPE 2 DIABETES MELLITUS: ICD-10-CM

## 2022-08-26 DIAGNOSIS — I15.2 HYPERTENSION ASSOCIATED WITH DIABETES: ICD-10-CM

## 2022-08-26 DIAGNOSIS — E78.2 MIXED DIABETIC HYPERLIPIDEMIA ASSOCIATED WITH TYPE 2 DIABETES MELLITUS: ICD-10-CM

## 2022-08-26 DIAGNOSIS — E11.69 TYPE 2 DIABETES MELLITUS WITH DIABETIC CARDIOMYOPATHY: Primary | ICD-10-CM

## 2022-08-26 DIAGNOSIS — I43 TYPE 2 DIABETES MELLITUS WITH DIABETIC CARDIOMYOPATHY: Primary | ICD-10-CM

## 2022-08-26 DIAGNOSIS — E11.59 HYPERTENSION ASSOCIATED WITH DIABETES: ICD-10-CM

## 2022-08-26 DIAGNOSIS — E55.9 VITAMIN D DEFICIENCY: ICD-10-CM

## 2022-08-26 DIAGNOSIS — E53.8 B12 DEFICIENCY: ICD-10-CM

## 2022-08-26 DIAGNOSIS — R80.9 GRADE A2 ALBUMINURIA: ICD-10-CM

## 2022-08-26 PROCEDURE — 99214 OFFICE O/P EST MOD 30 MIN: CPT | Performed by: INTERNAL MEDICINE

## 2022-08-26 PROCEDURE — 95251 CONT GLUC MNTR ANALYSIS I&R: CPT | Performed by: INTERNAL MEDICINE

## 2022-08-26 NOTE — PROGRESS NOTES
" Bonnie Garcia is a 67 y.o. female who presents for  evaluation of   Chief Complaint   Patient presents with   • Diabetes     T2       HPI      Duration/Timing:    Age at onset of diabetes: 58 years      Severity (Complications/Hospitalizations)  Secondary Macrovascular Complications:  CAD, No CVA, No PAD  has 50 to 50% LAD     PTCI , stent x 5    2 in proximal LAD    1 in circumflex    may need heart surgery   Secondary Microvascular Complications:  No Diabetic Nephropathy, No Diabetic Retinopathy, No Diabetic Neuropathy    Context  Diabetes Regimen:  Oral Medications     Associated Signs/Symptoms  None at this time     Developed DKA in the setting of UTI, dehydration and ongoing sglt2 I     =============      Also h o thyroid nodule  location - right  timing constant  not progressive  severity - low -  bx , Sept 2008 - benign   biochemically euthyroid , off synthroid but used to be on 25 mcgs daily     thyroid US personally reviewed    Sept 2008    1.8 x 1.7 x 1.4 , right , solid , heterogeneous, hypoechoic.     Repeat US June 2013    r solid nodule stable in size, echogenicity consistent w Hashimoto's     PE    /58   Pulse 79   Ht 160 cm (63\")   Wt 74.4 kg (164 lb)   SpO2 95%   BMI 29.05 kg/m²   AOx3  No goiter  Normal Respiratory Effort , Lung CTA  RRR  No Edema    Labs    Lab Results   Component Value Date    WBC 5.35 08/22/2022    HGB 13.9 08/22/2022    HCT 40.9 08/22/2022    MCV 89.9 08/22/2022     08/22/2022     Lab Results   Component Value Date    GLUCOSE 185 (H) 08/22/2022    BUN 11 08/22/2022    CREATININE 0.59 08/22/2022    EGFRIFNONA 133 11/15/2021    BCR 18.6 08/22/2022    K 4.8 08/22/2022    CO2 28.0 08/22/2022    CALCIUM 9.1 08/22/2022    ALBUMIN 4.50 08/22/2022    AST 20 08/22/2022    ALT 19 08/22/2022           Assessment & Plan       ICD-10-CM ICD-9-CM   1. Type 2 diabetes mellitus with diabetic cardiomyopathy (HCC)  E11.69 250.80    I43 425.7   2. Hypertension associated " with diabetes (HCC)  E11.59 250.80    I15.2 401.9   3. Mixed diabetic hyperlipidemia associated with type 2 diabetes mellitus (HCC)  E11.69 250.80    E78.2 272.2   4. Vitamin D deficiency  E55.9 268.9   5. B12 deficiency  E53.8 266.2   6. Grade A2 albuminuria  R80.9 791.0         Glycemic Management:      Lab Results   Component Value Date    HGBA1C 8.40 (H) 08/22/2022    HGBA1C 7.90 (H) 05/12/2022    HGBA1C 9.20 (H) 03/28/2022     Lab Results   Component Value Date    MICROALBUR 1.4 08/22/2022    CREATININE 0.59 08/22/2022       Januvia 100 mg daily  Stop   Vomited on 3 mg of rybelsus     --     synjardy     --    She will sign up for the senior saving program    --    Tresiba 20 units -22   Self adjust for target of fasting 80 to 130 , increase by 2 units every 4 days but don't exceed 34 units     Fiasp   Sliding scale of 2 per 50     Need to add 1 unit per 10 grams , target less than 180     dexcom , 2 week review, scanned in     Time in Target 8%  No lows  Clear post meal rise , nlization overnight  Conclusion , type 2 diabetes with hyperglycemia     Lipid Management  Lab Results   Component Value Date    CHOL 114 08/22/2022    CHOL 273 (H) 05/12/2022    CHOL 253 (H) 11/15/2021     Lab Results   Component Value Date    TRIG 252 (H) 08/22/2022    TRIG 185 (H) 05/12/2022    TRIG 981 (H) 03/08/2022     Lab Results   Component Value Date    HDL 39 (L) 08/22/2022    HDL 44 05/12/2022    HDL 32 (L) 03/08/2022       Lab Results   Component Value Date    LDL 36 08/22/2022     (H) 05/12/2022    LDL UNABLE TO CALCULATE 03/08/2022     The ASCVD Risk score (Gigivanessa DASILVA Jr., et al., 2013) failed to calculate for the following reasons:    The valid total cholesterol range is 130 to 320 mg/dL       On fenofibrate   on vascepa ( intolerant to lovaza )    Tried  crestor 5 mg but developed myalgias. Not able to tolerate even every other day   has tried lipitor, zocor, livalo, fluvastatin since 2010 and retried in 2017 and 2018  " but hasn't been able to tolerate due to myalgias     Presently only on zetia 10 mg daily - no longer needed     On repatha and effective        Blood Pressure Management:    /58   Pulse 79   Ht 160 cm (63\")   Wt 74.4 kg (164 lb)   SpO2 95%   BMI 29.05 kg/m²     on coreg 12.5 bid   losartan 25 mg   was on benicar ,  off ACEi due to cough    edema left > r , use compression stockings    No longer a problem   Microvascular Complication Monitoring:  No Microalbuminuria, Date of last Microalbumin Assessment 10/08/2015, No Diabetic Retinopathy, No Diabetic     +  neuropathy   Meralgia paresthetica  Start neurontin 300 mg tid had to back off to 100 mg po bid - can't tolerate , doing better    Lab Results   Component Value Date    MALBCRERATIO 34.8 08/22/2022    MALBCRERATIO 23.8 05/12/2022     Moderate albuminuria   Prefers no more meds  She is on aldactone         Weight Changes     Add contrave to lifestyle changes - felt she was going to die   intolerant to rybelsus 3 mg     Bone Health  Vit D 50 th weekly and levels are nl   Other Diabetes Related Aspects  Right Thyroid nodule      R nodule , dimensions in HPI , stable from 2008 to 2013     she has Hashimoto's proven on US and + TPO ab, euthyroid June 2013  used to be on Lt4 at 25 mcgs but d/c years ago  follow TSH - nl     Lab Results   Component Value Date    TSH 2.010 08/22/2022         --    10-15  nl B12 but low normal, start b12     having some unsteadiness and numbness    Lab Results   Component Value Date    [id number] 1,601 (H) 08/22/2022     Please take in addition to MVI    No orders of the defined types were placed in this encounter.          This document has been electronically signed by Victorino Leyva MD on August 26, 2022 10:29 CDT      "

## 2022-09-13 ENCOUNTER — TELEPHONE (OUTPATIENT)
Dept: ENDOCRINOLOGY | Facility: CLINIC | Age: 67
End: 2022-09-13

## 2022-09-13 RX ORDER — ERGOCALCIFEROL 1.25 MG/1
50000 CAPSULE ORAL
Qty: 4 CAPSULE | Refills: 11 | Status: SHIPPED | OUTPATIENT
Start: 2022-09-13

## 2022-09-26 DIAGNOSIS — M19.90 ARTHRITIS: ICD-10-CM

## 2022-09-26 RX ORDER — MELOXICAM 7.5 MG/1
7.5 TABLET ORAL DAILY
Qty: 30 TABLET | Refills: 1 | Status: SHIPPED | OUTPATIENT
Start: 2022-09-26

## 2022-09-26 NOTE — TELEPHONE ENCOUNTER
Incoming Refill Request      Medication requested (name and dose):   meloxicam (MOBIC) 7.5 MG tablet    Pharmacy where request should be sent:   [facility] in [place]    Additional details provided by patient:     Best call back number:   [phone]    Does the patient have less than a 3 day supply:  [x] Yes  [] No    Velasquez Morin Rep  09/26/22, 14:03 CDT

## 2022-10-04 ENCOUNTER — DOCUMENTATION (OUTPATIENT)
Dept: ENDOCRINOLOGY | Facility: CLINIC | Age: 67
End: 2022-10-04

## 2022-10-11 ENCOUNTER — DOCUMENTATION (OUTPATIENT)
Dept: ENDOCRINOLOGY | Facility: CLINIC | Age: 67
End: 2022-10-11

## 2022-10-25 NOTE — OUTREACH NOTE
Call Center TCM Note    Flowsheet Row Responses   [facility] patient discharged from? [place]   Does the patient have one of the following disease processes/diagnoses(primary or secondary)? Other   TCM attempt successful? No   Unsuccessful attempts Attempt 3          Mesha Jansen RN    4/1/2022, 10:06 EDT       None

## 2022-11-15 ENCOUNTER — OFFICE VISIT (OUTPATIENT)
Dept: FAMILY MEDICINE CLINIC | Facility: CLINIC | Age: 67
End: 2022-11-15

## 2022-11-15 VITALS
SYSTOLIC BLOOD PRESSURE: 124 MMHG | HEART RATE: 87 BPM | WEIGHT: 164 LBS | DIASTOLIC BLOOD PRESSURE: 78 MMHG | BODY MASS INDEX: 29.06 KG/M2 | OXYGEN SATURATION: 96 % | HEIGHT: 63 IN

## 2022-11-15 DIAGNOSIS — Z23 NEED FOR PNEUMOCOCCAL VACCINATION: ICD-10-CM

## 2022-11-15 DIAGNOSIS — Z00.00 MEDICARE ANNUAL WELLNESS VISIT, SUBSEQUENT: Primary | ICD-10-CM

## 2022-11-15 DIAGNOSIS — I10 PRIMARY HYPERTENSION: ICD-10-CM

## 2022-11-15 DIAGNOSIS — E11.69 TYPE 2 DIABETES MELLITUS WITH DIABETIC CARDIOMYOPATHY: ICD-10-CM

## 2022-11-15 DIAGNOSIS — E78.00 PURE HYPERCHOLESTEROLEMIA: ICD-10-CM

## 2022-11-15 DIAGNOSIS — I43 TYPE 2 DIABETES MELLITUS WITH DIABETIC CARDIOMYOPATHY: ICD-10-CM

## 2022-11-15 PROCEDURE — G0439 PPPS, SUBSEQ VISIT: HCPCS | Performed by: NURSE PRACTITIONER

## 2022-11-15 PROCEDURE — G0009 ADMIN PNEUMOCOCCAL VACCINE: HCPCS | Performed by: NURSE PRACTITIONER

## 2022-11-15 PROCEDURE — 90677 PCV20 VACCINE IM: CPT | Performed by: NURSE PRACTITIONER

## 2022-11-15 PROCEDURE — 1170F FXNL STATUS ASSESSED: CPT | Performed by: NURSE PRACTITIONER

## 2022-11-15 PROCEDURE — 1159F MED LIST DOCD IN RCRD: CPT | Performed by: NURSE PRACTITIONER

## 2022-11-15 NOTE — PROGRESS NOTES
The ABCs of the Annual Wellness Visit  Subsequent Medicare Wellness Visit    Chief Complaint   Patient presents with   • Annual Exam      Subjective    History of Present Illness:  Bonnie Garcia is a 67 y.o. female who presents for a Subsequent Medicare Wellness Visit.    The following portions of the patient's history were reviewed and   updated as appropriate:   Current Outpatient Medications   Medication Sig Dispense Refill   • aspirin 81 MG EC tablet Take 324 mg by mouth every night at bedtime. Pt take 4-81 mg tablet at bedtime.     • Calcium Carbonate-Vit D-Min (Caltrate 600+D Plus Minerals) 600-800 MG-UNIT tablet Take 1 capsule by mouth 2 (Two) Times a Day With Meals. 180 tablet 3   • carvedilol (Coreg) 12.5 MG tablet Take 1 tablet by mouth two times daily. 180 tablet 3   • carvedilol CR (COREG CR) 20 MG 24 hr capsule Take 1 capsule by mouth Daily. 30 capsule 11   • Continuous Blood Gluc Sensor (Dexcom G6 Sensor) Use as directed for continuous glucose monitoring. Change sensors Every 10 (Ten) Days. 3 each 11   • Continuous Blood Gluc Transmit (Dexcom G6 Transmitter) misc Use as directed for continuous glucose monitoring.  Change transmitter Every 3 (Three) Months. 1 each 4   • Cyanocobalamin (B-12) 500 MCG sublingual tablet 500 mcgs under the tongue every other day (Patient taking differently: Daily. 500 mcgs under the tongue daily) 15 tablet 11   • Empagliflozin-metFORMIN HCl ER (Synjardy XR) 5-1000 MG tablet sustained-release 24 hour Take 2 tablets by mouth Daily With Breakfast. 60 tablet 11   • fenofibrate (TRICOR) 145 MG tablet Take 1 tablet by mouth Daily. 90 tablet 3   • icosapent ethyl (Vascepa) 1 g capsule capsule Take 2 capsules by mouth 2 (Two) Times a Day With Meals. 120 capsule 11   • Insulin Aspart, w/Niacinamide, 100 UNIT/ML solution pen-injector Inject up to 20 units under the skin into the appropriate area as directed with meals 15 mL 11   • insulin degludec (TRESIBA FLEXTOUCH) 100 UNIT/ML  "solution pen-injector injection Inject up to 20 units under the skin nightly as needed. Please start with 10 units nightly at first to determine tolerability and glucose needs (Patient taking differently: Inject up to 20 units under the skin nightly as needed. Please start with 10 units nightly at first to determine tolerability and glucose needs) 15 mL 4   • Insulin Pen Needle (TRUEplus Pen Needles) 31G X 8 MM misc Use with insulin injections four times daily. 120 each 11   • Insulin Syringe-Needle U-100 (BD Insulin Syringe U/F) 31G X 5/16\" 0.5 ML misc Use with insulin injections four times daily. 120 each 0   • losartan (COZAAR) 25 MG tablet Take 1 tablet by mouth Daily. 90 tablet 3   • meclizine (ANTIVERT) 12.5 MG tablet Take 1 tablet by mouth 3 (Three) Times a Day As Needed for Dizziness. 90 tablet 3   • meloxicam (MOBIC) 7.5 MG tablet Take 1 tablet by mouth daily **Take with food** 30 tablet 1   • nitroglycerin (NITRODUR) 0.2 MG/HR patch Place 1 patch on the skin as directed by provider Daily at 7AM and remove nightly at 7PM. 30 each 11   • prasugrel (EFFIENT) 10 MG tablet Take 1 tablet by mouth Daily. 90 tablet 2   • ranolazine (RANEXA) 1000 MG 12 hr tablet Take 1 tablet by mouth 2 (two) times a day. 180 tablet 2   • spironolactone (ALDACTONE) 25 MG tablet Take 1 tablet by mouth Daily. 30 tablet 12   • vitamin D (ERGOCALCIFEROL) 1.25 MG (64731 UT) capsule capsule Take 1 capsule by mouth Every 7 (Seven) Days. 4 capsule 11   • glucose blood test strip 1 each by Other route As Needed. Use as instructed       No current facility-administered medications for this visit.     Current Outpatient Medications on File Prior to Visit   Medication Sig   • aspirin 81 MG EC tablet Take 324 mg by mouth every night at bedtime. Pt take 4-81 mg tablet at bedtime.   • Calcium Carbonate-Vit D-Min (Caltrate 600+D Plus Minerals) 600-800 MG-UNIT tablet Take 1 capsule by mouth 2 (Two) Times a Day With Meals.   • carvedilol (Coreg) " "12.5 MG tablet Take 1 tablet by mouth two times daily.   • carvedilol CR (COREG CR) 20 MG 24 hr capsule Take 1 capsule by mouth Daily.   • Continuous Blood Gluc Sensor (Dexcom G6 Sensor) Use as directed for continuous glucose monitoring. Change sensors Every 10 (Ten) Days.   • Continuous Blood Gluc Transmit (Dexcom G6 Transmitter) misc Use as directed for continuous glucose monitoring.  Change transmitter Every 3 (Three) Months.   • Cyanocobalamin (B-12) 500 MCG sublingual tablet 500 mcgs under the tongue every other day (Patient taking differently: Daily. 500 mcgs under the tongue daily)   • Empagliflozin-metFORMIN HCl ER (Synjardy XR) 5-1000 MG tablet sustained-release 24 hour Take 2 tablets by mouth Daily With Breakfast.   • fenofibrate (TRICOR) 145 MG tablet Take 1 tablet by mouth Daily.   • icosapent ethyl (Vascepa) 1 g capsule capsule Take 2 capsules by mouth 2 (Two) Times a Day With Meals.   • Insulin Aspart, w/Niacinamide, 100 UNIT/ML solution pen-injector Inject up to 20 units under the skin into the appropriate area as directed with meals   • insulin degludec (TRESIBA FLEXTOUCH) 100 UNIT/ML solution pen-injector injection Inject up to 20 units under the skin nightly as needed. Please start with 10 units nightly at first to determine tolerability and glucose needs (Patient taking differently: Inject up to 20 units under the skin nightly as needed. Please start with 10 units nightly at first to determine tolerability and glucose needs)   • Insulin Pen Needle (TRUEplus Pen Needles) 31G X 8 MM misc Use with insulin injections four times daily.   • Insulin Syringe-Needle U-100 (BD Insulin Syringe U/F) 31G X 5/16\" 0.5 ML misc Use with insulin injections four times daily.   • losartan (COZAAR) 25 MG tablet Take 1 tablet by mouth Daily.   • meclizine (ANTIVERT) 12.5 MG tablet Take 1 tablet by mouth 3 (Three) Times a Day As Needed for Dizziness.   • meloxicam (MOBIC) 7.5 MG tablet Take 1 tablet by mouth daily " **Take with food**   • nitroglycerin (NITRODUR) 0.2 MG/HR patch Place 1 patch on the skin as directed by provider Daily at 7AM and remove nightly at 7PM.   • prasugrel (EFFIENT) 10 MG tablet Take 1 tablet by mouth Daily.   • ranolazine (RANEXA) 1000 MG 12 hr tablet Take 1 tablet by mouth 2 (two) times a day.   • spironolactone (ALDACTONE) 25 MG tablet Take 1 tablet by mouth Daily.   • vitamin D (ERGOCALCIFEROL) 1.25 MG (14478 UT) capsule capsule Take 1 capsule by mouth Every 7 (Seven) Days.   • glucose blood test strip 1 each by Other route As Needed. Use as instructed     No current facility-administered medications on file prior to visit.     She is allergic to chloromycetin [chloramphenicol], iodinated diagnostic agents, and levaquin [levofloxacin]..    Compared to one year ago, the patient feels her physical   health is better.    Compared to one year ago, the patient feels her mental   health is the same.    Recent Hospitalizations:  This patient has had a [facility] admission record on file within the last 365 days.    Current Medical Providers:  Patient Care Team:  Yanet Robert APRN as PCP - General (Nurse Practitioner)    Outpatient Medications Prior to Visit   Medication Sig Dispense Refill   • aspirin 81 MG EC tablet Take 324 mg by mouth every night at bedtime. Pt take 4-81 mg tablet at bedtime.     • Calcium Carbonate-Vit D-Min (Caltrate 600+D Plus Minerals) 600-800 MG-UNIT tablet Take 1 capsule by mouth 2 (Two) Times a Day With Meals. 180 tablet 3   • carvedilol (Coreg) 12.5 MG tablet Take 1 tablet by mouth two times daily. 180 tablet 3   • carvedilol CR (COREG CR) 20 MG 24 hr capsule Take 1 capsule by mouth Daily. 30 capsule 11   • Continuous Blood Gluc Sensor (Dexcom G6 Sensor) Use as directed for continuous glucose monitoring. Change sensors Every 10 (Ten) Days. 3 each 11   • Continuous Blood Gluc Transmit (Dexcom G6 Transmitter) misc Use as directed for continuous glucose monitoring.  Change  "transmitter Every 3 (Three) Months. 1 each 4   • Cyanocobalamin (B-12) 500 MCG sublingual tablet 500 mcgs under the tongue every other day (Patient taking differently: Daily. 500 mcgs under the tongue daily) 15 tablet 11   • Empagliflozin-metFORMIN HCl ER (Synjardy XR) 5-1000 MG tablet sustained-release 24 hour Take 2 tablets by mouth Daily With Breakfast. 60 tablet 11   • fenofibrate (TRICOR) 145 MG tablet Take 1 tablet by mouth Daily. 90 tablet 3   • icosapent ethyl (Vascepa) 1 g capsule capsule Take 2 capsules by mouth 2 (Two) Times a Day With Meals. 120 capsule 11   • Insulin Aspart, w/Niacinamide, 100 UNIT/ML solution pen-injector Inject up to 20 units under the skin into the appropriate area as directed with meals 15 mL 11   • insulin degludec (TRESIBA FLEXTOUCH) 100 UNIT/ML solution pen-injector injection Inject up to 20 units under the skin nightly as needed. Please start with 10 units nightly at first to determine tolerability and glucose needs (Patient taking differently: Inject up to 20 units under the skin nightly as needed. Please start with 10 units nightly at first to determine tolerability and glucose needs) 15 mL 4   • Insulin Pen Needle (TRUEplus Pen Needles) 31G X 8 MM misc Use with insulin injections four times daily. 120 each 11   • Insulin Syringe-Needle U-100 (BD Insulin Syringe U/F) 31G X 5/16\" 0.5 ML misc Use with insulin injections four times daily. 120 each 0   • losartan (COZAAR) 25 MG tablet Take 1 tablet by mouth Daily. 90 tablet 3   • meclizine (ANTIVERT) 12.5 MG tablet Take 1 tablet by mouth 3 (Three) Times a Day As Needed for Dizziness. 90 tablet 3   • meloxicam (MOBIC) 7.5 MG tablet Take 1 tablet by mouth daily **Take with food** 30 tablet 1   • nitroglycerin (NITRODUR) 0.2 MG/HR patch Place 1 patch on the skin as directed by provider Daily at 7AM and remove nightly at 7PM. 30 each 11   • prasugrel (EFFIENT) 10 MG tablet Take 1 tablet by mouth Daily. 90 tablet 2   • ranolazine " (RANEXA) 1000 MG 12 hr tablet Take 1 tablet by mouth 2 (two) times a day. 180 tablet 2   • spironolactone (ALDACTONE) 25 MG tablet Take 1 tablet by mouth Daily. 30 tablet 12   • vitamin D (ERGOCALCIFEROL) 1.25 MG (84960 UT) capsule capsule Take 1 capsule by mouth Every 7 (Seven) Days. 4 capsule 11   • glucose blood test strip 1 each by Other route As Needed. Use as instructed       No facility-administered medications prior to visit.       No opioid medication identified on active medication list. I have reviewed chart for other potential  high risk medication/s and harmful drug interactions in the elderly.          Aspirin is on active medication list. Aspirin use is indicated based on review of current medical condition/s. Pros and cons of this therapy have been discussed today. Benefits of this medication outweigh potential harm.  Patient has been encouraged to continue taking this medication.  .      Patient Active Problem List   Diagnosis   • Vitamin D deficiency   • Thyroid nodule   • Hypertension   • Hyperlipidemia   • H/O Hashimoto thyroiditis   • GERD (gastroesophageal reflux disease)   • Diabetes mellitus (HCC)   • Coronary arteriosclerosis   • Mild concentric left ventricular hypertrophy (LVH)   • Coronary artery disease involving native coronary artery of native heart   • Hypertension associated with diabetes (HCC)   • Mixed diabetic hyperlipidemia associated with type 2 diabetes mellitus (HCC)   • B12 deficiency   • Meralgia paraesthetica   • Type 2 diabetes mellitus with diabetic cardiomyopathy (HCC)   • Meralgia paraesthetica, left   • Pelvic pain   • Other forms of chronic ischemic heart disease   • Diabetic ketoacidosis without coma associated with type 2 diabetes mellitus (HCC)   • Abdominal pain   • Nausea and vomiting     Advance Care Planning  Advance Directive is not on file.  ACP discussion was held with the patient during this visit. Patient does not have an advance directive, declines  "further assistance.    Review of Systems   Constitutional: Negative.    HENT: Negative.    Respiratory: Negative.    Cardiovascular: Negative.    Gastrointestinal: Negative.    Genitourinary: Negative.    Musculoskeletal: Negative.    Skin: Negative.    Neurological: Negative.    Hematological: Negative.    Psychiatric/Behavioral: Negative.         Objective    Vitals:    11/15/22 1056   BP: 124/78   BP Location: Left arm   Patient Position: Sitting   Cuff Size: Adult   Pulse: 87   SpO2: 96%   Weight: 74.4 kg (164 lb)   Height: 160 cm (63\")     Estimated body mass index is 29.05 kg/m² as calculated from the following:    Height as of this encounter: 160 cm (63\").    Weight as of this encounter: 74.4 kg (164 lb).    BMI is >= 25 and <30. (Overweight) The following options were offered after discussion;: exercise counseling/recommendations and nutrition counseling/recommendations      Does the patient have evidence of cognitive impairment? No    Physical Exam  Vitals and nursing note reviewed.   Constitutional:       Appearance: Normal appearance. She is well-developed. She is obese.   HENT:      Head: Normocephalic and atraumatic.      Right Ear: Tympanic membrane, ear canal and external ear normal.      Left Ear: Tympanic membrane, ear canal and external ear normal.      Nose: Nose normal.      Mouth/Throat:      Mouth: Mucous membranes are moist.      Pharynx: Oropharynx is clear.   Eyes:      Extraocular Movements: Extraocular movements intact.      Conjunctiva/sclera: Conjunctivae normal.      Pupils: Pupils are equal, round, and reactive to light.   Cardiovascular:      Rate and Rhythm: Normal rate and regular rhythm.      Pulses: Normal pulses.      Heart sounds: Normal heart sounds.   Pulmonary:      Effort: Pulmonary effort is normal.      Breath sounds: Normal breath sounds.   Abdominal:      General: Bowel sounds are normal.      Palpations: Abdomen is soft.   Musculoskeletal:         General: Normal range " of motion.      Cervical back: Normal range of motion and neck supple.   Feet:      Right foot:      Protective Sensation: 10 sites tested. 10 sites sensed.      Skin integrity: Dry skin present.      Toenail Condition: Right toenails are normal.      Left foot:      Protective Sensation: 10 sites tested. 10 sites sensed.      Skin integrity: Dry skin present.      Toenail Condition: Left toenails are normal.      Comments: Diabetic foot exam:   Left: Filament test present   Pulses Dorsalis Pedis:  present   Reflexes 3+    Vibratory sensation normal   Proprioception normal   Sharp/dull discrimination normal       Right: Filament test present   Pulses Dorsalis Pedis:  present   Reflexes 3+    Vibratory sensation normal   Proprioception normal   Sharp/dull discrimination normal    Skin:     General: Skin is warm.      Capillary Refill: Capillary refill takes less than 2 seconds.   Neurological:      Mental Status: She is alert and oriented to person, place, and time.   Psychiatric:         Behavior: Behavior normal.       Lab Results   Component Value Date    TRIG 252 (H) 08/22/2022    HDL 39 (L) 08/22/2022    LDL 36 08/22/2022    VLDL 39 08/22/2022    HGBA1C 8.40 (H) 08/22/2022            HEALTH RISK ASSESSMENT    Smoking Status:  Social History     Tobacco Use   Smoking Status Former   • Types: Cigarettes   Smokeless Tobacco Never   Tobacco Comments    smoked for 5 years     Alcohol Consumption:  Social History     Substance and Sexual Activity   Alcohol Use No     Fall Risk Screen:    SMOOTH Fall Risk Assessment was completed, and patient is at LOW risk for falls.Assessment completed on:11/15/2022    Depression Screening:  PHQ-2/PHQ-9 Depression Screening 11/15/2022   Retired PHQ-9 Total Score -   Retired Total Score -   Little Interest or Pleasure in Doing Things 0-->not at all   Feeling Down, Depressed or Hopeless 0-->not at all   PHQ-9: Brief Depression Severity Measure Score 0       Health Habits and Functional  and Cognitive Screening:  No flowsheet data found.    Age-appropriate Screening Schedule:  Refer to the list below for future screening recommendations based on patient's age, sex and/or medical conditions. Orders for these recommended tests are listed in the plan section. The patient has been provided with a written plan.    Health Maintenance   Topic Date Due   • DIABETIC EYE EXAM  03/01/2023 (Originally 10/9/2018)   • HEMOGLOBIN A1C  02/22/2023   • LIPID PANEL  08/22/2023   • URINE MICROALBUMIN  08/22/2023   • DIABETIC FOOT EXAM  11/15/2023   • MAMMOGRAM  06/13/2024   • DXA SCAN  06/13/2024   • TDAP/TD VACCINES (5 - Td or Tdap) 03/29/2028   • INFLUENZA VACCINE  Completed   • ZOSTER VACCINE  Completed              Assessment & Plan   CMS Preventative Services Quick Reference  Risk Factors Identified During Encounter  Cardiovascular Disease  Obesity/Overweight   The above risks/problems have been discussed with the patient.  Follow up actions/plans if indicated are seen below in the Assessment/Plan Section.  Pertinent information has been shared with the patient in the After Visit Summary.    Diagnoses and all orders for this visit:    1. Medicare annual wellness visit, subsequent (Primary)    2. Primary hypertension    3. Pure hypercholesterolemia    4. Type 2 diabetes mellitus with diabetic cardiomyopathy (HCC)    5. Need for pneumococcal vaccination  -     Pneumococcal Conjugate Vaccine 20-Valent (PCV20)        Follow Up:   Return in about 1 year (around 11/15/2023) for Medicare Wellness.     An After Visit Summary and PPPS were made available to the patient.                   This document has been electronically signed by MERT Escobar on November 15, 2022 11:36 CST

## 2022-11-15 NOTE — PATIENT INSTRUCTIONS
Medicare Wellness  Personal Prevention Plan of Service     Date of Office Visit:    Encounter Provider:  MERT Escobar  Place of Service:  [facility] [place]  Patient Name: Bonnie Garcia  :  1955    As part of the Medicare Wellness portion of your visit today, we are providing you with this personalized preventive plan of services (PPPS). This plan is based upon recommendations of the [place] Preventive Services Task Force (USPSTF) and the Advisory Committee on Immunization Practices (ACIP).    This lists the preventive care services that should be considered, and provides dates of when you are due. Items listed as completed are up-to-date and do not require any further intervention.    Health Maintenance   Topic Date Due    DIABETIC EYE EXAM  2023 (Originally 10/9/2018)    COVID-19 Vaccine (4 - Booster for Pfizer series) 2023 (Originally 2021)    HEMOGLOBIN A1C  2023    LIPID PANEL  2023    URINE MICROALBUMIN  2023    ANNUAL WELLNESS VISIT  11/15/2023    DIABETIC FOOT EXAM  11/15/2023    MAMMOGRAM  2024    DXA SCAN  2024    TDAP/TD VACCINES (5 - Td or Tdap) 2028    COLORECTAL CANCER SCREENING  2028    HEPATITIS C SCREENING  Completed    INFLUENZA VACCINE  Completed    Pneumococcal Vaccine 65+  Completed    ZOSTER VACCINE  Completed       Orders Placed This Encounter   Procedures    Pneumococcal Conjugate Vaccine 20-Valent (PCV20)       Return in about 1 year (around 11/15/2023) for Medicare Wellness.

## 2022-11-30 RX ORDER — EVOLOCUMAB 140 MG/ML
INJECTION, SOLUTION SUBCUTANEOUS
Qty: 2 ML | Refills: 3 | Status: SHIPPED | OUTPATIENT
Start: 2022-11-30 | End: 2023-03-23

## 2022-12-21 ENCOUNTER — TELEPHONE (OUTPATIENT)
Dept: ENDOCRINOLOGY | Facility: CLINIC | Age: 67
End: 2022-12-21

## 2022-12-30 ENCOUNTER — OFFICE VISIT (OUTPATIENT)
Dept: ENDOCRINOLOGY | Facility: CLINIC | Age: 67
End: 2022-12-30

## 2022-12-30 ENCOUNTER — LAB (OUTPATIENT)
Dept: LAB | Facility: HOSPITAL | Age: 67
End: 2022-12-30
Payer: MEDICARE

## 2022-12-30 VITALS
SYSTOLIC BLOOD PRESSURE: 130 MMHG | WEIGHT: 162 LBS | HEIGHT: 63 IN | DIASTOLIC BLOOD PRESSURE: 60 MMHG | OXYGEN SATURATION: 97 % | BODY MASS INDEX: 28.7 KG/M2 | HEART RATE: 75 BPM

## 2022-12-30 DIAGNOSIS — E78.2 MIXED DIABETIC HYPERLIPIDEMIA ASSOCIATED WITH TYPE 2 DIABETES MELLITUS: ICD-10-CM

## 2022-12-30 DIAGNOSIS — E53.8 B12 DEFICIENCY: ICD-10-CM

## 2022-12-30 DIAGNOSIS — E11.59 HYPERTENSION ASSOCIATED WITH DIABETES: ICD-10-CM

## 2022-12-30 DIAGNOSIS — E11.69 TYPE 2 DIABETES MELLITUS WITH DIABETIC CARDIOMYOPATHY: ICD-10-CM

## 2022-12-30 DIAGNOSIS — E55.9 VITAMIN D DEFICIENCY: ICD-10-CM

## 2022-12-30 DIAGNOSIS — E11.69 TYPE 2 DIABETES MELLITUS WITH DIABETIC CARDIOMYOPATHY: Primary | ICD-10-CM

## 2022-12-30 DIAGNOSIS — I15.2 HYPERTENSION ASSOCIATED WITH DIABETES: ICD-10-CM

## 2022-12-30 DIAGNOSIS — R80.9 GRADE A2 ALBUMINURIA: ICD-10-CM

## 2022-12-30 DIAGNOSIS — I43 TYPE 2 DIABETES MELLITUS WITH DIABETIC CARDIOMYOPATHY: ICD-10-CM

## 2022-12-30 DIAGNOSIS — E11.69 MIXED DIABETIC HYPERLIPIDEMIA ASSOCIATED WITH TYPE 2 DIABETES MELLITUS: ICD-10-CM

## 2022-12-30 DIAGNOSIS — I43 TYPE 2 DIABETES MELLITUS WITH DIABETIC CARDIOMYOPATHY: Primary | ICD-10-CM

## 2022-12-30 LAB
ALBUMIN SERPL-MCNC: 4.4 G/DL (ref 3.5–5.2)
ALBUMIN UR-MCNC: 1.2 MG/DL
ALBUMIN/GLOB SERPL: 1.9 G/DL
ALP SERPL-CCNC: 97 U/L (ref 39–117)
ALT SERPL W P-5'-P-CCNC: 13 U/L (ref 1–33)
ANION GAP SERPL CALCULATED.3IONS-SCNC: 11 MMOL/L (ref 5–15)
AST SERPL-CCNC: 13 U/L (ref 1–32)
BASOPHILS # BLD AUTO: 0.07 10*3/MM3 (ref 0–0.2)
BASOPHILS NFR BLD AUTO: 1.2 % (ref 0–1.5)
BILIRUB SERPL-MCNC: 0.6 MG/DL (ref 0–1.2)
BUN SERPL-MCNC: 14 MG/DL (ref 8–23)
BUN/CREAT SERPL: 20.6 (ref 7–25)
CALCIUM SPEC-SCNC: 9.7 MG/DL (ref 8.6–10.5)
CHLORIDE SERPL-SCNC: 104 MMOL/L (ref 98–107)
CHOLEST SERPL-MCNC: 116 MG/DL (ref 0–200)
CO2 SERPL-SCNC: 27 MMOL/L (ref 22–29)
CREAT SERPL-MCNC: 0.68 MG/DL (ref 0.57–1)
CREAT UR-MCNC: 37.5 MG/DL
DEPRECATED RDW RBC AUTO: 43.7 FL (ref 37–54)
EGFRCR SERPLBLD CKD-EPI 2021: 96.2 ML/MIN/1.73
EOSINOPHIL # BLD AUTO: 0.15 10*3/MM3 (ref 0–0.4)
EOSINOPHIL NFR BLD AUTO: 2.7 % (ref 0.3–6.2)
ERYTHROCYTE [DISTWIDTH] IN BLOOD BY AUTOMATED COUNT: 13.4 % (ref 12.3–15.4)
GLOBULIN UR ELPH-MCNC: 2.3 GM/DL
GLUCOSE SERPL-MCNC: 229 MG/DL (ref 65–99)
HBA1C MFR BLD: 7.8 % (ref 4.8–5.6)
HCT VFR BLD AUTO: 43.3 % (ref 34–46.6)
HDLC SERPL-MCNC: 42 MG/DL (ref 40–60)
HGB BLD-MCNC: 14.3 G/DL (ref 12–15.9)
IMM GRANULOCYTES # BLD AUTO: 0.05 10*3/MM3 (ref 0–0.05)
IMM GRANULOCYTES NFR BLD AUTO: 0.9 % (ref 0–0.5)
LDLC SERPL CALC-MCNC: 29 MG/DL (ref 0–100)
LDLC/HDLC SERPL: 0.3 {RATIO}
LYMPHOCYTES # BLD AUTO: 1.98 10*3/MM3 (ref 0.7–3.1)
LYMPHOCYTES NFR BLD AUTO: 35.2 % (ref 19.6–45.3)
MCH RBC QN AUTO: 29.9 PG (ref 26.6–33)
MCHC RBC AUTO-ENTMCNC: 33 G/DL (ref 31.5–35.7)
MCV RBC AUTO: 90.4 FL (ref 79–97)
MICROALBUMIN/CREAT UR: 32 MG/G
MONOCYTES # BLD AUTO: 0.43 10*3/MM3 (ref 0.1–0.9)
MONOCYTES NFR BLD AUTO: 7.7 % (ref 5–12)
NEUTROPHILS NFR BLD AUTO: 2.94 10*3/MM3 (ref 1.7–7)
NEUTROPHILS NFR BLD AUTO: 52.3 % (ref 42.7–76)
NRBC BLD AUTO-RTO: 0 /100 WBC (ref 0–0.2)
PLATELET # BLD AUTO: 261 10*3/MM3 (ref 140–450)
PMV BLD AUTO: 10.5 FL (ref 6–12)
POTASSIUM SERPL-SCNC: 4.9 MMOL/L (ref 3.5–5.2)
PROT SERPL-MCNC: 6.7 G/DL (ref 6–8.5)
RBC # BLD AUTO: 4.79 10*6/MM3 (ref 3.77–5.28)
SODIUM SERPL-SCNC: 142 MMOL/L (ref 136–145)
TRIGL SERPL-MCNC: 307 MG/DL (ref 0–150)
TSH SERPL DL<=0.05 MIU/L-ACNC: 2.43 UIU/ML (ref 0.27–4.2)
VIT B12 BLD-MCNC: 674 PG/ML (ref 211–946)
VLDLC SERPL-MCNC: 45 MG/DL (ref 5–40)
WBC NRBC COR # BLD: 5.62 10*3/MM3 (ref 3.4–10.8)

## 2022-12-30 PROCEDURE — 82043 UR ALBUMIN QUANTITATIVE: CPT

## 2022-12-30 PROCEDURE — 84443 ASSAY THYROID STIM HORMONE: CPT

## 2022-12-30 PROCEDURE — 83036 HEMOGLOBIN GLYCOSYLATED A1C: CPT

## 2022-12-30 PROCEDURE — 80053 COMPREHEN METABOLIC PANEL: CPT

## 2022-12-30 PROCEDURE — 80061 LIPID PANEL: CPT

## 2022-12-30 PROCEDURE — 82607 VITAMIN B-12: CPT

## 2022-12-30 PROCEDURE — 85025 COMPLETE CBC W/AUTO DIFF WBC: CPT

## 2022-12-30 PROCEDURE — 95251 CONT GLUC MNTR ANALYSIS I&R: CPT | Performed by: INTERNAL MEDICINE

## 2022-12-30 PROCEDURE — 36415 COLL VENOUS BLD VENIPUNCTURE: CPT

## 2022-12-30 PROCEDURE — 99214 OFFICE O/P EST MOD 30 MIN: CPT | Performed by: INTERNAL MEDICINE

## 2022-12-30 PROCEDURE — 82570 ASSAY OF URINE CREATININE: CPT

## 2022-12-30 NOTE — PROGRESS NOTES
" Bonnie Garcia is a 67 y.o. female who presents for  evaluation of   Chief Complaint   Patient presents with   • Diabetes     T2       HPI      Duration/Timing:    Age at onset of diabetes: 58 years      Severity (Complications/Hospitalizations)  Secondary Macrovascular Complications:  CAD, No CVA, No PAD  has 50 to 50% LAD     PTCI , stent x 5    2 in proximal LAD    1 in circumflex    may need heart surgery   Secondary Microvascular Complications:  No Diabetic Nephropathy, No Diabetic Retinopathy, No Diabetic Neuropathy    Context  Diabetes Regimen:  Oral Medications     Associated Signs/Symptoms  None at this time     Developed DKA in the setting of UTI, dehydration and ongoing sglt2 I     =============      Also h o thyroid nodule  location - right  timing constant  not progressive  severity - low -  bx , Sept 2008 - benign   biochemically euthyroid , off synthroid but used to be on 25 mcgs daily     thyroid US personally reviewed    Sept 2008    1.8 x 1.7 x 1.4 , right , solid , heterogeneous, hypoechoic.     Repeat US June 2013    r solid nodule stable in size, echogenicity consistent w Hashimoto's     PE    /60   Pulse 75   Ht 160 cm (63\")   Wt 73.5 kg (162 lb)   SpO2 97%   BMI 28.70 kg/m²   AOx3  No goiter  Normal Respiratory Effort , Lung CTA  RRR  No Edema    Labs    Lab Results   Component Value Date    WBC 5.62 12/30/2022    HGB 14.3 12/30/2022    HCT 43.3 12/30/2022    MCV 90.4 12/30/2022     12/30/2022     Lab Results   Component Value Date    GLUCOSE 229 (H) 12/30/2022    BUN 14 12/30/2022    CREATININE 0.68 12/30/2022    EGFRIFNONA 133 11/15/2021    BCR 20.6 12/30/2022    K 4.9 12/30/2022    CO2 27.0 12/30/2022    CALCIUM 9.7 12/30/2022    ALBUMIN 4.4 12/30/2022    AST 13 12/30/2022    ALT 13 12/30/2022           Assessment & Plan       ICD-10-CM ICD-9-CM   1. Type 2 diabetes mellitus with diabetic cardiomyopathy (HCC)  E11.69 250.80    I43 425.7   2. Hypertension associated " with diabetes (HCC)  E11.59 250.80    I15.2 401.9   3. Mixed diabetic hyperlipidemia associated with type 2 diabetes mellitus (HCC)  E11.69 250.80    E78.2 272.2   4. B12 deficiency  E53.8 266.2         Glycemic Management:      Lab Results   Component Value Date    HGBA1C 7.80 (H) 12/30/2022    HGBA1C 8.40 (H) 08/22/2022    HGBA1C 7.90 (H) 05/12/2022     Lab Results   Component Value Date    MICROALBUR 1.4 08/22/2022    CREATININE 0.68 12/30/2022       Januvia 100 mg daily  Stop   Vomited on 3 mg of rybelsus     --     synjardy     --    She will sign up for the senior saving program    --    Tresiba 20 units -22   Self adjust for target of fasting 80 to 130 , increase by 2 units every 4 days but don't exceed 34 units     Fiasp   Sliding scale of 2 per 50     2 UNITS PER 15 GRAMS     dexcom , 2 week review, scanned in     Time in Target 5%  No lows  Clear post meal rise , nlization overnight  Conclusion , type 2 diabetes with hyperglycemia     Lipid Management  Lab Results   Component Value Date    CHOL 116 12/30/2022    CHOL 114 08/22/2022    CHOL 273 (H) 05/12/2022     Lab Results   Component Value Date    TRIG 307 (H) 12/30/2022    TRIG 252 (H) 08/22/2022    TRIG 185 (H) 05/12/2022     Lab Results   Component Value Date    HDL 42 12/30/2022    HDL 39 (L) 08/22/2022    HDL 44 05/12/2022       Lab Results   Component Value Date    LDL 29 12/30/2022    LDL 36 08/22/2022     (H) 05/12/2022     The ASCVD Risk score (Rukhsana DK, et al., 2019) failed to calculate for the following reasons:    The valid total cholesterol range is 130 to 320 mg/dL       On fenofibrate   on vascepa ( intolerant to lovaza )    Tried  crestor 5 mg but developed myalgias. Not able to tolerate even every other day   has tried lipitor, zocor, livalo, fluvastatin since 2010 and retried in 2017 and 2018  but hasn't been able to tolerate due to myalgias     Presently only on zetia 10 mg daily - no longer needed     On repatha and effective  "       Blood Pressure Management:    /60   Pulse 75   Ht 160 cm (63\")   Wt 73.5 kg (162 lb)   SpO2 97%   BMI 28.70 kg/m²     on coreg 12.5 bid   losartan 25 mg   was on benicar ,  off ACEi due to cough    edema left > r , use compression stockings    No longer a problem   Microvascular Complication Monitoring:  No Microalbuminuria, Date of last Microalbumin Assessment 10/08/2015, No Diabetic Retinopathy, No Diabetic     +  neuropathy   Meralgia paresthetica  Start neurontin 300 mg tid had to back off to 100 mg po bid - can't tolerate , doing better    Lab Results   Component Value Date    MALBCRERATIO 34.8 08/22/2022    MALBCRERATIO 23.8 05/12/2022     Moderate albuminuria   Prefers no more meds  She is on aldactone         Weight Changes     Add contrave to lifestyle changes - felt she was going to die   intolerant to rybelsus 3 mg     Bone Health  Vit D 50 th weekly and levels are nl   Other Diabetes Related Aspects  Right Thyroid nodule      R nodule , dimensions in HPI , stable from 2008 to 2013     she has Hashimoto's proven on US and + TPO ab, euthyroid June 2013  used to be on Lt4 at 25 mcgs but d/c years ago  follow TSH - nl     Lab Results   Component Value Date    TSH 2.430 12/30/2022         --    10-15  nl B12 but low normal, start b12     having some unsteadiness and numbness    Lab Results   Component Value Date    [id number] 1,601 (H) 08/22/2022     Please take in addition to MVI    No orders of the defined types were placed in this encounter.          This document has been electronically signed by Victorino Leyva MD on December 30, 2022 10:13 CST      Orders Placed This Encounter   Procedures   • CBC Auto Differential     Standing Status:   Future     Standing Expiration Date:   12/30/2023     Order Specific Question:   Release to patient     Answer:   Routine Release   • Comprehensive Metabolic Panel     Standing Status:   Future     Standing Expiration Date:   12/30/2023 "     Order Specific Question:   Release to patient     Answer:   Routine Release   • Hemoglobin A1c     Standing Status:   Future     Standing Expiration Date:   12/30/2023     Order Specific Question:   Release to patient     Answer:   Routine Release   • Lipid Panel     Standing Status:   Future     Standing Expiration Date:   12/30/2023   • Microalbumin / Creatinine Urine Ratio - Urine, Clean Catch     Standing Status:   Future     Standing Expiration Date:   12/30/2023     Order Specific Question:   Release to patient     Answer:   Routine Release   • TSH     Standing Status:   Future     Standing Expiration Date:   12/30/2023     Order Specific Question:   Release to patient     Answer:   Routine Release   • Vitamin B12     Standing Status:   Future     Standing Expiration Date:   12/30/2023     Order Specific Question:   Release to patient     Answer:   Routine Release

## 2023-03-02 ENCOUNTER — DOCUMENTATION (OUTPATIENT)
Dept: ENDOCRINOLOGY | Facility: CLINIC | Age: 68
End: 2023-03-02
Payer: COMMERCIAL

## 2023-03-10 ENCOUNTER — LAB (OUTPATIENT)
Dept: LAB | Facility: HOSPITAL | Age: 68
End: 2023-03-10
Payer: MEDICARE

## 2023-03-10 DIAGNOSIS — I15.2 HYPERTENSION ASSOCIATED WITH DIABETES: ICD-10-CM

## 2023-03-10 DIAGNOSIS — E11.59 HYPERTENSION ASSOCIATED WITH DIABETES: ICD-10-CM

## 2023-03-10 DIAGNOSIS — E53.8 B12 DEFICIENCY: ICD-10-CM

## 2023-03-10 DIAGNOSIS — E78.2 MIXED DIABETIC HYPERLIPIDEMIA ASSOCIATED WITH TYPE 2 DIABETES MELLITUS: ICD-10-CM

## 2023-03-10 DIAGNOSIS — E11.69 TYPE 2 DIABETES MELLITUS WITH DIABETIC CARDIOMYOPATHY: ICD-10-CM

## 2023-03-10 DIAGNOSIS — I43 TYPE 2 DIABETES MELLITUS WITH DIABETIC CARDIOMYOPATHY: ICD-10-CM

## 2023-03-10 DIAGNOSIS — E11.69 MIXED DIABETIC HYPERLIPIDEMIA ASSOCIATED WITH TYPE 2 DIABETES MELLITUS: ICD-10-CM

## 2023-03-10 LAB
ALBUMIN SERPL-MCNC: 4.2 G/DL (ref 3.5–5.2)
ALBUMIN UR-MCNC: 1.3 MG/DL
ALBUMIN/GLOB SERPL: 1.8 G/DL
ALP SERPL-CCNC: 89 U/L (ref 39–117)
ALT SERPL W P-5'-P-CCNC: 11 U/L (ref 1–33)
ANION GAP SERPL CALCULATED.3IONS-SCNC: 10 MMOL/L (ref 5–15)
AST SERPL-CCNC: 22 U/L (ref 1–32)
BASOPHILS # BLD AUTO: 0.07 10*3/MM3 (ref 0–0.2)
BASOPHILS NFR BLD AUTO: 1.4 % (ref 0–1.5)
BILIRUB SERPL-MCNC: 0.7 MG/DL (ref 0–1.2)
BUN SERPL-MCNC: 14 MG/DL (ref 8–23)
BUN/CREAT SERPL: 27.5 (ref 7–25)
CALCIUM SPEC-SCNC: 9.5 MG/DL (ref 8.6–10.5)
CHLORIDE SERPL-SCNC: 107 MMOL/L (ref 98–107)
CHOLEST SERPL-MCNC: 112 MG/DL (ref 0–200)
CO2 SERPL-SCNC: 26 MMOL/L (ref 22–29)
CREAT SERPL-MCNC: 0.51 MG/DL (ref 0.57–1)
CREAT UR-MCNC: 38.6 MG/DL
DEPRECATED RDW RBC AUTO: 44.9 FL (ref 37–54)
EGFRCR SERPLBLD CKD-EPI 2021: 103.1 ML/MIN/1.73
EOSINOPHIL # BLD AUTO: 0.18 10*3/MM3 (ref 0–0.4)
EOSINOPHIL NFR BLD AUTO: 3.5 % (ref 0.3–6.2)
ERYTHROCYTE [DISTWIDTH] IN BLOOD BY AUTOMATED COUNT: 13.6 % (ref 12.3–15.4)
GLOBULIN UR ELPH-MCNC: 2.4 GM/DL
GLUCOSE SERPL-MCNC: 179 MG/DL (ref 65–99)
HBA1C MFR BLD: 7.8 % (ref 4.8–5.6)
HCT VFR BLD AUTO: 42.7 % (ref 34–46.6)
HDLC SERPL-MCNC: 41 MG/DL (ref 40–60)
HGB BLD-MCNC: 14.2 G/DL (ref 12–15.9)
IMM GRANULOCYTES # BLD AUTO: 0.04 10*3/MM3 (ref 0–0.05)
IMM GRANULOCYTES NFR BLD AUTO: 0.8 % (ref 0–0.5)
LDLC SERPL CALC-MCNC: 44 MG/DL (ref 0–100)
LDLC/HDLC SERPL: 0.96 {RATIO}
LYMPHOCYTES # BLD AUTO: 1.51 10*3/MM3 (ref 0.7–3.1)
LYMPHOCYTES NFR BLD AUTO: 29.6 % (ref 19.6–45.3)
MCH RBC QN AUTO: 30.5 PG (ref 26.6–33)
MCHC RBC AUTO-ENTMCNC: 33.3 G/DL (ref 31.5–35.7)
MCV RBC AUTO: 91.8 FL (ref 79–97)
MICROALBUMIN/CREAT UR: 33.7 MG/G
MONOCYTES # BLD AUTO: 0.4 10*3/MM3 (ref 0.1–0.9)
MONOCYTES NFR BLD AUTO: 7.8 % (ref 5–12)
NEUTROPHILS NFR BLD AUTO: 2.9 10*3/MM3 (ref 1.7–7)
NEUTROPHILS NFR BLD AUTO: 56.9 % (ref 42.7–76)
NRBC BLD AUTO-RTO: 0 /100 WBC (ref 0–0.2)
PLATELET # BLD AUTO: 259 10*3/MM3 (ref 140–450)
PMV BLD AUTO: 10.7 FL (ref 6–12)
POTASSIUM SERPL-SCNC: 4.7 MMOL/L (ref 3.5–5.2)
PROT SERPL-MCNC: 6.6 G/DL (ref 6–8.5)
RBC # BLD AUTO: 4.65 10*6/MM3 (ref 3.77–5.28)
SODIUM SERPL-SCNC: 143 MMOL/L (ref 136–145)
TRIGL SERPL-MCNC: 159 MG/DL (ref 0–150)
TSH SERPL DL<=0.05 MIU/L-ACNC: 1.98 UIU/ML (ref 0.27–4.2)
VLDLC SERPL-MCNC: 27 MG/DL (ref 5–40)
WBC NRBC COR # BLD: 5.1 10*3/MM3 (ref 3.4–10.8)

## 2023-03-10 PROCEDURE — 80053 COMPREHEN METABOLIC PANEL: CPT

## 2023-03-10 PROCEDURE — 84443 ASSAY THYROID STIM HORMONE: CPT

## 2023-03-10 PROCEDURE — 82607 VITAMIN B-12: CPT

## 2023-03-10 PROCEDURE — 82570 ASSAY OF URINE CREATININE: CPT

## 2023-03-10 PROCEDURE — 82043 UR ALBUMIN QUANTITATIVE: CPT

## 2023-03-10 PROCEDURE — 36415 COLL VENOUS BLD VENIPUNCTURE: CPT

## 2023-03-10 PROCEDURE — 80061 LIPID PANEL: CPT

## 2023-03-10 PROCEDURE — 85025 COMPLETE CBC W/AUTO DIFF WBC: CPT

## 2023-03-10 PROCEDURE — 83036 HEMOGLOBIN GLYCOSYLATED A1C: CPT

## 2023-03-11 LAB — VIT B12 BLD-MCNC: 508 PG/ML (ref 211–946)

## 2023-03-13 ENCOUNTER — OFFICE VISIT (OUTPATIENT)
Dept: ENDOCRINOLOGY | Facility: CLINIC | Age: 68
End: 2023-03-13
Payer: MEDICARE

## 2023-03-13 VITALS
BODY MASS INDEX: 29.23 KG/M2 | HEIGHT: 63 IN | DIASTOLIC BLOOD PRESSURE: 60 MMHG | WEIGHT: 165 LBS | HEART RATE: 78 BPM | OXYGEN SATURATION: 96 % | SYSTOLIC BLOOD PRESSURE: 110 MMHG

## 2023-03-13 DIAGNOSIS — E55.9 VITAMIN D DEFICIENCY: ICD-10-CM

## 2023-03-13 DIAGNOSIS — R80.9 GRADE A2 ALBUMINURIA: ICD-10-CM

## 2023-03-13 DIAGNOSIS — E78.2 MIXED DIABETIC HYPERLIPIDEMIA ASSOCIATED WITH TYPE 2 DIABETES MELLITUS: ICD-10-CM

## 2023-03-13 DIAGNOSIS — E11.69 TYPE 2 DIABETES MELLITUS WITH DIABETIC CARDIOMYOPATHY: Primary | ICD-10-CM

## 2023-03-13 DIAGNOSIS — E65 LIPOHYPERTROPHY: ICD-10-CM

## 2023-03-13 DIAGNOSIS — I15.2 HYPERTENSION ASSOCIATED WITH DIABETES: ICD-10-CM

## 2023-03-13 DIAGNOSIS — E11.59 HYPERTENSION ASSOCIATED WITH DIABETES: ICD-10-CM

## 2023-03-13 DIAGNOSIS — G57.12 MERALGIA PARAESTHETICA, LEFT: ICD-10-CM

## 2023-03-13 DIAGNOSIS — E53.8 B12 DEFICIENCY: ICD-10-CM

## 2023-03-13 DIAGNOSIS — I43 TYPE 2 DIABETES MELLITUS WITH DIABETIC CARDIOMYOPATHY: Primary | ICD-10-CM

## 2023-03-13 DIAGNOSIS — E11.69 MIXED DIABETIC HYPERLIPIDEMIA ASSOCIATED WITH TYPE 2 DIABETES MELLITUS: ICD-10-CM

## 2023-03-13 PROCEDURE — 99214 OFFICE O/P EST MOD 30 MIN: CPT | Performed by: INTERNAL MEDICINE

## 2023-03-13 RX ORDER — INSULIN PMP CART,AUT,G6/7,CNTR
1 EACH SUBCUTANEOUS
Qty: 10 EACH | Refills: 11 | Status: SHIPPED | OUTPATIENT
Start: 2023-03-13

## 2023-03-13 RX ORDER — INSULIN PMP CART,AUT,G6/7,CNTR
1 EACH SUBCUTANEOUS ONCE
Qty: 1 KIT | Refills: 1 | Status: SHIPPED | OUTPATIENT
Start: 2023-03-13 | End: 2023-03-13

## 2023-03-13 NOTE — PROGRESS NOTES
" Bonnie Garcia is a 67 y.o. female who presents for  evaluation of   Chief Complaint   Patient presents with   • Diabetes     T2       HPI      Duration/Timing:    Age at onset of diabetes: 58 years      Severity (Complications/Hospitalizations)  Secondary Macrovascular Complications:  CAD, No CVA, No PAD  has 50 to 50% LAD     PTCI , stent x 5    2 in proximal LAD    1 in circumflex    may need heart surgery     Secondary Microvascular Complications:  No Diabetic Nephropathy, No Diabetic Retinopathy ( cataracts ), No Diabetic Neuropathy    Context  Diabetes Regimen:  Oral Medications     Associated Signs/Symptoms  None at this time     Developed DKA in the setting of UTI, dehydration and ongoing sglt2 I     =============      Also h o thyroid nodule  location - right  timing constant  not progressive  severity - low -  bx , Sept 2008 - benign   biochemically euthyroid , off synthroid but used to be on 25 mcgs daily     thyroid US personally reviewed    Sept 2008    1.8 x 1.7 x 1.4 , right , solid , heterogeneous, hypoechoic.     Repeat US June 2013    r solid nodule stable in size, echogenicity consistent w Hashimoto's     PE    /60   Pulse 78   Ht 160 cm (63\")   Wt 74.8 kg (165 lb)   SpO2 96%   BMI 29.23 kg/m²   AOx3  No goiter  Normal Respiratory Effort , Lung CTA  RRR  No Edema    Labs    Lab Results   Component Value Date    WBC 5.10 03/10/2023    HGB 14.2 03/10/2023    HCT 42.7 03/10/2023    MCV 91.8 03/10/2023     03/10/2023     Lab Results   Component Value Date    GLUCOSE 179 (H) 03/10/2023    BUN 14 03/10/2023    CREATININE 0.51 (L) 03/10/2023    EGFRIFNONA 133 11/15/2021    BCR 27.5 (H) 03/10/2023    K 4.7 03/10/2023    CO2 26.0 03/10/2023    CALCIUM 9.5 03/10/2023    ALBUMIN 4.2 03/10/2023    AST 22 03/10/2023    ALT 11 03/10/2023           Assessment & Plan       ICD-10-CM ICD-9-CM   1. Type 2 diabetes mellitus with diabetic cardiomyopathy (HCC)  E11.69 250.80    I43 425.7   2. " Hypertension associated with diabetes (HCC)  E11.59 250.80    I15.2 401.9   3. Mixed diabetic hyperlipidemia associated with type 2 diabetes mellitus (HCC)  E11.69 250.80    E78.2 272.2   4. B12 deficiency  E53.8 266.2   5. Vitamin D deficiency  E55.9 268.9   6. Grade A2 albuminuria  R80.9 791.0   7. Lipohypertrophy  E65 278.1   8. Meralgia paraesthetica, left  G57.12 355.1         Glycemic Management:      Lab Results   Component Value Date    HGBA1C 7.80 (H) 03/10/2023    HGBA1C 7.80 (H) 12/30/2022    HGBA1C 8.40 (H) 08/22/2022     Lab Results   Component Value Date    MICROALBUR 1.3 03/10/2023    CREATININE 0.51 (L) 03/10/2023       Januvia 100 mg daily  Stop   Vomited on 3 mg of rybelsus     --     synjardy     --  --    Tresiba 20 units -22 -26 - increase to 30  Self adjust for target of fasting 80 to 130 , increase by 2 units every 4 days but don't exceed 36 units     Fiasp   Sliding scale of 2 per 50     2 UNITS PER 15 GRAMS - increase to 3 units per 15 grams     dexcom , 2 week review, scanned in w data finalizing 03/13/23       Time in Target 5% lat time , now 18%   No lows      Conclusion , type 2 diabetes with hyperglycemia     GMI,more than 9%  Report sent for scanning      Approve for omnipod 5       Lipid Management  Lab Results   Component Value Date    CHOL 112 03/10/2023    CHOL 116 12/30/2022    CHOL 114 08/22/2022     Lab Results   Component Value Date    TRIG 159 (H) 03/10/2023    TRIG 307 (H) 12/30/2022    TRIG 252 (H) 08/22/2022     Lab Results   Component Value Date    HDL 41 03/10/2023    HDL 42 12/30/2022    HDL 39 (L) 08/22/2022       Lab Results   Component Value Date    LDL 44 03/10/2023    LDL 29 12/30/2022    LDL 36 08/22/2022     The ASCVD Risk score (Rukhsana DARLING, et al., 2019) failed to calculate for the following reasons:    The valid total cholesterol range is 130 to 320 mg/dL       On fenofibrate and repatha     This appt stop fish oil since vascepa not paid for  Intolerant to all  "statins     Blood Pressure Management:    /60   Pulse 78   Ht 160 cm (63\")   Wt 74.8 kg (165 lb)   SpO2 96%   BMI 29.23 kg/m²     on coreg 12.5 bid   losartan 25 mg   was on benicar ,  off ACEi due to cough    edema left > r , use compression stockings    No longer a problem     +  neuropathy   Meralgia paresthetica  Start neurontin 300 mg tid had to back off to 100 mg po bid - can't tolerate , doing better    Lab Results   Component Value Date    MALBCRERATIO 33.7 03/10/2023    MALBCRERATIO 32.0 2022     Moderate albuminuria   She is on aldactone , synjardy and benicar         Weight Changes     Add contrave to lifestyle changes - felt she was going to die   intolerant to rybelsus 3 mg     Bone Health  Vit D 50 th weekly and levels are nl   Other Diabetes Related Aspects  Right Thyroid nodule      R nodule , dimensions in HPI , stable from  to      she has Hashimoto's proven on US and + TPO ab, euthyroid 2013  used to be on Lt4 at 25 mcgs but d/c years ago  follow TSH - nl     Lab Results   Component Value Date    TSH 1.980 03/10/2023         --    10-15  nl B12 but low normal, start b12     having some unsteadiness and numbness    Lab Results   Component Value Date    [id number] 508 03/10/2023     Please take in addition to MVI    New Medications Ordered This Visit   Medications   • Insulin Aspart, w/Niacinamide, 100 UNIT/ML solution pen-injector     Sig: Inject up to 20 units under the skin into the appropriate area as directed with meals     Dispense:  15 mL     Refill:  11   • insulin degludec (TRESIBA FLEXTOUCH) 100 UNIT/ML solution pen-injector injection     Sig: Up to 60 units at night     Dispense:  15 mL     Refill:  11   • Insulin Disposable Pump (Omnipod 5 G6 Intro, Gen 5,) kit     Si kit 1 (One) Time for 1 dose. Change pods every 72 hours.     Dispense:  1 kit     Refill:  1   • Insulin Disposable Pump (Omnipod 5 G6 Pod, Gen 5,) misc     Si each Every 72 " (Seventy-Two) Hours.     Dispense:  10 each     Refill:  11       Orders Placed This Encounter   Procedures   • CBC Auto Differential   • Comprehensive Metabolic Panel   • Hemoglobin A1c   • Lipid Panel   • Microalbumin / Creatinine Urine Ratio - Urine, Clean Catch   • TSH   • Vitamin B12             This document has been electronically signed by Victorino Leyva MD on March 13, 2023 21:06 CDT

## 2023-03-14 ENCOUNTER — DOCUMENTATION (OUTPATIENT)
Dept: ENDOCRINOLOGY | Facility: CLINIC | Age: 68
End: 2023-03-14
Payer: COMMERCIAL

## 2023-03-15 ENCOUNTER — DOCUMENTATION (OUTPATIENT)
Dept: ENDOCRINOLOGY | Facility: CLINIC | Age: 68
End: 2023-03-15
Payer: COMMERCIAL

## 2023-03-16 ENCOUNTER — DOCUMENTATION (OUTPATIENT)
Dept: ENDOCRINOLOGY | Facility: CLINIC | Age: 68
End: 2023-03-16
Payer: COMMERCIAL

## 2023-03-23 RX ORDER — EVOLOCUMAB 140 MG/ML
INJECTION, SOLUTION SUBCUTANEOUS
Qty: 2 ML | Refills: 3 | Status: SHIPPED | OUTPATIENT
Start: 2023-03-23

## 2023-05-01 RX ORDER — PEN NEEDLE, DIABETIC 31 GX5/16"
NEEDLE, DISPOSABLE MISCELLANEOUS
Qty: 100 EACH | Refills: 3 | Status: SHIPPED | OUTPATIENT
Start: 2023-05-01

## 2023-05-04 RX ORDER — ICOSAPENT ETHYL 1000 MG/1
CAPSULE ORAL
Qty: 120 CAPSULE | Refills: 1 | Status: SHIPPED | OUTPATIENT
Start: 2023-05-04

## 2023-05-30 ENCOUNTER — LAB (OUTPATIENT)
Dept: LAB | Facility: HOSPITAL | Age: 68
End: 2023-05-30

## 2023-05-30 DIAGNOSIS — I15.2 HYPERTENSION ASSOCIATED WITH DIABETES: ICD-10-CM

## 2023-05-30 DIAGNOSIS — E11.69 TYPE 2 DIABETES MELLITUS WITH DIABETIC CARDIOMYOPATHY: ICD-10-CM

## 2023-05-30 DIAGNOSIS — E53.8 B12 DEFICIENCY: ICD-10-CM

## 2023-05-30 DIAGNOSIS — E11.69 MIXED DIABETIC HYPERLIPIDEMIA ASSOCIATED WITH TYPE 2 DIABETES MELLITUS: ICD-10-CM

## 2023-05-30 DIAGNOSIS — E65 LIPOHYPERTROPHY: ICD-10-CM

## 2023-05-30 DIAGNOSIS — E55.9 VITAMIN D DEFICIENCY: ICD-10-CM

## 2023-05-30 DIAGNOSIS — R80.9 GRADE A2 ALBUMINURIA: ICD-10-CM

## 2023-05-30 DIAGNOSIS — G57.12 MERALGIA PARAESTHETICA, LEFT: ICD-10-CM

## 2023-05-30 DIAGNOSIS — E11.59 HYPERTENSION ASSOCIATED WITH DIABETES: ICD-10-CM

## 2023-05-30 DIAGNOSIS — I43 TYPE 2 DIABETES MELLITUS WITH DIABETIC CARDIOMYOPATHY: ICD-10-CM

## 2023-05-30 DIAGNOSIS — E78.2 MIXED DIABETIC HYPERLIPIDEMIA ASSOCIATED WITH TYPE 2 DIABETES MELLITUS: ICD-10-CM

## 2023-05-30 LAB
ALBUMIN SERPL-MCNC: 4.1 G/DL (ref 3.5–5.2)
ALBUMIN/GLOB SERPL: 1.6 G/DL
ALP SERPL-CCNC: 90 U/L (ref 39–117)
ALT SERPL W P-5'-P-CCNC: 9 U/L (ref 1–33)
ANION GAP SERPL CALCULATED.3IONS-SCNC: 12 MMOL/L (ref 5–15)
AST SERPL-CCNC: 13 U/L (ref 1–32)
BASOPHILS # BLD AUTO: 0.07 10*3/MM3 (ref 0–0.2)
BASOPHILS NFR BLD AUTO: 1.4 % (ref 0–1.5)
BILIRUB SERPL-MCNC: 0.6 MG/DL (ref 0–1.2)
BUN SERPL-MCNC: 15 MG/DL (ref 8–23)
BUN/CREAT SERPL: 24.6 (ref 7–25)
CALCIUM SPEC-SCNC: 9.6 MG/DL (ref 8.6–10.5)
CHLORIDE SERPL-SCNC: 102 MMOL/L (ref 98–107)
CHOLEST SERPL-MCNC: 230 MG/DL (ref 0–200)
CO2 SERPL-SCNC: 23 MMOL/L (ref 22–29)
CREAT SERPL-MCNC: 0.61 MG/DL (ref 0.57–1)
DEPRECATED RDW RBC AUTO: 42.9 FL (ref 37–54)
EGFRCR SERPLBLD CKD-EPI 2021: 98.1 ML/MIN/1.73
EOSINOPHIL # BLD AUTO: 0.17 10*3/MM3 (ref 0–0.4)
EOSINOPHIL NFR BLD AUTO: 3.4 % (ref 0.3–6.2)
ERYTHROCYTE [DISTWIDTH] IN BLOOD BY AUTOMATED COUNT: 13.1 % (ref 12.3–15.4)
GLOBULIN UR ELPH-MCNC: 2.5 GM/DL
GLUCOSE SERPL-MCNC: 250 MG/DL (ref 65–99)
HBA1C MFR BLD: 7.8 % (ref 4.8–5.6)
HCT VFR BLD AUTO: 40 % (ref 34–46.6)
HDLC SERPL-MCNC: 27 MG/DL (ref 40–60)
HGB BLD-MCNC: 13.7 G/DL (ref 12–15.9)
IMM GRANULOCYTES # BLD AUTO: 0.04 10*3/MM3 (ref 0–0.05)
IMM GRANULOCYTES NFR BLD AUTO: 0.8 % (ref 0–0.5)
LDLC SERPL CALC-MCNC: 89 MG/DL (ref 0–100)
LDLC/HDLC SERPL: 2.39 {RATIO}
LYMPHOCYTES # BLD AUTO: 1.39 10*3/MM3 (ref 0.7–3.1)
LYMPHOCYTES NFR BLD AUTO: 27.8 % (ref 19.6–45.3)
MCH RBC QN AUTO: 31.1 PG (ref 26.6–33)
MCHC RBC AUTO-ENTMCNC: 34.3 G/DL (ref 31.5–35.7)
MCV RBC AUTO: 90.9 FL (ref 79–97)
MONOCYTES # BLD AUTO: 0.44 10*3/MM3 (ref 0.1–0.9)
MONOCYTES NFR BLD AUTO: 8.8 % (ref 5–12)
NEUTROPHILS NFR BLD AUTO: 2.89 10*3/MM3 (ref 1.7–7)
NEUTROPHILS NFR BLD AUTO: 57.8 % (ref 42.7–76)
NRBC BLD AUTO-RTO: 0 /100 WBC (ref 0–0.2)
PLATELET # BLD AUTO: 263 10*3/MM3 (ref 140–450)
PMV BLD AUTO: 11.2 FL (ref 6–12)
POTASSIUM SERPL-SCNC: 5 MMOL/L (ref 3.5–5.2)
PROT SERPL-MCNC: 6.6 G/DL (ref 6–8.5)
RBC # BLD AUTO: 4.4 10*6/MM3 (ref 3.77–5.28)
SODIUM SERPL-SCNC: 137 MMOL/L (ref 136–145)
TRIGL SERPL-MCNC: 693 MG/DL (ref 0–150)
TSH SERPL DL<=0.05 MIU/L-ACNC: 2.64 UIU/ML (ref 0.27–4.2)
VLDLC SERPL-MCNC: 114 MG/DL (ref 5–40)
WBC NRBC COR # BLD: 5 10*3/MM3 (ref 3.4–10.8)

## 2023-05-30 PROCEDURE — 82570 ASSAY OF URINE CREATININE: CPT

## 2023-05-30 PROCEDURE — 84443 ASSAY THYROID STIM HORMONE: CPT

## 2023-05-30 PROCEDURE — 80061 LIPID PANEL: CPT

## 2023-05-30 PROCEDURE — 85025 COMPLETE CBC W/AUTO DIFF WBC: CPT

## 2023-05-30 PROCEDURE — 36415 COLL VENOUS BLD VENIPUNCTURE: CPT

## 2023-05-30 PROCEDURE — 80053 COMPREHEN METABOLIC PANEL: CPT

## 2023-05-30 PROCEDURE — 82043 UR ALBUMIN QUANTITATIVE: CPT

## 2023-05-30 PROCEDURE — 83036 HEMOGLOBIN GLYCOSYLATED A1C: CPT

## 2023-05-30 PROCEDURE — 82607 VITAMIN B-12: CPT

## 2023-05-31 LAB
ALBUMIN UR-MCNC: <1.2 MG/DL
CREAT UR-MCNC: 32.6 MG/DL
MICROALBUMIN/CREAT UR: NORMAL MG/G{CREAT}
VIT B12 BLD-MCNC: 610 PG/ML (ref 211–946)

## 2023-05-31 RX ORDER — EMPAGLIFLOZIN, METFORMIN HYDROCHLORIDE 5; 1000 MG/1; MG/1
2 TABLET, EXTENDED RELEASE ORAL
Qty: 60 TABLET | Refills: 11 | Status: SHIPPED | OUTPATIENT
Start: 2023-05-31

## 2023-06-02 ENCOUNTER — OFFICE VISIT (OUTPATIENT)
Dept: ENDOCRINOLOGY | Facility: CLINIC | Age: 68
End: 2023-06-02

## 2023-06-02 VITALS
HEIGHT: 63 IN | BODY MASS INDEX: 29.77 KG/M2 | WEIGHT: 168 LBS | SYSTOLIC BLOOD PRESSURE: 110 MMHG | OXYGEN SATURATION: 97 % | DIASTOLIC BLOOD PRESSURE: 70 MMHG | HEART RATE: 77 BPM

## 2023-06-02 DIAGNOSIS — E11.59 HYPERTENSION ASSOCIATED WITH DIABETES: ICD-10-CM

## 2023-06-02 DIAGNOSIS — E11.69 TYPE 2 DIABETES MELLITUS WITH DIABETIC CARDIOMYOPATHY: Primary | ICD-10-CM

## 2023-06-02 DIAGNOSIS — E78.2 MIXED DIABETIC HYPERLIPIDEMIA ASSOCIATED WITH TYPE 2 DIABETES MELLITUS: ICD-10-CM

## 2023-06-02 DIAGNOSIS — E11.69 MIXED DIABETIC HYPERLIPIDEMIA ASSOCIATED WITH TYPE 2 DIABETES MELLITUS: ICD-10-CM

## 2023-06-02 DIAGNOSIS — I15.2 HYPERTENSION ASSOCIATED WITH DIABETES: ICD-10-CM

## 2023-06-02 DIAGNOSIS — R80.9 GRADE A2 ALBUMINURIA: ICD-10-CM

## 2023-06-02 DIAGNOSIS — I43 TYPE 2 DIABETES MELLITUS WITH DIABETIC CARDIOMYOPATHY: Primary | ICD-10-CM

## 2023-06-02 NOTE — PROGRESS NOTES
" Bonnie Garcia is a 68 y.o. female who presents for  evaluation of   Chief Complaint   Patient presents with   • Diabetes     T2       HPI      Duration/Timing:    Age at onset of diabetes: 58 years      Severity (Complications/Hospitalizations)  Secondary Macrovascular Complications:  CAD, No CVA, No PAD  has 50 to 50% LAD     PTCI , stent x 5    2 in proximal LAD    1 in circumflex    may need heart surgery     Secondary Microvascular Complications:  No Diabetic Nephropathy, No Diabetic Retinopathy ( cataracts ), No Diabetic Neuropathy    Context  Diabetes Regimen:  Oral Medications     Associated Signs/Symptoms  None at this time     Developed DKA in the setting of UTI, dehydration and ongoing sglt2 I     =============      Also h o thyroid nodule  location - right  timing constant  not progressive  severity - low -  bx , Sept 2008 - benign   biochemically euthyroid , off synthroid but used to be on 25 mcgs daily     thyroid US personally reviewed    Sept 2008    1.8 x 1.7 x 1.4 , right , solid , heterogeneous, hypoechoic.     Repeat US June 2013    r solid nodule stable in size, echogenicity consistent w Hashimoto's     PE    /70   Pulse 77   Ht 160 cm (63\")   Wt 76.2 kg (168 lb)   SpO2 97%   BMI 29.76 kg/m²   AOx3  No goiter  Normal Respiratory Effort , Lung CTA  RRR  No Edema    Labs    Lab Results   Component Value Date    WBC 5.00 05/30/2023    HGB 13.7 05/30/2023    HCT 40.0 05/30/2023    MCV 90.9 05/30/2023     05/30/2023     Lab Results   Component Value Date    GLUCOSE 250 (H) 05/30/2023    BUN 15 05/30/2023    CREATININE 0.61 05/30/2023    EGFRIFNONA 133 11/15/2021    BCR 24.6 05/30/2023    K 5.0 05/30/2023    CO2 23.0 05/30/2023    CALCIUM 9.6 05/30/2023    ALBUMIN 4.1 05/30/2023    AST 13 05/30/2023    ALT 9 05/30/2023           Assessment & Plan       ICD-10-CM ICD-9-CM   1. Type 2 diabetes mellitus with diabetic cardiomyopathy  E11.69 250.80    I43 425.7   2. Hypertension " associated with diabetes  E11.59 250.80    I15.2 401.9   3. Mixed diabetic hyperlipidemia associated with type 2 diabetes mellitus  E11.69 250.80    E78.2 272.2   4. Grade A2 albuminuria  R80.9 791.0         Glycemic Management:      Lab Results   Component Value Date    HGBA1C 7.80 (H) 05/30/2023    HGBA1C 7.80 (H) 03/10/2023    HGBA1C 7.80 (H) 12/30/2022     Lab Results   Component Value Date    MICROALBUR <1.2 05/30/2023    CREATININE 0.61 05/30/2023       Januvia 100 mg daily  Stop   Vomited on 3 mg of rybelsus     --     synjardy     --  --    Tresiba 20 units -22 -26 - increase to 30  Self adjust for target of fasting 80 to 130 , increase by 2 units every 4 days but don't exceed 36 units     Fiasp   Sliding scale of 2 per 50     2 UNITS PER 15 GRAMS - increase to 3 units per 15 grams     dexcom , 2 week review, scanned in w data finalizing 06/02/23       Time in Target 5% lat time , now 18% - 14%  No lows      Conclusion , type 2 diabetes with hyperglycemia     GMI,more than 9%  Report sent for scanning      Approved for omnipod 5 pending to start    I suggest , basal of 1 unit per hour     Carb ratio of 5     Correction 25       Lipid Management  Lab Results   Component Value Date    CHOL 230 (H) 05/30/2023    CHOL 112 03/10/2023    CHOL 116 12/30/2022     Lab Results   Component Value Date    TRIG 693 (H) 05/30/2023    TRIG 159 (H) 03/10/2023    TRIG 307 (H) 12/30/2022     Lab Results   Component Value Date    HDL 27 (L) 05/30/2023    HDL 41 03/10/2023    HDL 42 12/30/2022       Lab Results   Component Value Date    LDL 89 05/30/2023    LDL 44 03/10/2023    LDL 29 12/30/2022     The 10-year ASCVD risk score (Rukhsana DARLING, et al., 2019) is: 17%    Values used to calculate the score:      Age: 68 years      Sex: Female      Is Non- : No      Diabetic: Yes      Tobacco smoker: No      Systolic Blood Pressure: 110 mmHg      Is BP treated: Yes      HDL Cholesterol: 27 mg/dL      Total  "Cholesterol: 230 mg/dL       On fenofibrate and repatha   And vascepa   Using omnipod will help     Blood Pressure Management:    /70   Pulse 77   Ht 160 cm (63\")   Wt 76.2 kg (168 lb)   SpO2 97%   BMI 29.76 kg/m²     on coreg 12.5 bid   losartan 25 mg   was on benicar ,  off ACEi due to cough    edema left > r , use compression stockings    No longer a problem     +  neuropathy   Meralgia paresthetica  Start neurontin 300 mg tid had to back off to 100 mg po bid - can't tolerate , doing better    Lab Results   Component Value Date    MALBCRERATIO  05/30/2023      Comment:      Unable to calculate    MALBCRERATIO 33.7 03/10/2023     Moderate albuminuria   She is on aldactone , synjardy and benicar         Weight Changes     Add contrave to lifestyle changes - felt she was going to die   intolerant to rybelsus 3 mg     Bone Health  Vit D 50 th weekly and levels are nl   Other Diabetes Related Aspects  Right Thyroid nodule      R nodule , dimensions in HPI , stable from 2008 to 2013     she has Hashimoto's proven on US and + TPO ab, euthyroid June 2013  used to be on Lt4 at 25 mcgs but d/c years ago  follow TSH - nl     Lab Results   Component Value Date    TSH 2.640 05/30/2023         --    10-15  nl B12 but low normal, start b12     having some unsteadiness and numbness    Lab Results   Component Value Date    [id number] 610 05/30/2023     Please take in addition to MVI    No orders of the defined types were placed in this encounter.      Orders Placed This Encounter   Procedures   • CBC Auto Differential   • Comprehensive Metabolic Panel   • Hemoglobin A1c   • Lipid Panel   • Microalbumin / Creatinine Urine Ratio - Urine, Clean Catch   • TSH   • Vitamin B12             This document has been electronically signed by Victorino Leyva MD on June 2, 2023 10:27 CDT          "

## 2023-06-06 ENCOUNTER — TELEPHONE (OUTPATIENT)
Dept: ENDOCRINOLOGY | Facility: CLINIC | Age: 68
End: 2023-06-06
Payer: COMMERCIAL

## 2023-06-06 NOTE — TELEPHONE ENCOUNTER
Pt. Called and stated that her insurance will pay for part of the diabetic kit but the Pt. Will have to pay $600 dollars worth for this. The Pt. Cannot afford this and would like to know what she can do.       You can call back at [phone]

## 2023-06-07 ENCOUNTER — TELEPHONE (OUTPATIENT)
Dept: ENDOCRINOLOGY | Facility: CLINIC | Age: 68
End: 2023-06-07
Payer: COMMERCIAL

## 2023-06-07 NOTE — TELEPHONE ENCOUNTER
YINI -- pt called requesting to leave a message for Dr Sanchez letting him know that the pt has ordered the omni pod 5 intro kit.    Thanks

## 2023-07-25 ENCOUNTER — DOCUMENTATION (OUTPATIENT)
Dept: ENDOCRINOLOGY | Facility: CLINIC | Age: 68
End: 2023-07-25
Payer: COMMERCIAL

## 2023-07-28 ENCOUNTER — DOCUMENTATION (OUTPATIENT)
Dept: ENDOCRINOLOGY | Facility: CLINIC | Age: 68
End: 2023-07-28
Payer: COMMERCIAL

## 2023-08-10 PROCEDURE — 87635 SARS-COV-2 COVID-19 AMP PRB: CPT | Performed by: NURSE PRACTITIONER

## 2023-08-21 ENCOUNTER — DOCUMENTATION (OUTPATIENT)
Dept: ENDOCRINOLOGY | Facility: CLINIC | Age: 68
End: 2023-08-21
Payer: COMMERCIAL

## 2023-08-21 DIAGNOSIS — R89.2 DRUG TOXICITY: ICD-10-CM

## 2023-08-21 DIAGNOSIS — I10 HYPERTENSION, ESSENTIAL: Primary | ICD-10-CM

## 2023-08-24 ENCOUNTER — PREP FOR SURGERY (OUTPATIENT)
Dept: OTHER | Facility: HOSPITAL | Age: 68
End: 2023-08-24
Payer: COMMERCIAL

## 2023-08-24 DIAGNOSIS — I25.110 CORONARY ARTERY DISEASE INVOLVING NATIVE HEART WITH UNSTABLE ANGINA PECTORIS, UNSPECIFIED VESSEL OR LESION TYPE: Primary | ICD-10-CM

## 2023-08-24 RX ORDER — SODIUM CHLORIDE 9 MG/ML
40 INJECTION, SOLUTION INTRAVENOUS AS NEEDED
Status: CANCELLED | OUTPATIENT
Start: 2023-08-25

## 2023-08-24 RX ORDER — SODIUM CHLORIDE 0.9 % (FLUSH) 0.9 %
3 SYRINGE (ML) INJECTION EVERY 12 HOURS SCHEDULED
Status: CANCELLED | OUTPATIENT
Start: 2023-08-25

## 2023-08-24 RX ORDER — SODIUM CHLORIDE 0.9 % (FLUSH) 0.9 %
10 SYRINGE (ML) INJECTION AS NEEDED
Status: CANCELLED | OUTPATIENT
Start: 2023-08-25

## 2023-08-25 ENCOUNTER — HOSPITAL ENCOUNTER (OUTPATIENT)
Facility: HOSPITAL | Age: 68
Setting detail: HOSPITAL OUTPATIENT SURGERY
Discharge: HOME | End: 2023-08-25
Attending: INTERNAL MEDICINE | Admitting: INTERNAL MEDICINE
Payer: MEDICARE

## 2023-08-25 VITALS
DIASTOLIC BLOOD PRESSURE: 62 MMHG | WEIGHT: 165.34 LBS | RESPIRATION RATE: 18 BRPM | BODY MASS INDEX: 29.3 KG/M2 | TEMPERATURE: 96.4 F | HEIGHT: 63 IN | OXYGEN SATURATION: 94 % | SYSTOLIC BLOOD PRESSURE: 132 MMHG | HEART RATE: 82 BPM

## 2023-08-25 DIAGNOSIS — I10 HYPERTENSION, ESSENTIAL: ICD-10-CM

## 2023-08-25 DIAGNOSIS — I25.110 CORONARY ARTERY DISEASE INVOLVING NATIVE HEART WITH UNSTABLE ANGINA PECTORIS, UNSPECIFIED VESSEL OR LESION TYPE: ICD-10-CM

## 2023-08-25 DIAGNOSIS — R89.2 DRUG TOXICITY: ICD-10-CM

## 2023-08-25 LAB
ALBUMIN SERPL-MCNC: 4.2 G/DL (ref 3.5–5.2)
ALBUMIN/GLOB SERPL: 1.4 G/DL
ALP SERPL-CCNC: 94 U/L (ref 39–117)
ALT SERPL W P-5'-P-CCNC: 14 U/L (ref 1–33)
ANION GAP SERPL CALCULATED.3IONS-SCNC: 15 MMOL/L (ref 5–15)
APTT PPP: 23.1 SECONDS (ref 20–40.3)
AST SERPL-CCNC: 12 U/L (ref 1–32)
BASOPHILS # BLD AUTO: 0.05 10*3/MM3 (ref 0–0.2)
BASOPHILS NFR BLD AUTO: 1 % (ref 0–1.5)
BILIRUB SERPL-MCNC: 0.7 MG/DL (ref 0–1.2)
BUN SERPL-MCNC: 12 MG/DL (ref 8–23)
BUN/CREAT SERPL: 20 (ref 7–25)
CALCIUM SPEC-SCNC: 9.4 MG/DL (ref 8.6–10.5)
CHLORIDE SERPL-SCNC: 99 MMOL/L (ref 98–107)
CO2 SERPL-SCNC: 23 MMOL/L (ref 22–29)
CREAT SERPL-MCNC: 0.6 MG/DL (ref 0.57–1)
DEPRECATED RDW RBC AUTO: 42.7 FL (ref 37–54)
EGFRCR SERPLBLD CKD-EPI 2021: 98.5 ML/MIN/1.73
EOSINOPHIL # BLD AUTO: 0.15 10*3/MM3 (ref 0–0.4)
EOSINOPHIL NFR BLD AUTO: 2.9 % (ref 0.3–6.2)
ERYTHROCYTE [DISTWIDTH] IN BLOOD BY AUTOMATED COUNT: 13.2 % (ref 12.3–15.4)
GLOBULIN UR ELPH-MCNC: 3.1 GM/DL
GLUCOSE SERPL-MCNC: 241 MG/DL (ref 65–99)
HCT VFR BLD AUTO: 41.4 % (ref 34–46.6)
HGB BLD-MCNC: 14 G/DL (ref 12–15.9)
IMM GRANULOCYTES # BLD AUTO: 0.02 10*3/MM3 (ref 0–0.05)
IMM GRANULOCYTES NFR BLD AUTO: 0.4 % (ref 0–0.5)
INR PPP: 0.88 (ref 0.8–1.2)
LYMPHOCYTES # BLD AUTO: 1.44 10*3/MM3 (ref 0.7–3.1)
LYMPHOCYTES NFR BLD AUTO: 27.9 % (ref 19.6–45.3)
MCH RBC QN AUTO: 30.6 PG (ref 26.6–33)
MCHC RBC AUTO-ENTMCNC: 33.8 G/DL (ref 31.5–35.7)
MCV RBC AUTO: 90.6 FL (ref 79–97)
MONOCYTES # BLD AUTO: 0.34 10*3/MM3 (ref 0.1–0.9)
MONOCYTES NFR BLD AUTO: 6.6 % (ref 5–12)
NEUTROPHILS NFR BLD AUTO: 3.17 10*3/MM3 (ref 1.7–7)
NEUTROPHILS NFR BLD AUTO: 61.2 % (ref 42.7–76)
NRBC BLD AUTO-RTO: 0 /100 WBC (ref 0–0.2)
PLATELET # BLD AUTO: 265 10*3/MM3 (ref 140–450)
PMV BLD AUTO: 10.8 FL (ref 6–12)
POTASSIUM SERPL-SCNC: 3.9 MMOL/L (ref 3.5–5.2)
PROT SERPL-MCNC: 7.3 G/DL (ref 6–8.5)
PROTHROMBIN TIME: 12 SECONDS (ref 11.1–15.3)
RBC # BLD AUTO: 4.57 10*6/MM3 (ref 3.77–5.28)
SODIUM SERPL-SCNC: 137 MMOL/L (ref 136–145)
WBC NRBC COR # BLD: 5.17 10*3/MM3 (ref 3.4–10.8)

## 2023-08-25 PROCEDURE — 25010000002 FENTANYL CITRATE (PF) 50 MCG/ML SOLUTION: Performed by: INTERNAL MEDICINE

## 2023-08-25 PROCEDURE — C1769 GUIDE WIRE: HCPCS | Performed by: INTERNAL MEDICINE

## 2023-08-25 PROCEDURE — 25510000001 IOPAMIDOL PER 1 ML: Performed by: INTERNAL MEDICINE

## 2023-08-25 PROCEDURE — C1760 CLOSURE DEV, VASC: HCPCS | Performed by: INTERNAL MEDICINE

## 2023-08-25 PROCEDURE — 85730 THROMBOPLASTIN TIME PARTIAL: CPT | Performed by: INTERNAL MEDICINE

## 2023-08-25 PROCEDURE — 85610 PROTHROMBIN TIME: CPT | Performed by: INTERNAL MEDICINE

## 2023-08-25 PROCEDURE — 25010000002 DIPHENHYDRAMINE PER 50 MG: Performed by: INTERNAL MEDICINE

## 2023-08-25 PROCEDURE — 25010000002 MIDAZOLAM PER 1 MG: Performed by: INTERNAL MEDICINE

## 2023-08-25 PROCEDURE — C1894 INTRO/SHEATH, NON-LASER: HCPCS | Performed by: INTERNAL MEDICINE

## 2023-08-25 PROCEDURE — 85025 COMPLETE CBC W/AUTO DIFF WBC: CPT | Performed by: INTERNAL MEDICINE

## 2023-08-25 PROCEDURE — 25010000002 METHYLPREDNISOLONE PER 125 MG: Performed by: INTERNAL MEDICINE

## 2023-08-25 PROCEDURE — 93458 L HRT ARTERY/VENTRICLE ANGIO: CPT | Performed by: INTERNAL MEDICINE

## 2023-08-25 PROCEDURE — 80053 COMPREHEN METABOLIC PANEL: CPT | Performed by: INTERNAL MEDICINE

## 2023-08-25 PROCEDURE — 25010000002 HEPARIN (PORCINE) PER 1000 UNITS: Performed by: INTERNAL MEDICINE

## 2023-08-25 RX ORDER — ACETAMINOPHEN 325 MG/1
650 TABLET ORAL EVERY 4 HOURS PRN
Status: DISCONTINUED | OUTPATIENT
Start: 2023-08-25 | End: 2023-08-25 | Stop reason: HOSPADM

## 2023-08-25 RX ORDER — FENTANYL CITRATE 50 UG/ML
INJECTION, SOLUTION INTRAMUSCULAR; INTRAVENOUS
Status: DISCONTINUED | OUTPATIENT
Start: 2023-08-25 | End: 2023-08-25 | Stop reason: HOSPADM

## 2023-08-25 RX ORDER — SODIUM CHLORIDE 0.9 % (FLUSH) 0.9 %
3 SYRINGE (ML) INJECTION EVERY 12 HOURS SCHEDULED
Status: DISCONTINUED | OUTPATIENT
Start: 2023-08-25 | End: 2023-08-25 | Stop reason: HOSPADM

## 2023-08-25 RX ORDER — SODIUM CHLORIDE 9 MG/ML
40 INJECTION, SOLUTION INTRAVENOUS AS NEEDED
Status: DISCONTINUED | OUTPATIENT
Start: 2023-08-25 | End: 2023-08-25 | Stop reason: HOSPADM

## 2023-08-25 RX ORDER — SODIUM CHLORIDE 9 MG/ML
100 INJECTION, SOLUTION INTRAVENOUS CONTINUOUS
Status: DISCONTINUED | OUTPATIENT
Start: 2023-08-25 | End: 2023-08-25 | Stop reason: HOSPADM

## 2023-08-25 RX ORDER — DIPHENHYDRAMINE HYDROCHLORIDE 50 MG/ML
INJECTION INTRAMUSCULAR; INTRAVENOUS
Status: DISCONTINUED | OUTPATIENT
Start: 2023-08-25 | End: 2023-08-25 | Stop reason: HOSPADM

## 2023-08-25 RX ORDER — MIDAZOLAM HYDROCHLORIDE 1 MG/ML
INJECTION INTRAMUSCULAR; INTRAVENOUS
Status: DISCONTINUED | OUTPATIENT
Start: 2023-08-25 | End: 2023-08-25 | Stop reason: HOSPADM

## 2023-08-25 RX ORDER — LIDOCAINE HYDROCHLORIDE 20 MG/ML
INJECTION, SOLUTION INFILTRATION; PERINEURAL
Status: DISCONTINUED | OUTPATIENT
Start: 2023-08-25 | End: 2023-08-25 | Stop reason: HOSPADM

## 2023-08-25 RX ORDER — METHYLPREDNISOLONE SODIUM SUCCINATE 125 MG/2ML
INJECTION, POWDER, LYOPHILIZED, FOR SOLUTION INTRAMUSCULAR; INTRAVENOUS
Status: DISCONTINUED | OUTPATIENT
Start: 2023-08-25 | End: 2023-08-25 | Stop reason: HOSPADM

## 2023-08-25 RX ORDER — SODIUM CHLORIDE 0.9 % (FLUSH) 0.9 %
10 SYRINGE (ML) INJECTION AS NEEDED
Status: DISCONTINUED | OUTPATIENT
Start: 2023-08-25 | End: 2023-08-25 | Stop reason: HOSPADM

## 2023-08-25 RX ORDER — NITROGLYCERIN 0.4 MG/1
0.4 TABLET SUBLINGUAL
Status: DISCONTINUED | OUTPATIENT
Start: 2023-08-25 | End: 2023-08-25 | Stop reason: HOSPADM

## 2023-08-25 NOTE — PROGRESS NOTES
Patient underwent coronary angiogram.  Patient coronary angiogram revealed nondominant right coronary artery which in the proximal and midportion with 90 to 95% stenosis.  Patient proximal circumflex artery had moderate 50 to 60% stenosis.  Patient had sustained patency in the left anterior descending artery with in-stent 50% stenosis.  Patient was recommended medical management.  Patient arterial sheath was pulled and close with an Angio-Seal closure device.    Reviewed the coronary angiogram with Dr. Zuleta for the nondominant right coronary artery which was not a large-caliber vessel.  Patient would not be subjected to percutaneous intervention for the right coronary artery.

## 2023-08-25 NOTE — H&P
Cardiology History and Physical Note        Patient Name: Bonnie Garcia  Age/Sex: 68 y.o. female  : 1955  MRN: [id number]    Date of Admission : 2023    Primary care Physician: Yanet Robert APRN    Reason for Admission: Chest pain unstable angina history of atherosclerotic coronary artery disease      Subjective:       Chief Complaint: Chest pain    History of Present Illness:  Bonnie Garcia is a 68 y.o. female     with a body mass index of 29   with a past medical history significant for atherosclerotic coronary artery disease s/p coronary angiogram done in 2016 which had revealed proximal left anterior descending artery stenosis with subsequent FFR evaluation and PTCA and stenting of the proximal left anterior descending artery with deployment of a 2.75 mm x 12 mm Alpine drug-eluting stent with previous history of PTCA and stenting of the left anterior descending artery with deployment of a 3.5 mm x 18 mm Alpine drug-eluting stent and PTCA and stenting of the distal circumflex artery with deployment of a 2.5 mm x 8 mm Alpine drug-eluting stent, nondominant right coronary artery with 90% stenosis less than 1.5 mm in caliber, previous PTCA and stenting of the proximal left anterior descending artery with deployment of a 3.5 mm x 23 mm and a 3.5 mm x 8 mm Cypher stent and PTCA and stenting of the distal circumflex artery with deployment of a 2 mm x 18 mm mini vision stent done at [facility], arterial hypertension, hypertensive heart disease, hyperlipidemia, nonrheumatic mild mitral and nonrheumatic mild tricuspid regurgitation, non-insulin-dependent diabetes, hyperlipidemia, hypothyroidism, and strong family history for coronary artery disease.    Patient has symptoms of chest pain associated with shortness of breath.  Patient complains of having jaw pain.  Patient symptoms is similar in quality as the one which she had experienced prior to the previous stent placement.    Patient  blood pressure today is 126/70 pulse of 78 respiration of 18 oxygen saturation of 96%.     Patient 10 point review of system except for what stated in the history of present illness and negative.     Concurrent  Medical History:  1.  Chest pain shortness of breath.  2.  Atherosclerotic coronary artery disease.  3.  Last coronary angiogram done in June 2016 which had revealed:  A.  Nondominant right coronary artery with 90% stenosis which was less than 1.5 mm in caliber.  B.  Sustained patency in the circumflex artery site of previous angioplasty and stenting.  C.  Mid left anterior descending artery with 60 to 70% stenosis with positive FFR evaluation with subsequent PTCA and stenting with deployment of a 2.75 mm x 12 mm drug-eluting stent.  4.  Previous PTCA and stenting of the left anterior descending artery with deployment of a 3.5 mm x 18 mm drug-eluting stent after FFR evaluation which had revealed a value of 0.78.  5.  Previous PTCA and stenting of the distal circumflex artery done at [facility] with deployment of a 2 mm x 18 mm mini vision stent done in 2014.  6.  Previous PTCA and stenting of the proximal left anterior descending artery with deployment of a 3.5 mm x 23 mm and a 3.5 mm x 8 mm Cypher drug-eluting stent.  7.  Arterial hypertension.  8.  Hypertensive heart disease.  9.  Concentric left ventricular hypertrophy with an ejection fraction of 60%.  10.  Nonrheumatic mild pulmonic insufficiency and nonrheumatic mild tricuspid and mitral regurgitation.  11.  Hyperlipidemia.  12.  Poorly controlled diabetes.  13.  Gastroesophageal reflux disease.  14.  Vitamin D deficiency.  15.  Thyroid nodule  16.  Chronic back pain.  17.  Episodes of snoring with no previous sleep study evaluation.  18.  Fibrocystic breast disease.           Past Surgical History:  1.   Umbilical hernia repair in 2007.  2.   Laparoscopy.  3.   Tonsillectomy.  4.   Cholecystectomy.  5.   Previous history of vaginal  delivery.  6.  Coronary angiogram and PTCA and stenting.  7.  Colonoscopy.  8.  Kenalog injection.  9.  Tubal ligation.  10.  Esophagoscopy.  11.  Esophagogastroduodenoscopy.        Family History: Family history significant for mother and father who had coronary artery disease.        Social History: Previous history of tobacco abuse no history of alcohol abuse Works as a registered nurse at [employer].        Cardiac Risk factor:   1.  Postmenopausal.  2.  Arterial hypertension.  3.  Hyperlipidemia.  4.  Diabetes.  5.  Family history for coronary artery disease.      Allergies:  Allergies   Allergen Reactions    Chloromycetin [Chloramphenicol] Unknown (See Comments)     Unknown- young    Iodinated Contrast Media Hives     sneezing    Levaquin [Levofloxacin] Rash       Home Medication::  Prior to Admission medications    Medication Sig Start Date End Date Taking? Authorizing Provider   aspirin 81 MG EC tablet Take 4 tablets by mouth every night at bedtime. Pt take 4-81 mg tablet at bedtime.    Provider, MD Mirella   benzonatate (TESSALON) 200 MG capsule Take 1 capsule by mouth 3 (Three) Times a Day As Needed for Cough. 8/10/23   Thi Marks APRN   carvedilol (Coreg) 12.5 MG tablet Take 1 tablet by mouth two times daily. 6/27/22      Continuous Blood Gluc Sensor (Dexcom G6 Sensor) Use as directed for continuous glucose monitoring. Change sensors Every 10 (Ten) Days. 5/24/22   Victorino Smith MD   Continuous Blood Gluc Transmit (Dexcom G6 Transmitter) misc Use as directed for continuous glucose monitoring.  Change transmitter Every 3 (Three) Months. 5/24/22   Victorino Smith MD   fenofibrate (TRICOR) 145 MG tablet Take 1 tablet by mouth Daily. 8/23/21      Insulin Aspart, w/Niacinamide, 100 UNIT/ML solution pen-injector Inject up to 20 units under the skin into the appropriate area as directed with meals 3/13/23   Victorino Smith MD   insulin degludec (TRESIBA  "FLEXTOUCH) 100 UNIT/ML solution pen-injector injection Up to 60 units at night 3/13/23   Victorino Smith MD   Insulin Disposable Pump (Omnipod 5 G6 Pod, Gen 5,) misc 1 each Every 72 (Seventy-Two) Hours. 3/13/23   Victorino Smith MD   Insulin Syringe-Needle U-100 (BD Insulin Syringe U/F) 31G X 5/16\" 0.5 ML misc Use with insulin injections four times daily. 3/30/22   Mike Goodson MD   [employer] 31G 31G X 8 MM misc USE WITH INSULIN INJECTIONS 4 TIMES DAILY 5/1/23   Victorino Smith MD   losartan (COZAAR) 25 MG tablet Take 1 tablet by mouth Daily. 6/24/22      meclizine (ANTIVERT) 12.5 MG tablet Take 1 tablet by mouth 3 (Three) Times a Day As Needed for Dizziness. 11/15/21   Yanet Robert APRN   meloxicam (MOBIC) 7.5 MG tablet Take 1 tablet by mouth daily **Take with food**  Patient taking differently: Take 1 tablet by mouth As Needed. 9/26/22   Yanet Robert APRN   nitroglycerin (NITRODUR) 0.2 MG/HR patch Place 1 patch on the skin as directed by provider Daily at 7AM and remove nightly at 7PM. 2/14/22      prasugrel (EFFIENT) 10 MG tablet Take 1 tablet by mouth Daily. 9/24/21      promethazine-dextromethorphan (PROMETHAZINE-DM) 6.25-15 MG/5ML syrup Take 5 mL by mouth At Night As Needed for Cough. 8/10/23   Thi Marks APRN   ranolazine (RANEXA) 1000 MG 12 hr tablet Take 1 tablet by mouth 2 (two) times a day. 9/27/21      Repatha SureClick solution auto-injector SureClick injection INJECT 1 ML UNDER THE SKIN INTO THE APPRORIATE AREA EVERY 14 DAYS AS DIRECTED 7/12/23   Victorino Smith MD   spironolactone (ALDACTONE) 25 MG tablet Take 1 tablet by mouth Daily.  Patient taking differently: Take 1 tablet by mouth As Needed. 6/22/22      Synjardy XR 5-1000 MG tablet sustained-release 24 hour TAKE 2 TABLETS BY MOUTH DAILY WITH BREAKFAST 5/31/23   Victorino Smith MD   Vascepa 1 g capsule capsule TAKE TWO CAPSULES BY MOUTH TWICE A DAY 7/11/23   Daniel " Victorino Leyva MD   vitamin D (ERGOCALCIFEROL) 1.25 MG (90919 UT) capsule capsule Take 1 capsule by mouth Every 7 (Seven) Days. 9/13/22   Sanchez Victorino Leyva MD         Review of Systems   Constitutional:  Positive for fatigue. Negative for chills, fever and unexpected weight change.   HENT:  Negative for hearing loss and nosebleeds.    Eyes:  Negative for visual disturbance.   Respiratory:  Positive for chest tightness and shortness of breath. Negative for cough and wheezing.    Cardiovascular:  Positive for chest pain. Negative for palpitations and leg swelling.   Gastrointestinal:  Negative for abdominal pain, blood in stool, constipation, diarrhea, nausea and vomiting.   Endocrine: Negative for cold intolerance, heat intolerance, polydipsia, polyphagia and polyuria.   Genitourinary:  Negative for hematuria.   Musculoskeletal:  Negative for joint swelling, myalgias and neck pain.   Skin:  Negative for color change, rash and wound.   Neurological:  Negative for dizziness, seizures, syncope, light-headedness, numbness and headaches.   Hematological:  Does not bruise/bleed easily.       Objective:     LMP  (LMP Unknown)   Blood pressure 126/70 pulse of 78 regular respiration of 18 oxygen   Constitutional:       Appearance: Well-developed.   Eyes:      General: No scleral icterus.     Conjunctiva/sclera: Conjunctivae normal.      Pupils: Pupils are equal, round, and reactive to light.   HENT:      Head: Normocephalic and atraumatic.      Left Ear: External ear normal.      Nose: Nose normal.   Neck:      Thyroid: No thyromegaly.      Vascular: No JVD.      Trachea: No tracheal deviation.      Lymphadenopathy: No cervical adenopathy.   Pulmonary:      Breath sounds: Normal breath sounds. No stridor.   Cardiovascular:      Normal rate. Regular rhythm.   Abdominal:      General: Bowel sounds are normal.   Musculoskeletal: Normal range of motion.      Cervical back: Normal range of motion and neck supple.  Skin:     General: Skin is warm and dry.   Neurological:      Mental Status: Alert and oriented to person, place, and time.      Deep Tendon Reflexes: Reflexes are normal and symmetric.   Psychiatric:         Behavior: Behavior normal.         Thought Content: Thought content normal.         Judgment: Judgment normal.       Lab Review:           Invalid input(s): LABALBU, PROT                                    EKG:   ECG/EMG Results (last 24 hours)       ** No results found for the last 24 hours. **            Imaging:  Imaging Results (Last 24 Hours)       ** No results found for the last 24 hours. **            I personally viewed and interpreted the patient's EKG/Telemetry data.    Assessment:       1.  Chest pain.  2.  Atherosclerotic coronary artery disease.  3.  Previous PTCA and stenting of the left anterior descending artery and distal circumflex artery.  4.  Arterial hypertension.  5.  Hypertensive heart disease.  6.  Hyperlipidemia.  7.  Diabetes.  8.  Vitamin D deficiency      Plan:   1. Chest pain. Patient has history of documented atherosclerotic coronary artery disease with previous PTCA and stenting of the left anterior descending artery and circumflex artery with the last coronary intervention done in 2016. Patient resting electrocardiogram done reveals sinus rhythm with nonspecific ST-T wave changes.  Patient has symptoms of chest pain suggestive of crescendo unstable angina.  Patient has been recommended coronary angiogram.       Patient has documented history of atherosclerotic coronary artery disease with symptoms of substernal chest pain suggestive of crescendo unstable angina pectoris.  Patient has been explained risk-benefit treatment options for a coronary angiogram and an informed consent was obtained from the patient for the coronary angiogram.  Patient's Mallampati score was II, patient's ASA classification was II.  Patient will undergo IV hydration and will check the renal function prior  to the coronary angiogram.    2.  Arterial hypertension.  Patient blood pressure is currently 126/70.  Patient denies any symptoms of headache.  Patient would be continued on the present dose of the losartan 25 mg daily.  Patient is to continue with the present dose of the carvedilol 20 mg daily.     3.  Hypertensive heart disease with nonrheumatic mild mitral and nonrheumatic mild tricuspid regurgitation.  Patient does complain of having symptoms of shortness of breath.  Patient has been recommended to undergo a transthoracic echocardiogram to evaluate the left ventricular systolic function and to rule out regional segmental wall motion abnormality.     4.  Hyperlipidemia.  Patient has been counseled on low-fat low-cholesterol diet.  Patient is currently on Repatha 140 mg every 2 weeks along with Zetia 10 mg daily.  Patient has not been able to tolerate statin.  Patient would be continued on the present dose of the fenofibrate 145 mg daily.     5.  Vitamin D deficiency.  Patient is currently on vitamin D supplement 50,000 units and has been followed by the primary care physician.     6.  Diabetes poorly controlled.  Patient is currently on Janumet.  Patient has been followed by endocrinology.  Patient has been counseled on American diabetic Association diet.     7.  Gastroesophageal reflux disease.  Patient is currently on Prilosec 20 mg daily.     8.  Episodes of snoring.  Patient has been recommended to undergo sleep study to rule out obstructive sleep apnea.     The above plan of management were discussed with the patient.      Time: time spent in face-to-face evaluation of greater than 55 minutes and interacting and formulating examining and discussing the plan with the patient with 50% of greater time spent in face-to-face interaction.    Electronically signed by Dk Colon MD, 08/25/23, 12:37 AM CDT.    Dictated utilizing Dragon dictation.

## 2023-08-25 NOTE — Clinical Note
Hemostasis started on the right femoral artery. Angio-Seal was used in achieving hemostasis. Closure device deployed in the vessel. Hemostasis achieved successfully. Closure device additional comment: 5 MIN. MANUAL PRESSURE HELD

## 2023-08-25 NOTE — Clinical Note
MD STILL NOT ARRIVED. OFFER MADE TO PATIENT TO CONTINUE TO WAIT ON THE TABLE OR TO COME OFF.  SHE WANTS TO WAIT A LITTLE LONGER . WE TOLD HER TO LET US KNOW IF SHE GETS TOO UNCOMFORTABLE.

## 2023-08-25 NOTE — Clinical Note
Allergies reviewed.  H&P note has been confirmed for the patient. Procedural consent has been signed.  Blood consent hs been signed. Staff has reviewed the patient's labs.  The patient has denied she is pregnant.

## 2023-08-27 ENCOUNTER — APPOINTMENT (OUTPATIENT)
Dept: ULTRASOUND IMAGING | Facility: HOSPITAL | Age: 68
End: 2023-08-27
Payer: COMMERCIAL

## 2023-08-27 ENCOUNTER — APPOINTMENT (OUTPATIENT)
Dept: ULTRASOUND IMAGING | Facility: HOSPITAL | Age: 68
End: 2023-08-27
Payer: MEDICARE

## 2023-08-27 ENCOUNTER — HOSPITAL ENCOUNTER (EMERGENCY)
Facility: HOSPITAL | Age: 68
Discharge: HOME | End: 2023-08-27
Attending: EMERGENCY MEDICINE | Admitting: EMERGENCY MEDICINE
Payer: MEDICARE

## 2023-08-27 VITALS
TEMPERATURE: 98.4 F | WEIGHT: 163 LBS | HEIGHT: 63 IN | HEART RATE: 53 BPM | RESPIRATION RATE: 15 BRPM | DIASTOLIC BLOOD PRESSURE: 88 MMHG | OXYGEN SATURATION: 98 % | BODY MASS INDEX: 28.88 KG/M2 | SYSTOLIC BLOOD PRESSURE: 137 MMHG

## 2023-08-27 DIAGNOSIS — M79.604 ACUTE LEG PAIN, RIGHT: Primary | ICD-10-CM

## 2023-08-27 DIAGNOSIS — E11.69 MIXED DIABETIC HYPERLIPIDEMIA ASSOCIATED WITH TYPE 2 DIABETES MELLITUS: ICD-10-CM

## 2023-08-27 DIAGNOSIS — I43 TYPE 2 DIABETES MELLITUS WITH DIABETIC CARDIOMYOPATHY: ICD-10-CM

## 2023-08-27 DIAGNOSIS — E78.2 MIXED DIABETIC HYPERLIPIDEMIA ASSOCIATED WITH TYPE 2 DIABETES MELLITUS: ICD-10-CM

## 2023-08-27 DIAGNOSIS — M79.651 ACUTE PAIN OF RIGHT THIGH: ICD-10-CM

## 2023-08-27 DIAGNOSIS — R80.9 GRADE A2 ALBUMINURIA: ICD-10-CM

## 2023-08-27 DIAGNOSIS — E11.59 HYPERTENSION ASSOCIATED WITH DIABETES: ICD-10-CM

## 2023-08-27 DIAGNOSIS — I15.2 HYPERTENSION ASSOCIATED WITH DIABETES: ICD-10-CM

## 2023-08-27 DIAGNOSIS — E11.69 TYPE 2 DIABETES MELLITUS WITH DIABETIC CARDIOMYOPATHY: ICD-10-CM

## 2023-08-27 LAB
ALBUMIN SERPL-MCNC: 3.9 G/DL (ref 3.5–5.2)
ALBUMIN/GLOB SERPL: 1.4 G/DL
ALP SERPL-CCNC: 95 U/L (ref 39–117)
ALT SERPL W P-5'-P-CCNC: 13 U/L (ref 1–33)
ANION GAP SERPL CALCULATED.3IONS-SCNC: 13 MMOL/L (ref 5–15)
AST SERPL-CCNC: 10 U/L (ref 1–32)
BASOPHILS # BLD AUTO: 0.04 10*3/MM3 (ref 0–0.2)
BASOPHILS NFR BLD AUTO: 0.7 % (ref 0–1.5)
BILIRUB SERPL-MCNC: 0.4 MG/DL (ref 0–1.2)
BUN SERPL-MCNC: 13 MG/DL (ref 8–23)
BUN/CREAT SERPL: 20 (ref 7–25)
CALCIUM SPEC-SCNC: 9 MG/DL (ref 8.6–10.5)
CHLORIDE SERPL-SCNC: 103 MMOL/L (ref 98–107)
CHOLEST SERPL-MCNC: 203 MG/DL (ref 0–200)
CO2 SERPL-SCNC: 23 MMOL/L (ref 22–29)
CREAT SERPL-MCNC: 0.65 MG/DL (ref 0.57–1)
DEPRECATED RDW RBC AUTO: 43.1 FL (ref 37–54)
EGFRCR SERPLBLD CKD-EPI 2021: 96.6 ML/MIN/1.73
EOSINOPHIL # BLD AUTO: 0.16 10*3/MM3 (ref 0–0.4)
EOSINOPHIL NFR BLD AUTO: 2.9 % (ref 0.3–6.2)
ERYTHROCYTE [DISTWIDTH] IN BLOOD BY AUTOMATED COUNT: 13.2 % (ref 12.3–15.4)
GLOBULIN UR ELPH-MCNC: 2.8 GM/DL
GLUCOSE SERPL-MCNC: 233 MG/DL (ref 65–99)
HBA1C MFR BLD: 8 % (ref 4.8–5.6)
HCT VFR BLD AUTO: 37.5 % (ref 34–46.6)
HDLC SERPL-MCNC: 35 MG/DL (ref 40–60)
HGB BLD-MCNC: 12.4 G/DL (ref 12–15.9)
IMM GRANULOCYTES # BLD AUTO: 0.03 10*3/MM3 (ref 0–0.05)
IMM GRANULOCYTES NFR BLD AUTO: 0.5 % (ref 0–0.5)
LDLC SERPL CALC-MCNC: 75 MG/DL (ref 0–100)
LDLC/HDLC SERPL: 1.42 {RATIO}
LYMPHOCYTES # BLD AUTO: 1.82 10*3/MM3 (ref 0.7–3.1)
LYMPHOCYTES NFR BLD AUTO: 32.5 % (ref 19.6–45.3)
MCH RBC QN AUTO: 30 PG (ref 26.6–33)
MCHC RBC AUTO-ENTMCNC: 33.1 G/DL (ref 31.5–35.7)
MCV RBC AUTO: 90.6 FL (ref 79–97)
MONOCYTES # BLD AUTO: 0.46 10*3/MM3 (ref 0.1–0.9)
MONOCYTES NFR BLD AUTO: 8.2 % (ref 5–12)
NEUTROPHILS NFR BLD AUTO: 3.09 10*3/MM3 (ref 1.7–7)
NEUTROPHILS NFR BLD AUTO: 55.2 % (ref 42.7–76)
NRBC BLD AUTO-RTO: 0 /100 WBC (ref 0–0.2)
PLATELET # BLD AUTO: 252 10*3/MM3 (ref 140–450)
PMV BLD AUTO: 10.3 FL (ref 6–12)
POTASSIUM SERPL-SCNC: 4.3 MMOL/L (ref 3.5–5.2)
PROT SERPL-MCNC: 6.7 G/DL (ref 6–8.5)
RBC # BLD AUTO: 4.14 10*6/MM3 (ref 3.77–5.28)
SODIUM SERPL-SCNC: 139 MMOL/L (ref 136–145)
TRIGL SERPL-MCNC: 592 MG/DL (ref 0–150)
TSH SERPL DL<=0.05 MIU/L-ACNC: 1.75 UIU/ML (ref 0.27–4.2)
VIT B12 BLD-MCNC: 546 PG/ML (ref 211–946)
VLDLC SERPL-MCNC: 93 MG/DL (ref 5–40)
WBC NRBC COR # BLD: 5.6 10*3/MM3 (ref 3.4–10.8)
WHOLE BLOOD HOLD COAG: NORMAL

## 2023-08-27 PROCEDURE — 80061 LIPID PANEL: CPT | Performed by: INTERNAL MEDICINE

## 2023-08-27 PROCEDURE — 85025 COMPLETE CBC W/AUTO DIFF WBC: CPT | Performed by: INTERNAL MEDICINE

## 2023-08-27 PROCEDURE — 82607 VITAMIN B-12: CPT | Performed by: INTERNAL MEDICINE

## 2023-08-27 PROCEDURE — 83036 HEMOGLOBIN GLYCOSYLATED A1C: CPT | Performed by: INTERNAL MEDICINE

## 2023-08-27 PROCEDURE — 93926 LOWER EXTREMITY STUDY: CPT

## 2023-08-27 PROCEDURE — 36415 COLL VENOUS BLD VENIPUNCTURE: CPT | Performed by: EMERGENCY MEDICINE

## 2023-08-27 PROCEDURE — 36415 COLL VENOUS BLD VENIPUNCTURE: CPT

## 2023-08-27 PROCEDURE — 99284 EMERGENCY DEPT VISIT MOD MDM: CPT

## 2023-08-27 PROCEDURE — 93971 EXTREMITY STUDY: CPT

## 2023-08-27 PROCEDURE — 80053 COMPREHEN METABOLIC PANEL: CPT | Performed by: INTERNAL MEDICINE

## 2023-08-27 PROCEDURE — 84443 ASSAY THYROID STIM HORMONE: CPT | Performed by: INTERNAL MEDICINE

## 2023-08-27 NOTE — ED PROVIDER NOTES
Subjective   History of Present Illness  Patient presents emergency department with complaint of right upper thigh pain.  Patient states that she had a cardiac catheterization 2 days prior.  Patient states this area was initially not that sore but she is gotten up and about and try to be back at work the area has become increasingly more sore.  At this point no mass noted in the area minimal bruising at the site.  Patient notes no redness in the area in question.  Patient notes no fullness or mass, only discomfort in the area to palpation.  No skin changes.    History provided by:  Patient    Review of Systems   Constitutional: Negative.  Negative for appetite change, chills and fever.   HENT: Negative.  Negative for congestion.    Eyes: Negative.  Negative for photophobia and visual disturbance.   Respiratory: Negative.  Negative for cough, chest tightness and shortness of breath.    Cardiovascular: Negative.  Negative for chest pain and palpitations.   Gastrointestinal: Negative.  Negative for abdominal pain, constipation, diarrhea, nausea and vomiting.   Endocrine: Negative.    Genitourinary: Negative.  Negative for decreased urine volume, dysuria, flank pain and hematuria.   Musculoskeletal:  Positive for myalgias. Negative for arthralgias, back pain, neck pain and neck stiffness.   Skin: Negative.  Negative for pallor.   Neurological: Negative.  Negative for dizziness, syncope, weakness, light-headedness, numbness and headaches.   Psychiatric/Behavioral: Negative.  Negative for confusion and suicidal ideas. The patient is not nervous/anxious.    All other systems reviewed and are negative.    Past Medical History:   Diagnosis Date    Chicken pox     Coronary arteriosclerosis     Diabetes mellitus     TYPE 2    Diastasis of muscle     Fibrocystic breast     GERD (gastroesophageal reflux disease)     H/O Hashimoto thyroiditis     History of echocardiogram 01/01/2007    ef 35-40% diastolic dysfunction    History  of mammogram 2015    History of myocardial infarction 2006    History of Papanicolaou smear of cervix 2004    negative    Hyperlipidemia     Hypertension     Measles     Mild concentric left ventricular hypertrophy (LVH)     Thyroid nodule     Vitamin D deficiency        Allergies   Allergen Reactions    Chloromycetin [Chloramphenicol] Unknown (See Comments)     Unknown- young    Iodinated Contrast Media Hives     sneezing    Levaquin [Levofloxacin] Rash       Past Surgical History:   Procedure Laterality Date    CARDIAC CATHETERIZATION  2016    Successful PTCA and stenting of the mid LAD done with deployment of a drug eluting stent with reduciton of stenosis from 60% to less thna 0% stenosis.FFR evaluation.    CHOLECYSTECTOMY  10/01/1994    COLONOSCOPY  2012    Internal grade 1 hemorrhoids in anus. Moderate amount of adhesions that are present in sigmoid colon region. 1 sessile polyp in rectum; removed by cold biopsy polypectomy    COLONOSCOPY N/A 2018    Procedure: COLONOSCOPY;  Surgeon: Juan Pierre DO;  Location: [facility] ENDOSCOPY;  Service: Gastroenterology    DIAGNOSTIC LAPAROSCOPY  2001    Right upper quadrant abdominal pain she has had a total of 3    ENDOSCOPY N/A 2018    Procedure: ESOPHAGOGASTRODUODENOSCOPY;  Surgeon: Juan Pierre DO;  Location: [facility] ENDOSCOPY;  Service: Gastroenterology    ESOPHAGOSCOPY / EGD  08/10/1994    Esophageal nodule(biopsied) R/O Biliary Microlithiasis    HERNIA REPAIR  2007     (with parietex mesh.)    INJECTION OF MEDICATION      Kenalog (2)      TONSILLECTOMY AND ADENOIDECTOMY  1963    TUBAL ABDOMINAL LIGATION  2007    VAGINAL DELIVERY         Family History   Problem Relation Age of Onset    Diabetes Mother     Coronary artery disease Mother     Heart attack Father          at 53 multiple mi's    Coronary artery disease Other     Diabetes Other     Thyroid disease Other     Hypothyroidism Daughter         Social History     Socioeconomic History    Marital status: Single    Number of children: 1   Tobacco Use    Smoking status: Former     Types: Cigarettes    Smokeless tobacco: Never    Tobacco comments:     smoked for 5 years   Substance and Sexual Activity    Alcohol use: No    Drug use: No    Sexual activity: Not Currently     Partners: Male     Birth control/protection: Post-menopausal           Objective   Physical Exam  Vitals and nursing note reviewed.   Constitutional:       General: She is not in acute distress.     Appearance: Normal appearance. She is not ill-appearing or diaphoretic.   HENT:      Head: Normocephalic.      Mouth/Throat:      Mouth: Mucous membranes are moist.   Eyes:      Extraocular Movements: Extraocular movements intact.   Cardiovascular:      Rate and Rhythm: Normal rate.      Pulses: Normal pulses.      Heart sounds: Normal heart sounds. No murmur heard.    No friction rub. No gallop.   Pulmonary:      Effort: Pulmonary effort is normal. No respiratory distress.      Breath sounds: Normal breath sounds. No wheezing.   Abdominal:      General: Abdomen is flat.      Tenderness: There is no abdominal tenderness. There is no guarding.   Musculoskeletal:         General: Tenderness present. No swelling, deformity or signs of injury.      Cervical back: Normal range of motion. No rigidity or tenderness.      Right lower leg: No edema.      Left lower leg: No edema.      Comments: The site of the right groin without active hematoma.  There is tenderness in the mid thigh medially.  No masses felt.  No skin changes or ecchymosis are noted.   Lymphadenopathy:      Cervical: No cervical adenopathy.   Skin:     General: Skin is warm.      Capillary Refill: Capillary refill takes less than 2 seconds.   Neurological:      General: No focal deficit present.      Mental Status: She is alert and oriented to person, place, and time.   Psychiatric:         Mood and Affect: Mood normal.          Behavior: Behavior normal.         Thought Content: Thought content normal.         Judgment: Judgment normal.       Procedures           ED Course                                         Labs Reviewed   COMPREHENSIVE METABOLIC PANEL - Abnormal; Notable for the following components:       Result Value    Glucose 233 (*)     All other components within normal limits    Narrative:     GFR Normal >60  Chronic Kidney Disease <60  Kidney Failure <15     HEMOGLOBIN A1C - Abnormal; Notable for the following components:    Hemoglobin A1C 8.00 (*)     All other components within normal limits    Narrative:     Hemoglobin A1C Ranges:    Increased Risk for Diabetes  5.7% to 6.4%  Diabetes                     >= 6.5%  Diabetic Goal                < 7.0%   LIPID PANEL - Abnormal; Notable for the following components:    Total Cholesterol 203 (*)     Triglycerides 592 (*)     HDL Cholesterol 35 (*)     VLDL Cholesterol 93 (*)     All other components within normal limits    Narrative:     Cholesterol Reference Ranges  (U.S. Department of Health and Human Services ATP III Classifications)    Desirable          <200 mg/dL  Borderline High    200-239 mg/dL  High Risk          >240 mg/dL      Triglyceride Reference Ranges  (U.S. Department of Health and Human Services ATP III Classifications)    Normal           <150 mg/dL  Borderline High  150-199 mg/dL  High             200-499 mg/dL  Very High        >500 mg/dL    HDL Reference Ranges  (U.S. Department of Health and Human Services ATP III Classifications)    Low     <40 mg/dl (major risk factor for CHD)  High    >60 mg/dl ('negative' risk factor for CHD)        LDL Reference Ranges  (U.S. Department of Health and Human Services ATP III Classifications)    Optimal          <100 mg/dL  Near Optimal     100-129 mg/dL  Borderline High  130-159 mg/dL  High             160-189 mg/dL  Very High        >189 mg/dL   CBC WITH AUTO DIFFERENTIAL - Normal   TSH - Normal   VITAMIN B12   EXTRA  TUBES    Narrative:     The following orders were created for panel order Extra Tubes.  Procedure                               Abnormality         Status                     ---------                               -----------         ------                     Light Blue Top[[id number]]                                   In process                   Please view results for these tests on the individual orders.   LIGHT BLUE TOP       US Venous Doppler Lower Extremity Right (duplex)   Final Result   1. Normal duplex ultrasound of the right lower extremity without evidence of   DVT.         US Evaluation Pseudoaneurysm   Final Result   No pseudoaneurysm or AV fistula..                  Medical Decision Making  Patient with tenderness in the abductor region on the right inner thigh.  Negative scan for pseudoaneurysm or DVT.  Patient reassured.  Patient will be discharged outpatient follow-up.    Problems Addressed:  Acute leg pain, right: acute illness or injury  Acute pain of right thigh: acute illness or injury        Final diagnoses:   Acute leg pain, right   Acute pain of right thigh       ED Disposition  ED Disposition       ED Disposition   Discharge    Condition   Stable    Comment   --               Yanet Robert, APRN  [address]  [address] [place] [address]  [phone]    In 1 week  If not improved for further evaluation and care, For further evaluation and management         Medication List        Changed      meloxicam 7.5 MG tablet  Commonly known as: MOBIC  Take 1 tablet by mouth daily **Take with food**  What changed:   when to take this  reasons to take this     spironolactone 25 MG tablet  Commonly known as: ALDACTONE  Take 1 tablet by mouth Daily.  What changed:   when to take this  reasons to take this                 Dmitri Barnard MD  08/27/23 2060

## 2023-08-27 NOTE — Clinical Note
[facility] EMERGENCY DEPARTMENT  [address] [place] [id number]  Phone: [phone]    Bonnie Garcia was seen and treated in our emergency department on 8/27/2023.  She may return to work on 08/28/2023.         Thank you for choosing [facility].    Irma Patrick RN

## 2023-08-27 NOTE — Clinical Note
[facility] EMERGENCY DEPARTMENT  [address] [place] [id number]  Phone: [phone]    Bonnie Garcia was seen and treated in our emergency department on 8/27/2023.  She may return to work on 08/29/2023.         Thank you for choosing [facility].    Irma Patrick RN

## 2023-08-28 ENCOUNTER — DOCUMENTATION (OUTPATIENT)
Dept: CARDIAC REHAB | Facility: HOSPITAL | Age: 68
End: 2023-08-28
Payer: COMMERCIAL

## 2023-08-28 RX ORDER — ERGOCALCIFEROL 1.25 MG/1
CAPSULE ORAL
Qty: 8 CAPSULE | Refills: 6 | Status: SHIPPED | OUTPATIENT
Start: 2023-08-28

## 2023-09-06 ENCOUNTER — LAB (OUTPATIENT)
Dept: LAB | Facility: HOSPITAL | Age: 68
End: 2023-09-06
Payer: MEDICARE

## 2023-09-06 ENCOUNTER — TELEPHONE (OUTPATIENT)
Dept: ENDOCRINOLOGY | Facility: CLINIC | Age: 68
End: 2023-09-06
Payer: COMMERCIAL

## 2023-09-06 DIAGNOSIS — I43 TYPE 2 DIABETES MELLITUS WITH DIABETIC CARDIOMYOPATHY: ICD-10-CM

## 2023-09-06 DIAGNOSIS — R80.9 GRADE A2 ALBUMINURIA: ICD-10-CM

## 2023-09-06 DIAGNOSIS — E11.69 MIXED DIABETIC HYPERLIPIDEMIA ASSOCIATED WITH TYPE 2 DIABETES MELLITUS: ICD-10-CM

## 2023-09-06 DIAGNOSIS — E78.2 MIXED DIABETIC HYPERLIPIDEMIA ASSOCIATED WITH TYPE 2 DIABETES MELLITUS: ICD-10-CM

## 2023-09-06 DIAGNOSIS — E11.69 TYPE 2 DIABETES MELLITUS WITH DIABETIC CARDIOMYOPATHY: ICD-10-CM

## 2023-09-06 DIAGNOSIS — E11.59 HYPERTENSION ASSOCIATED WITH DIABETES: ICD-10-CM

## 2023-09-06 DIAGNOSIS — I15.2 HYPERTENSION ASSOCIATED WITH DIABETES: ICD-10-CM

## 2023-09-06 PROCEDURE — 82043 UR ALBUMIN QUANTITATIVE: CPT

## 2023-09-06 PROCEDURE — 82570 ASSAY OF URINE CREATININE: CPT

## 2023-09-06 NOTE — TELEPHONE ENCOUNTER
Ms. Bonnie Shelton called and would like to speak with Nena concerning her insulin bottles for her pump.    Pt call back [phone]

## 2023-09-07 LAB
ALBUMIN UR-MCNC: 2.4 MG/DL
CREAT UR-MCNC: 42.3 MG/DL
MICROALBUMIN/CREAT UR: 56.7 MG/G

## 2023-09-07 RX ORDER — INSULIN ASPART 100 [IU]/ML
INJECTION, SOLUTION INTRAVENOUS; SUBCUTANEOUS
Qty: 10 ML | Refills: 3 | Status: SHIPPED | OUTPATIENT
Start: 2023-09-07

## 2023-09-08 ENCOUNTER — OFFICE VISIT (OUTPATIENT)
Dept: ENDOCRINOLOGY | Facility: CLINIC | Age: 68
End: 2023-09-08
Payer: MEDICARE

## 2023-09-08 VITALS
WEIGHT: 166 LBS | HEIGHT: 63 IN | DIASTOLIC BLOOD PRESSURE: 60 MMHG | OXYGEN SATURATION: 97 % | HEART RATE: 76 BPM | SYSTOLIC BLOOD PRESSURE: 140 MMHG | BODY MASS INDEX: 29.41 KG/M2

## 2023-09-08 DIAGNOSIS — E11.59 HYPERTENSION ASSOCIATED WITH DIABETES: ICD-10-CM

## 2023-09-08 DIAGNOSIS — E53.8 B12 DEFICIENCY: ICD-10-CM

## 2023-09-08 DIAGNOSIS — R80.9 GRADE A2 ALBUMINURIA: ICD-10-CM

## 2023-09-08 DIAGNOSIS — E11.69 TYPE 2 DIABETES MELLITUS WITH DIABETIC CARDIOMYOPATHY: Primary | ICD-10-CM

## 2023-09-08 DIAGNOSIS — E78.2 MIXED DIABETIC HYPERLIPIDEMIA ASSOCIATED WITH TYPE 2 DIABETES MELLITUS: ICD-10-CM

## 2023-09-08 DIAGNOSIS — I43 TYPE 2 DIABETES MELLITUS WITH DIABETIC CARDIOMYOPATHY: Primary | ICD-10-CM

## 2023-09-08 DIAGNOSIS — E11.69 MIXED DIABETIC HYPERLIPIDEMIA ASSOCIATED WITH TYPE 2 DIABETES MELLITUS: ICD-10-CM

## 2023-09-08 DIAGNOSIS — E55.9 VITAMIN D DEFICIENCY: ICD-10-CM

## 2023-09-08 DIAGNOSIS — I15.2 HYPERTENSION ASSOCIATED WITH DIABETES: ICD-10-CM

## 2023-09-08 NOTE — PROGRESS NOTES
" Bonnie Garcia is a 68 y.o. female who presents for  evaluation of   Chief Complaint   Patient presents with    Diabetes     T2       HPI      Duration/Timing:    Age at onset of diabetes: 58 years      Severity (Complications/Hospitalizations)  Secondary Macrovascular Complications:  CAD, No CVA, No PAD  has 50 to 50% LAD     PTCI , stent x 5    2 in proximal LAD    1 in circumflex    may need heart surgery     Secondary Microvascular Complications:  No Diabetic Nephropathy, No Diabetic Retinopathy ( cataracts ), No Diabetic Neuropathy    Context  Diabetes Regimen:  Oral Medications     Associated Signs/Symptoms  None at this time     Developed DKA in the setting of UTI, dehydration and ongoing sglt2 I     =============      Also h o thyroid nodule  location - right  timing constant  not progressive  severity - low -  bx , Sept 2008 - benign   biochemically euthyroid , off synthroid but used to be on 25 mcgs daily     thyroid US personally reviewed    Sept 2008    1.8 x 1.7 x 1.4 , right , solid , heterogeneous, hypoechoic.     Repeat US June 2013    r solid nodule stable in size, echogenicity consistent w Hashimoto's     PE    /60   Pulse 76   Ht 160 cm (63\")   Wt 75.3 kg (166 lb)   LMP  (LMP Unknown)   SpO2 97%   BMI 29.41 kg/m²   AOx3  No goiter  Normal Respiratory Effort , Lung CTA  RRR  No Edema    Labs    Lab Results   Component Value Date    WBC 5.60 08/27/2023    HGB 12.4 08/27/2023    HCT 37.5 08/27/2023    MCV 90.6 08/27/2023     08/27/2023     Lab Results   Component Value Date    GLUCOSE 233 (H) 08/27/2023    BUN 13 08/27/2023    CREATININE 0.65 08/27/2023    EGFR 96.6 08/27/2023    BCR 20.0 08/27/2023    K 4.3 08/27/2023    CO2 23.0 08/27/2023    CALCIUM 9.0 08/27/2023    ALBUMIN 3.9 08/27/2023    BILITOT 0.4 08/27/2023    AST 10 08/27/2023    ALT 13 08/27/2023     Component      Latest Ref Rng 3/30/2022   Glucose      65 - 99 mg/dL 151 (H)    BUN      8 - 23 mg/dL 11  "   Creatinine      0.57 - 1.00 mg/dL 0.58    Sodium      136 - 145 mmol/L 136    Potassium      3.5 - 5.2 mmol/L 3.9    Chloride      98 - 107 mmol/L 109 (H)    CO2      22.0 - 29.0 mmol/L 18.0 (L)    Calcium      8.6 - 10.5 mg/dL 8.0 (L)    BUN/Creatinine Ratio      7.0 - 25.0  19.0    Anion Gap      5.0 - 15.0 mmol/L 9.0    eGFR      >60.0 mL/min/1.73 99.9    C-Peptide, Fasting      1.1 - 4.4 ng/ml 0.9 ! (E)      (H) High  (L) Low  ! Abnormal  (E) External lab result      Assessment & Plan       ICD-10-CM ICD-9-CM   1. Type 2 diabetes mellitus with diabetic cardiomyopathy  E11.69 250.80    I43 425.7   2. Mixed diabetic hyperlipidemia associated with type 2 diabetes mellitus  E11.69 250.80    E78.2 272.2   3. Hypertension associated with diabetes  E11.59 250.80    I15.2 401.9   4. Grade A2 albuminuria  R80.9 791.0   5. B12 deficiency  E53.8 266.2   6. Vitamin D deficiency  E55.9 268.9         Glycemic Management:      Lab Results   Component Value Date    HGBA1C 8.00 (H) 08/27/2023    HGBA1C 7.80 (H) 05/30/2023    HGBA1C 7.80 (H) 03/10/2023     Lab Results   Component Value Date    MICROALBUR 2.4 09/06/2023    CREATININE 0.65 08/27/2023       Januvia 100 mg daily  Stop   Vomited on 3 mg of rybelsus     --     synjardy     --  --    Tresiba 20 units -22 -26 - increase to 30  Self adjust for target of fasting 80 to 130 , increase by 2 units every 4 days but don't exceed 36 units     Fiasp   Sliding scale of 2 per 50     2 UNITS PER 15 GRAMS - increase to 3 units per 15 grams     dexcom , 2 week review, scanned in w data finalizing 09/08/23       Time in Target 5% lat time , now 18% - 14%  No lows      Conclusion , type 2 diabetes with hyperglycemia     GMI,more than 9%  Report sent for scanning      Approved for 780 g Namshi     I suggest , basal of 1 unit per hour     Carb ratio of 5     Correction 25       Lipid Management  Lab Results   Component Value Date    CHOL 203 (H) 08/27/2023    CHOL 230 (H) 05/30/2023  "   CHOL 112 03/10/2023     Lab Results   Component Value Date    TRIG 592 (H) 08/27/2023    TRIG 693 (H) 05/30/2023    TRIG 159 (H) 03/10/2023     Lab Results   Component Value Date    HDL 35 (L) 08/27/2023    HDL 27 (L) 05/30/2023    HDL 41 03/10/2023       Lab Results   Component Value Date    LDL 75 08/27/2023    LDL 89 05/30/2023    LDL 44 03/10/2023     The 10-year ASCVD risk score (Rukhsana DARLING, et al., 2019) is: 23.1%    Values used to calculate the score:      Age: 68 years      Sex: Female      Is Non- : No      Diabetic: Yes      Tobacco smoker: No      Systolic Blood Pressure: 140 mmHg      Is BP treated: Yes      HDL Cholesterol: 35 mg/dL      Total Cholesterol: 203 mg/dL       On fenofibrate and repatha   He has body aches, I will stop fenofibrate once aic is at goal   And vascepa   Using omnipod will help     Blood Pressure Management:    /60   Pulse 76   Ht 160 cm (63\")   Wt 75.3 kg (166 lb)   LMP  (LMP Unknown)   SpO2 97%   BMI 29.41 kg/m²     Per cardiology     edema left > r , use compression stockings    No longer a problem     +  neuropathy   Meralgia paresthetica  Start neurontin 300 mg tid had to back off to 100 mg po bid - can't tolerate , doing better    Lab Results   Component Value Date    MALBCRERATIO 56.7 09/06/2023    MALBCRERATIO  05/30/2023      Comment:      Unable to calculate     Moderate albuminuria   She is on aldactone , synjardy and benicar         Weight Changes     Add contrave to lifestyle changes - felt she was going to die   intolerant to rybelsus 3 mg     Bone Health  Vit D 50 th weekly and levels are nl   Other Diabetes Related Aspects  Right Thyroid nodule      R nodule , dimensions in HPI , stable from 2008 to 2013     she has Hashimoto's proven on US and + TPO ab, euthyroid June 2013  used to be on Lt4 at 25 mcgs but d/c years ago  follow TSH - nl     Lab Results   Component Value Date    TSH 1.750 08/27/2023         --    10-15  nl B12 " but low normal, start b12     having some unsteadiness and numbness    Lab Results   Component Value Date    [id number] 546 08/27/2023     Please take in addition to MVI    No orders of the defined types were placed in this encounter.      No orders of the defined types were placed in this encounter.            This document has been electronically signed by Victorino Leyva MD on September 8, 2023 10:38 CDT

## 2023-09-18 RX ORDER — ICOSAPENT ETHYL 1000 MG/1
CAPSULE ORAL
Qty: 120 CAPSULE | Refills: 1 | Status: SHIPPED | OUTPATIENT
Start: 2023-09-18

## (undated) DEVICE — PK CATH LAB 60

## (undated) DEVICE — GW PERIPH GUIDERIGHT STD/J/TP PTFE/PCOAT SS 0.038IN 5X150CM

## (undated) DEVICE — CATH DIAG EXPO M/ PK 6FR FL4/FR4 PIG 3PK

## (undated) DEVICE — MODEL BT2000 P/N 700287-012KIT CONTENTS: MANIFOLD WITH SALINE AND CONTRAST PORTS, SALINE TUBING WITH SPIKE AND HAND SYRINGE, TRANSDUCER: Brand: BT2000 AUTOMATED MANIFOLD KIT

## (undated) DEVICE — CANN SMPL SOFTECH BIFLO ETCO2 A/M 7FT

## (undated) DEVICE — KT INTRO MINISTICK MAX W/GW NITNL/TUNG ECHO 4F 21G 7CM

## (undated) DEVICE — ELECTRODE,RT,STRESS,FOAM,50PK: Brand: MEDLINE

## (undated) DEVICE — PAD HEMOST NEPTUNE 2X2IN

## (undated) DEVICE — MODEL AT P65, P/N 701554-001KIT CONTENTS: HAND CONTROLLER, 3-WAY HIGH-PRESSURE STOPCOCK WITH ROTATING END AND PREMIUM HIGH-PRESSURE TUBING: Brand: ANGIOTOUCH® KIT

## (undated) DEVICE — SINGLE-USE BIOPSY FORCEPS: Brand: RADIAL JAW 4

## (undated) DEVICE — A2000 MULTI-USE SYRINGE KIT, P/N 701277-003KIT CONTENTS: 100ML CONTRAST RESERVOIR AND TUBING WITH CONTRAST SPIKE AND CLAMP: Brand: A2000 MULTI-USE SYRINGE KIT

## (undated) DEVICE — ANGIO-SEAL VIP VASCULAR CLOSURE DEVICE: Brand: ANGIO-SEAL

## (undated) DEVICE — INTRO SHEATH ART/FEM ENGAGE .038 6F12CM